# Patient Record
Sex: FEMALE | Race: WHITE | NOT HISPANIC OR LATINO | Employment: UNEMPLOYED | URBAN - NONMETROPOLITAN AREA
[De-identification: names, ages, dates, MRNs, and addresses within clinical notes are randomized per-mention and may not be internally consistent; named-entity substitution may affect disease eponyms.]

---

## 2017-01-04 ENCOUNTER — TRANSCRIBE ORDERS (OUTPATIENT)
Dept: ADMINISTRATIVE | Facility: HOSPITAL | Age: 32
End: 2017-01-04

## 2017-01-04 DIAGNOSIS — Z12.31 ENCOUNTER FOR MAMMOGRAM TO ESTABLISH BASELINE MAMMOGRAM: Primary | ICD-10-CM

## 2017-02-13 ENCOUNTER — HOSPITAL ENCOUNTER (OUTPATIENT)
Dept: MAMMOGRAPHY | Facility: HOSPITAL | Age: 32
Discharge: HOME | End: 2017-02-13
Attending: OBSTETRICS & GYNECOLOGY | Admitting: OBSTETRICS & GYNECOLOGY

## 2017-02-13 DIAGNOSIS — Z12.31 ENCOUNTER FOR MAMMOGRAM TO ESTABLISH BASELINE MAMMOGRAM: ICD-10-CM

## 2017-02-13 PROCEDURE — 77063 BREAST TOMOSYNTHESIS BI: CPT

## 2017-02-13 PROCEDURE — G0202 SCR MAMMO BI INCL CAD: HCPCS

## 2017-02-22 ENCOUNTER — ANESTHESIA (OUTPATIENT)
Dept: PERIOP | Facility: HOSPITAL | Age: 32
End: 2017-02-22

## 2017-02-22 ENCOUNTER — OFFICE VISIT (OUTPATIENT)
Dept: UROLOGY | Facility: CLINIC | Age: 32
End: 2017-02-22

## 2017-02-22 ENCOUNTER — HOSPITAL ENCOUNTER (OUTPATIENT)
Dept: GENERAL RADIOLOGY | Facility: HOSPITAL | Age: 32
Discharge: HOME | End: 2017-02-22

## 2017-02-22 ENCOUNTER — ANESTHESIA EVENT (OUTPATIENT)
Dept: PERIOP | Facility: HOSPITAL | Age: 32
End: 2017-02-22

## 2017-02-22 ENCOUNTER — HOSPITAL ENCOUNTER (OUTPATIENT)
Facility: HOSPITAL | Age: 32
Discharge: HOME | End: 2017-02-22
Attending: UROLOGY | Admitting: UROLOGY

## 2017-02-22 ENCOUNTER — TELEPHONE (OUTPATIENT)
Dept: UROLOGY | Facility: CLINIC | Age: 32
End: 2017-02-22

## 2017-02-22 VITALS — BODY MASS INDEX: 32.78 KG/M2 | HEIGHT: 64 IN | WEIGHT: 192 LBS

## 2017-02-22 VITALS
OXYGEN SATURATION: 96 % | TEMPERATURE: 97.9 F | BODY MASS INDEX: 33.29 KG/M2 | SYSTOLIC BLOOD PRESSURE: 110 MMHG | WEIGHT: 195 LBS | RESPIRATION RATE: 16 BRPM | DIASTOLIC BLOOD PRESSURE: 72 MMHG | HEIGHT: 64 IN | HEART RATE: 101 BPM

## 2017-02-22 DIAGNOSIS — R31.0 GROSS HEMATURIA: ICD-10-CM

## 2017-02-22 DIAGNOSIS — R10.9 FLANK PAIN: ICD-10-CM

## 2017-02-22 DIAGNOSIS — N13.2 HYDRONEPHROSIS WITH RENAL AND URETERAL CALCULUS OBSTRUCTION: ICD-10-CM

## 2017-02-22 DIAGNOSIS — N20.1 URETERAL STONE: Primary | ICD-10-CM

## 2017-02-22 DIAGNOSIS — N20.1 URETERAL STONE: ICD-10-CM

## 2017-02-22 LAB
ANION GAP SERPL CALCULATED.3IONS-SCNC: 7 MMOL/L (ref 4–13)
APTT PPP: 26.9 SECONDS (ref 24.1–34.8)
B-HCG UR QL: NEGATIVE
BACTERIA UR QL AUTO: ABNORMAL /HPF
BILIRUB UR QL STRIP: NEGATIVE
BUN BLD-MCNC: 19 MG/DL (ref 5–21)
BUN/CREAT SERPL: 19.2 (ref 7–25)
CALCIUM SPEC-SCNC: 8.7 MG/DL (ref 8.4–10.4)
CHLORIDE SERPL-SCNC: 102 MMOL/L (ref 98–110)
CLARITY UR: CLEAR
CO2 SERPL-SCNC: 29 MMOL/L (ref 24–31)
COLOR UR: YELLOW
CREAT BLD-MCNC: 0.99 MG/DL (ref 0.5–1.4)
DEPRECATED RDW RBC AUTO: 40.3 FL (ref 40–54)
ERYTHROCYTE [DISTWIDTH] IN BLOOD BY AUTOMATED COUNT: 13.5 % (ref 12–15)
GFR SERPL CREATININE-BSD FRML MDRD: 65 ML/MIN/1.73
GLUCOSE BLD-MCNC: 94 MG/DL (ref 70–100)
GLUCOSE UR STRIP-MCNC: NEGATIVE MG/DL
HCT VFR BLD AUTO: 40.2 % (ref 37–47)
HGB BLD-MCNC: 13.3 G/DL (ref 12–16)
HGB UR QL STRIP.AUTO: ABNORMAL
HYALINE CASTS UR QL AUTO: ABNORMAL /LPF
INR PPP: 0.83 (ref 0.91–1.09)
KETONES UR QL STRIP: NEGATIVE
LEUKOCYTE ESTERASE UR QL STRIP.AUTO: NEGATIVE
MCH RBC QN AUTO: 26.9 PG (ref 28–32)
MCHC RBC AUTO-ENTMCNC: 33.1 G/DL (ref 33–36)
MCV RBC AUTO: 81.2 FL (ref 82–98)
NITRITE UR QL STRIP: NEGATIVE
PH UR STRIP.AUTO: 7.5 [PH] (ref 5–8)
PLATELET # BLD AUTO: 218 10*3/MM3 (ref 130–400)
PMV BLD AUTO: 9.4 FL (ref 6–12)
POTASSIUM BLD-SCNC: 4.3 MMOL/L (ref 3.5–5.3)
PROT UR QL STRIP: NEGATIVE
PROTHROMBIN TIME: 11.6 SECONDS (ref 11.9–14.6)
RBC # BLD AUTO: 4.95 10*6/MM3 (ref 4.2–5.4)
RBC # UR: ABNORMAL /HPF
REF LAB TEST METHOD: ABNORMAL
SODIUM BLD-SCNC: 138 MMOL/L (ref 135–145)
SP GR UR STRIP: 1.01 (ref 1–1.03)
SQUAMOUS #/AREA URNS HPF: ABNORMAL /HPF
UROBILINOGEN UR QL STRIP: ABNORMAL
WBC NRBC COR # BLD: 8.6 10*3/MM3 (ref 4.8–10.8)
WBC UR QL AUTO: ABNORMAL /HPF

## 2017-02-22 PROCEDURE — 25010000002 MIDAZOLAM PER 1 MG: Performed by: ANESTHESIOLOGY

## 2017-02-22 PROCEDURE — 25010000002 FENTANYL CITRATE (PF) 100 MCG/2ML SOLUTION: Performed by: NURSE ANESTHETIST, CERTIFIED REGISTERED

## 2017-02-22 PROCEDURE — 80048 BASIC METABOLIC PNL TOTAL CA: CPT | Performed by: UROLOGY

## 2017-02-22 PROCEDURE — 99204 OFFICE O/P NEW MOD 45 MIN: CPT | Performed by: UROLOGY

## 2017-02-22 PROCEDURE — 74000 HC ABDOMEN KUB: CPT

## 2017-02-22 PROCEDURE — 85730 THROMBOPLASTIN TIME PARTIAL: CPT | Performed by: UROLOGY

## 2017-02-22 PROCEDURE — 81001 URINALYSIS AUTO W/SCOPE: CPT | Performed by: UROLOGY

## 2017-02-22 PROCEDURE — 25010000002 SUCCINYLCHOLINE PER 20 MG: Performed by: NURSE ANESTHETIST, CERTIFIED REGISTERED

## 2017-02-22 PROCEDURE — 81025 URINE PREGNANCY TEST: CPT | Performed by: UROLOGY

## 2017-02-22 PROCEDURE — 25010000002 PROPOFOL 10 MG/ML EMULSION: Performed by: NURSE ANESTHETIST, CERTIFIED REGISTERED

## 2017-02-22 PROCEDURE — 25010000003 CEFAZOLIN PER 500 MG: Performed by: UROLOGY

## 2017-02-22 PROCEDURE — 85027 COMPLETE CBC AUTOMATED: CPT | Performed by: UROLOGY

## 2017-02-22 PROCEDURE — 85610 PROTHROMBIN TIME: CPT | Performed by: UROLOGY

## 2017-02-22 PROCEDURE — 50590 FRAGMENTING OF KIDNEY STONE: CPT | Performed by: UROLOGY

## 2017-02-22 RX ORDER — ROCURONIUM BROMIDE 10 MG/ML
INJECTION, SOLUTION INTRAVENOUS AS NEEDED
Status: DISCONTINUED | OUTPATIENT
Start: 2017-02-22 | End: 2017-02-22 | Stop reason: SURG

## 2017-02-22 RX ORDER — HYDRALAZINE HYDROCHLORIDE 20 MG/ML
5 INJECTION INTRAMUSCULAR; INTRAVENOUS
Status: DISCONTINUED | OUTPATIENT
Start: 2017-02-22 | End: 2017-02-22 | Stop reason: HOSPADM

## 2017-02-22 RX ORDER — KETOROLAC TROMETHAMINE 10 MG/1
10 TABLET, FILM COATED ORAL EVERY 6 HOURS PRN
COMMUNITY

## 2017-02-22 RX ORDER — LABETALOL HYDROCHLORIDE 5 MG/ML
5 INJECTION, SOLUTION INTRAVENOUS
Status: DISCONTINUED | OUTPATIENT
Start: 2017-02-22 | End: 2017-02-22 | Stop reason: HOSPADM

## 2017-02-22 RX ORDER — FENTANYL CITRATE 50 UG/ML
INJECTION, SOLUTION INTRAMUSCULAR; INTRAVENOUS AS NEEDED
Status: DISCONTINUED | OUTPATIENT
Start: 2017-02-22 | End: 2017-02-22 | Stop reason: SURG

## 2017-02-22 RX ORDER — SODIUM CHLORIDE, SODIUM LACTATE, POTASSIUM CHLORIDE, CALCIUM CHLORIDE 600; 310; 30; 20 MG/100ML; MG/100ML; MG/100ML; MG/100ML
9 INJECTION, SOLUTION INTRAVENOUS CONTINUOUS
Status: DISCONTINUED | OUTPATIENT
Start: 2017-02-22 | End: 2017-02-22 | Stop reason: HOSPADM

## 2017-02-22 RX ORDER — LIDOCAINE HYDROCHLORIDE 20 MG/ML
INJECTION, SOLUTION INFILTRATION; PERINEURAL AS NEEDED
Status: DISCONTINUED | OUTPATIENT
Start: 2017-02-22 | End: 2017-02-22 | Stop reason: SURG

## 2017-02-22 RX ORDER — SUCCINYLCHOLINE CHLORIDE 20 MG/ML
INJECTION INTRAMUSCULAR; INTRAVENOUS AS NEEDED
Status: DISCONTINUED | OUTPATIENT
Start: 2017-02-22 | End: 2017-02-22 | Stop reason: SURG

## 2017-02-22 RX ORDER — SPIRONOLACTONE 50 MG/1
TABLET, FILM COATED ORAL
COMMUNITY
Start: 2016-11-21

## 2017-02-22 RX ORDER — ATOMOXETINE HYDROCHLORIDE 80 MG/1
60 CAPSULE ORAL DAILY
COMMUNITY
Start: 2016-11-25

## 2017-02-22 RX ORDER — NALOXONE HCL 0.4 MG/ML
0.4 VIAL (ML) INJECTION AS NEEDED
Status: DISCONTINUED | OUTPATIENT
Start: 2017-02-22 | End: 2017-02-22 | Stop reason: HOSPADM

## 2017-02-22 RX ORDER — MIDAZOLAM HYDROCHLORIDE 1 MG/ML
1 INJECTION INTRAMUSCULAR; INTRAVENOUS
Status: DISCONTINUED | OUTPATIENT
Start: 2017-02-22 | End: 2017-02-22 | Stop reason: HOSPADM

## 2017-02-22 RX ORDER — ESCITALOPRAM OXALATE 20 MG/1
30 TABLET ORAL DAILY
COMMUNITY
Start: 2016-11-25

## 2017-02-22 RX ORDER — ONDANSETRON 4 MG/1
4 TABLET, FILM COATED ORAL ONCE AS NEEDED
Status: DISCONTINUED | OUTPATIENT
Start: 2017-02-22 | End: 2017-02-22 | Stop reason: HOSPADM

## 2017-02-22 RX ORDER — IPRATROPIUM BROMIDE AND ALBUTEROL SULFATE 2.5; .5 MG/3ML; MG/3ML
3 SOLUTION RESPIRATORY (INHALATION) ONCE AS NEEDED
Status: DISCONTINUED | OUTPATIENT
Start: 2017-02-22 | End: 2017-02-22 | Stop reason: HOSPADM

## 2017-02-22 RX ORDER — PROPOFOL 10 MG/ML
VIAL (ML) INTRAVENOUS AS NEEDED
Status: DISCONTINUED | OUTPATIENT
Start: 2017-02-22 | End: 2017-02-22 | Stop reason: SURG

## 2017-02-22 RX ORDER — ONDANSETRON 2 MG/ML
4 INJECTION INTRAMUSCULAR; INTRAVENOUS ONCE AS NEEDED
Status: DISCONTINUED | OUTPATIENT
Start: 2017-02-22 | End: 2017-02-22 | Stop reason: HOSPADM

## 2017-02-22 RX ORDER — ACETAMINOPHEN 10 MG/ML
1000 INJECTION, SOLUTION INTRAVENOUS ONCE
Status: COMPLETED | OUTPATIENT
Start: 2017-02-22 | End: 2017-02-22

## 2017-02-22 RX ORDER — DEXTROSE MONOHYDRATE 25 G/50ML
12.5 INJECTION, SOLUTION INTRAVENOUS AS NEEDED
Status: DISCONTINUED | OUTPATIENT
Start: 2017-02-22 | End: 2017-02-22 | Stop reason: HOSPADM

## 2017-02-22 RX ORDER — FERROUS SULFATE TAB EC 324 MG (65 MG FE EQUIVALENT) 324 (65 FE) MG
324 TABLET DELAYED RESPONSE ORAL
COMMUNITY

## 2017-02-22 RX ORDER — MIDAZOLAM HYDROCHLORIDE 1 MG/ML
2 INJECTION INTRAMUSCULAR; INTRAVENOUS
Status: DISCONTINUED | OUTPATIENT
Start: 2017-02-22 | End: 2017-02-22 | Stop reason: HOSPADM

## 2017-02-22 RX ORDER — MEPERIDINE HYDROCHLORIDE 25 MG/ML
12.5 INJECTION INTRAMUSCULAR; INTRAVENOUS; SUBCUTANEOUS
Status: DISCONTINUED | OUTPATIENT
Start: 2017-02-22 | End: 2017-02-22 | Stop reason: HOSPADM

## 2017-02-22 RX ORDER — SODIUM CHLORIDE 0.9 % (FLUSH) 0.9 %
1-10 SYRINGE (ML) INJECTION AS NEEDED
Status: DISCONTINUED | OUTPATIENT
Start: 2017-02-22 | End: 2017-02-22 | Stop reason: HOSPADM

## 2017-02-22 RX ORDER — PROMETHAZINE HYDROCHLORIDE 25 MG/ML
12.5 INJECTION, SOLUTION INTRAMUSCULAR; INTRAVENOUS ONCE AS NEEDED
Status: DISCONTINUED | OUTPATIENT
Start: 2017-02-22 | End: 2017-02-22 | Stop reason: HOSPADM

## 2017-02-22 RX ORDER — LORAZEPAM 1 MG/1
1 TABLET ORAL EVERY 8 HOURS PRN
COMMUNITY

## 2017-02-22 RX ORDER — LAMOTRIGINE 100 MG/1
TABLET ORAL
COMMUNITY
Start: 2016-12-13

## 2017-02-22 RX ORDER — TAMSULOSIN HYDROCHLORIDE 0.4 MG/1
1 CAPSULE ORAL DAILY
Qty: 30 CAPSULE | Refills: 3 | Status: SHIPPED | OUTPATIENT
Start: 2017-02-22

## 2017-02-22 RX ORDER — DIPHENHYDRAMINE HYDROCHLORIDE 50 MG/ML
12.5 INJECTION INTRAMUSCULAR; INTRAVENOUS
Status: DISCONTINUED | OUTPATIENT
Start: 2017-02-22 | End: 2017-02-22 | Stop reason: HOSPADM

## 2017-02-22 RX ADMIN — MIDAZOLAM HYDROCHLORIDE 2 MG: 1 INJECTION, SOLUTION INTRAMUSCULAR; INTRAVENOUS at 13:44

## 2017-02-22 RX ADMIN — ACETAMINOPHEN 1000 MG: 10 INJECTION, SOLUTION INTRAVENOUS at 13:44

## 2017-02-22 RX ADMIN — CEFAZOLIN SODIUM 2 G: 2 SOLUTION INTRAVENOUS at 14:13

## 2017-02-22 RX ADMIN — SODIUM CHLORIDE, POTASSIUM CHLORIDE, SODIUM LACTATE AND CALCIUM CHLORIDE 9 ML/HR: 600; 310; 30; 20 INJECTION, SOLUTION INTRAVENOUS at 13:44

## 2017-02-22 RX ADMIN — MIDAZOLAM HYDROCHLORIDE 2 MG: 1 INJECTION, SOLUTION INTRAMUSCULAR; INTRAVENOUS at 13:48

## 2017-02-22 RX ADMIN — FENTANYL CITRATE 50 MCG: 50 INJECTION INTRAMUSCULAR; INTRAVENOUS at 14:20

## 2017-02-22 RX ADMIN — SUCCINYLCHOLINE CHLORIDE 120 MG: 20 INJECTION, SOLUTION INTRAMUSCULAR; INTRAVENOUS at 14:16

## 2017-02-22 RX ADMIN — ROCURONIUM BROMIDE 5 MG: 10 INJECTION INTRAVENOUS at 14:16

## 2017-02-22 RX ADMIN — SODIUM CHLORIDE, POTASSIUM CHLORIDE, SODIUM LACTATE AND CALCIUM CHLORIDE: 600; 310; 30; 20 INJECTION, SOLUTION INTRAVENOUS at 15:20

## 2017-02-22 RX ADMIN — LIDOCAINE HYDROCHLORIDE 0.5 ML: 10 INJECTION, SOLUTION EPIDURAL; INFILTRATION; INTRACAUDAL; PERINEURAL at 13:44

## 2017-02-22 RX ADMIN — PROPOFOL 200 MG: 10 INJECTION, EMULSION INTRAVENOUS at 14:16

## 2017-02-22 RX ADMIN — FENTANYL CITRATE 50 MCG: 50 INJECTION INTRAMUSCULAR; INTRAVENOUS at 14:24

## 2017-02-22 RX ADMIN — FENTANYL CITRATE 100 MCG: 50 INJECTION INTRAMUSCULAR; INTRAVENOUS at 14:16

## 2017-02-22 RX ADMIN — FENTANYL CITRATE 50 MCG: 50 INJECTION INTRAMUSCULAR; INTRAVENOUS at 14:30

## 2017-02-22 RX ADMIN — LIDOCAINE HYDROCHLORIDE 40 MG: 20 INJECTION, SOLUTION INFILTRATION; PERINEURAL at 14:16

## 2017-02-22 NOTE — ANESTHESIA POSTPROCEDURE EVALUATION
Patient: Iliana Young    Procedure Summary     Date Anesthesia Start Anesthesia Stop Room / Location    02/22/17 1413 1526 [facility] 06 / [facility]       Procedure Diagnosis Surgeon Provider    EXTRACORPOREAL SHOCKWAVE LITHOTRIPSY (Left Flank) Ureteral stone  (Ureteral stone [N20.1]) MD Darion Sanchez CRNA          Anesthesia Type: general  Last vitals  /62 (02/22/17 1605)    Temp      Pulse 82 (02/22/17 1605)   Resp 16 (02/22/17 1605)    SpO2 96 % (02/22/17 1605)      Post Anesthesia Care and Evaluation    Patient location during evaluation: PACU  Patient participation: complete - patient cannot participate  Level of consciousness: awake  Pain score: 0  Pain management: adequate  Airway patency: patent  Anesthetic complications: No anesthetic complications  PONV Status: none  Cardiovascular status: acceptable  Respiratory status: acceptable  Hydration status: acceptable

## 2017-02-22 NOTE — ANESTHESIA PREPROCEDURE EVALUATION
Anesthesia Evaluation     Patient summary reviewed   no history of anesthetic complications:  NPO Status: > 8 hours   Airway   Mallampati: II  TM distance: >3 FB  Neck ROM: full  Dental      Pulmonary    (-) asthma, sleep apnea, not a smoker  Cardiovascular - negative cardio ROS        Neuro/Psych  (-) seizures, CVA  GI/Hepatic/Renal/Endo    (+) obesity,    (-) liver disease, renal disease, diabetes    Musculoskeletal     Abdominal    Substance History      OB/GYN          Other                                    Anesthesia Plan    ASA 2     general     intravenous induction   Anesthetic plan and risks discussed with patient.

## 2017-02-22 NOTE — PROGRESS NOTES
Subjective    Ms. Young is 31 y.o. female    CHIEF COMPLAINT: Left flank pain    The patient has been experiencing some gross hematuria for about a week off and on with no pain, daily so ago she started having some left flank pain was then completely resolved and then yesterday evening she had severe pain that was very very painful and came the emergency room at [facility] at that point showed a CT scan which is noted below    CT independent review  The CT scan of the abdomen/pelvis done without contrast is available for me to review.  Treatment recommendations require an independent review.  First I scanned the liver, spleen, and bowel pattern.  The retroperitoneum including the major vessels and lymphatic packages are briefly reviewed.  This film as been reviewed by the radiologist to determine any non urologic abnormalities that are present.  The kidneys are closely inspected for size, symmetry, contour, parenchymal thickness, perinephric reaction, presence of calcifications, and intrarenal dilation of the collecting system.  The ureters are inspected for their course, caliber, and any calcifications.  The bladder is inspected for its thickness, size, and presence of any calcifications.  This scan shows:    The right kidney appears abnormal on this non contrasted CT scan.   Normal parenchymal thickness and Single renal stone measuring 2 mm    The left kidney appears abnormal on this non contrasted CT scan.   Normal parenchymal thickness and moderate hydronephrosisto a 5-6 mm Hydro stone rather in the right upper ureter.  Also says she also has a couple small specks the left kidney    The bladder appears normal on this non-contrasted CT scan.  The bladder appears normal in thickness.  There no masses or stones seen on this exam.    She denies any fever she has had a stone previously but this was U pregnant by times a day with the stone seems to have passed she is not had any since then no urgency frequency  burning stinging etc.    History of Present Illness      The following portions of the patient's history were reviewed and updated as appropriate: allergies, current medications, past family history, past medical history, past social history, past surgical history and problem list.    Review of Systems   Constitutional: Negative for appetite change, chills, fatigue, fever and unexpected weight change.   HENT: Negative for congestion, dental problem, ear pain, hearing loss, nosebleeds, sinus pressure and trouble swallowing.    Eyes: Negative for pain, discharge, redness and itching.   Respiratory: Negative for apnea, cough, choking and shortness of breath.    Cardiovascular: Negative for chest pain and palpitations.   Gastrointestinal: Negative for abdominal distention, abdominal pain, blood in stool, constipation, diarrhea, nausea and vomiting.   Endocrine: Negative for cold intolerance and heat intolerance.   Genitourinary: Positive for flank pain and hematuria. Negative for difficulty urinating, dysuria, frequency, pelvic pain and urgency.   Musculoskeletal: Negative for arthralgias, back pain and gait problem.   Skin: Negative for pallor, rash and wound.   Allergic/Immunologic: Negative for immunocompromised state.   Neurological: Negative for dizziness, tremors, seizures, weakness, numbness and headaches.   Hematological: Negative for adenopathy. Does not bruise/bleed easily.   Psychiatric/Behavioral: Negative for agitation, behavioral problems, hallucinations, self-injury and suicidal ideas.         Current Outpatient Prescriptions:   •  ketorolac (TORADOL) 10 MG tablet, Take 10 mg by mouth Every 6 (Six) Hours As Needed for moderate pain (4-6)., Disp: , Rfl:   •  LORazepam (ATIVAN) 1 MG tablet, Take 1 mg by mouth Every 8 (Eight) Hours As Needed for anxiety., Disp: , Rfl:   •  escitalopram (LEXAPRO) 20 MG tablet, , Disp: , Rfl:   •  lamoTRIgine (LaMICtal) 100 MG tablet, , Disp: , Rfl:   •  spironolactone  "(ALDACTONE) 50 MG tablet, , Disp: , Rfl:   •  STRATTERA 80 MG capsule, , Disp: , Rfl:     History reviewed. No pertinent past medical history.    Past Surgical History   Procedure Laterality Date   • Tonsillectomy     •  section         Social History     Social History   • Marital status:      Spouse name: N/A   • Number of children: N/A   • Years of education: N/A     Social History Main Topics   • Smoking status: Never Smoker   • Smokeless tobacco: None   • Alcohol use No   • Drug use: None   • Sexual activity: Not Asked     Other Topics Concern   • None     Social History Narrative       Family History   Problem Relation Age of Onset   • Breast cancer Sister    • Breast cancer Sister        Objective    Visit Vitals   • Ht 64\" (162.6 cm)   • Wt 192 lb (87.1 kg)   • LMP 2017 (Approximate)   • BMI 32.96 kg/m2       Physical Exam   Constitutional: She is oriented to person, place, and time. She appears well-developed and well-nourished. No distress.   HENT:   Head: Normocephalic and atraumatic.   Right Ear: Ear canal normal.   Left Ear: Ear canal normal.   Nose: Nose normal. No nasal deformity. No epistaxis.   Mouth/Throat: Mucous membranes are not pale, not dry and not cyanotic. Normal dentition. No oropharyngeal exudate.   Eyes: Conjunctivae are normal. No scleral icterus.   Neck: Trachea normal and normal range of motion. No tracheal tenderness present. No tracheal deviation present. No thyroid mass and no thyromegaly present.   Cardiovascular: Normal rate and regular rhythm.    Pulmonary/Chest: Effort normal. No accessory muscle usage. No respiratory distress. Chest wall is not dull to percussion (No flatness or hyperresonance). She exhibits no mass and no tenderness.   On palpation, no tactile fremitus. All movements are symmetric. No intercostal retraction noted.    Abdominal: Soft. Normal appearance. She exhibits no distension and no mass. There is no hepatosplenomegaly. There is " tenderness. No hernia.   Mild left CVA tenderness Rectal examination or stool specimen is not indicated.    Musculoskeletal:   Normal gait and station. The spine, ribs, and pelvis are examined. No obvious misalignment or asymmetry. ROM is reasonable for age. No instability. No obvious atrophy, flaccidity or spasticity.    Lymphadenopathy:     She has no cervical adenopathy.        Right: No inguinal adenopathy present.        Left: No inguinal adenopathy present.   Neurological: She is alert and oriented to person, place, and time.   Skin: Skin is warm, dry and intact. No lesion and no rash noted. She is not diaphoretic. No cyanosis. No pallor. Nails show no clubbing.   On palpation, there were no induration, subcutaneous nodules, or tightening   Psychiatric: Her speech is normal and behavior is normal. Judgment and thought content normal. Her mood appears not anxious. Her affect is not labile. She does not exhibit a depressed mood.   Vitals reviewed.        No results found for any previous visit.    No results found for this or any previous visit.    Assessment and Plan    Iliana was seen today for flank pain.    Diagnoses and all orders for this visit:    Ureteral stone    Hydronephrosis with renal and ureteral calculus obstruction    Flank pain    Gross hematuria    Plan--I discussed the options with the patient and her family she does have a stone in the left upper ureter we discussed observation with the expected management and Flomax etc. but she wants this taken care of as soon as possible.    All we discussed options and I recommended a left ESWL how we do the procedure risk complications etc. were discussed and all questions were answered all we will send her to [facility] outpatient department now in preparation for the procedure this afternoon she is artery been basically nothing by mouth.    Also she requested no narcotics he has had a shot of Toradol in the ER earlier today    EMR Dragon/Transcription  disclaimer:  Much of this encounter note is an electronic transcription/translation of spoken language to printed text. The electronic translation of spoken language may permit erroneous, or at times, nonsensical words or phrases to be inadvertently transcribed; although I have reviewed the note for such errors, some may still exist.

## 2017-02-22 NOTE — DISCHARGE INSTRUCTIONS

## 2017-02-22 NOTE — OP NOTE
OP NOTE  Iliana Young    Date of Procedure: 2/22/2017    Pre-op Diagnosis:   Ureteral stone [N20.1]    Post-op Diagnosis:     Post-Op Diagnosis Codes:     * Ureteral stone [N20.1]    Procedure/CPT® Codes:      Procedure(s):  EXTRACORPOREAL SHOCKWAVE LITHOTRIPSY    Surgeon(s):  Félix Dennison MD    Anesthesia: General    Staff:   Circulator: Racquel Ferreira RN    Indications: Patient has  A stone  measuring approximately 7 mm in the left Proximal ureter.  After discussion of options, patient has given informed consent to undergo left ESWL.  All risks, benefits, and alternatives were discussed.    Operative Report: The patient is identified. The KUB is reviewed. After answering any questions, patient was brought to the operating room and placed on the patient table of the [facility].  General endotracheal anesthesia was administered.  Preoperative KUB was reviewed.   An [facility] c-arm fluoroscope was used to identify the stone.    The shock-head is brought into postion.  This stone is brought into the F2 plane for focus.  Treatment was initiated.  We gradually increased the energy level from 1 to 9.  This stone was treated at a rate of 60.  We paused for 2 minutes after 300 shocks to reduce risk of renal damage.  The stone was periodically checked to make sure that we were on it and getting good breakup.  We checked at 700, 1000, 1500, 2000, and 2500 shocks.  The stone did have good breakup.  I felt it was unnecessary to place a ureteral stent.  At the conclusion of 3000 shocks, the patient was awakened from anesthesia and transferred to the recovery room in satisfactory condition.      Estimated Blood Loss:  <30mL    Specimens:                * No specimens in log *      Drains:           Complications: none  Plan: F/U with dr. Sultana in two weeks    Félix Dennison MD     Date: 2/22/2017  Time: 3:38 PM

## 2017-02-22 NOTE — H&P (VIEW-ONLY)
Subjective    Ms. Young is 31 y.o. female    CHIEF COMPLAINT: Left flank pain    The patient has been experiencing some gross hematuria for about a week off and on with no pain, daily so ago she started having some left flank pain was then completely resolved and then yesterday evening she had severe pain that was very very painful and came the emergency room at [facility] at that point showed a CT scan which is noted below    CT independent review  The CT scan of the abdomen/pelvis done without contrast is available for me to review.  Treatment recommendations require an independent review.  First I scanned the liver, spleen, and bowel pattern.  The retroperitoneum including the major vessels and lymphatic packages are briefly reviewed.  This film as been reviewed by the radiologist to determine any non urologic abnormalities that are present.  The kidneys are closely inspected for size, symmetry, contour, parenchymal thickness, perinephric reaction, presence of calcifications, and intrarenal dilation of the collecting system.  The ureters are inspected for their course, caliber, and any calcifications.  The bladder is inspected for its thickness, size, and presence of any calcifications.  This scan shows:    The right kidney appears abnormal on this non contrasted CT scan.   Normal parenchymal thickness and Single renal stone measuring 2 mm    The left kidney appears abnormal on this non contrasted CT scan.   Normal parenchymal thickness and moderate hydronephrosisto a 5-6 mm Hydro stone rather in the right upper ureter.  Also says she also has a couple small specks the left kidney    The bladder appears normal on this non-contrasted CT scan.  The bladder appears normal in thickness.  There no masses or stones seen on this exam.    She denies any fever she has had a stone previously but this was U pregnant by times a day with the stone seems to have passed she is not had any since then no urgency frequency  burning stinging etc.    History of Present Illness      The following portions of the patient's history were reviewed and updated as appropriate: allergies, current medications, past family history, past medical history, past social history, past surgical history and problem list.    Review of Systems   Constitutional: Negative for appetite change, chills, fatigue, fever and unexpected weight change.   HENT: Negative for congestion, dental problem, ear pain, hearing loss, nosebleeds, sinus pressure and trouble swallowing.    Eyes: Negative for pain, discharge, redness and itching.   Respiratory: Negative for apnea, cough, choking and shortness of breath.    Cardiovascular: Negative for chest pain and palpitations.   Gastrointestinal: Negative for abdominal distention, abdominal pain, blood in stool, constipation, diarrhea, nausea and vomiting.   Endocrine: Negative for cold intolerance and heat intolerance.   Genitourinary: Positive for flank pain and hematuria. Negative for difficulty urinating, dysuria, frequency, pelvic pain and urgency.   Musculoskeletal: Negative for arthralgias, back pain and gait problem.   Skin: Negative for pallor, rash and wound.   Allergic/Immunologic: Negative for immunocompromised state.   Neurological: Negative for dizziness, tremors, seizures, weakness, numbness and headaches.   Hematological: Negative for adenopathy. Does not bruise/bleed easily.   Psychiatric/Behavioral: Negative for agitation, behavioral problems, hallucinations, self-injury and suicidal ideas.         Current Outpatient Prescriptions:   •  ketorolac (TORADOL) 10 MG tablet, Take 10 mg by mouth Every 6 (Six) Hours As Needed for moderate pain (4-6)., Disp: , Rfl:   •  LORazepam (ATIVAN) 1 MG tablet, Take 1 mg by mouth Every 8 (Eight) Hours As Needed for anxiety., Disp: , Rfl:   •  escitalopram (LEXAPRO) 20 MG tablet, , Disp: , Rfl:   •  lamoTRIgine (LaMICtal) 100 MG tablet, , Disp: , Rfl:   •  spironolactone  "(ALDACTONE) 50 MG tablet, , Disp: , Rfl:   •  STRATTERA 80 MG capsule, , Disp: , Rfl:     History reviewed. No pertinent past medical history.    Past Surgical History   Procedure Laterality Date   • Tonsillectomy     •  section         Social History     Social History   • Marital status:      Spouse name: N/A   • Number of children: N/A   • Years of education: N/A     Social History Main Topics   • Smoking status: Never Smoker   • Smokeless tobacco: None   • Alcohol use No   • Drug use: None   • Sexual activity: Not Asked     Other Topics Concern   • None     Social History Narrative       Family History   Problem Relation Age of Onset   • Breast cancer Sister    • Breast cancer Sister        Objective    Visit Vitals   • Ht 64\" (162.6 cm)   • Wt 192 lb (87.1 kg)   • LMP 2017 (Approximate)   • BMI 32.96 kg/m2       Physical Exam   Constitutional: She is oriented to person, place, and time. She appears well-developed and well-nourished. No distress.   HENT:   Head: Normocephalic and atraumatic.   Right Ear: Ear canal normal.   Left Ear: Ear canal normal.   Nose: Nose normal. No nasal deformity. No epistaxis.   Mouth/Throat: Mucous membranes are not pale, not dry and not cyanotic. Normal dentition. No oropharyngeal exudate.   Eyes: Conjunctivae are normal. No scleral icterus.   Neck: Trachea normal and normal range of motion. No tracheal tenderness present. No tracheal deviation present. No thyroid mass and no thyromegaly present.   Cardiovascular: Normal rate and regular rhythm.    Pulmonary/Chest: Effort normal. No accessory muscle usage. No respiratory distress. Chest wall is not dull to percussion (No flatness or hyperresonance). She exhibits no mass and no tenderness.   On palpation, no tactile fremitus. All movements are symmetric. No intercostal retraction noted.    Abdominal: Soft. Normal appearance. She exhibits no distension and no mass. There is no hepatosplenomegaly. There is " tenderness. No hernia.   Mild left CVA tenderness Rectal examination or stool specimen is not indicated.    Musculoskeletal:   Normal gait and station. The spine, ribs, and pelvis are examined. No obvious misalignment or asymmetry. ROM is reasonable for age. No instability. No obvious atrophy, flaccidity or spasticity.    Lymphadenopathy:     She has no cervical adenopathy.        Right: No inguinal adenopathy present.        Left: No inguinal adenopathy present.   Neurological: She is alert and oriented to person, place, and time.   Skin: Skin is warm, dry and intact. No lesion and no rash noted. She is not diaphoretic. No cyanosis. No pallor. Nails show no clubbing.   On palpation, there were no induration, subcutaneous nodules, or tightening   Psychiatric: Her speech is normal and behavior is normal. Judgment and thought content normal. Her mood appears not anxious. Her affect is not labile. She does not exhibit a depressed mood.   Vitals reviewed.        No results found for any previous visit.    No results found for this or any previous visit.    Assessment and Plan    Iliana was seen today for flank pain.    Diagnoses and all orders for this visit:    Ureteral stone    Hydronephrosis with renal and ureteral calculus obstruction    Flank pain    Gross hematuria    Plan--I discussed the options with the patient and her family she does have a stone in the left upper ureter we discussed observation with the expected management and Flomax etc. but she wants this taken care of as soon as possible.    All we discussed options and I recommended a left ESWL how we do the procedure risk complications etc. were discussed and all questions were answered all we will send her to [facility] outpatient department now in preparation for the procedure this afternoon she is artery been basically nothing by mouth.    Also she requested no narcotics he has had a shot of Toradol in the ER earlier today    EMR Dragon/Transcription  disclaimer:  Much of this encounter note is an electronic transcription/translation of spoken language to printed text. The electronic translation of spoken language may permit erroneous, or at times, nonsensical words or phrases to be inadvertently transcribed; although I have reviewed the note for such errors, some may still exist.

## 2017-02-22 NOTE — ANESTHESIA PROCEDURE NOTES
Airway  Urgency: elective    Airway not difficult    General Information and Staff    Patient location during procedure: OR  CRNA: JESSICA GALLEGO    Indications and Patient Condition    Preoxygenated: yes  MILS not maintained throughout  Mask difficulty assessment: 1 - vent by mask    Final Airway Details  Final airway type: endotracheal airway      Successful airway: ETT  Cuffed: yes   Successful intubation technique: direct laryngoscopy  Facilitating devices/methods: intubating stylet  Endotracheal tube insertion site: oral  Blade: Stevenson  Blade size: #2  ETT size: 7.5 mm  Cormack-Lehane Classification: grade I - full view of glottis  Placement verified by: chest auscultation and capnometry   Measured from: lips  ETT to lips (cm): 20  Number of attempts at approach: 1

## 2017-02-22 NOTE — PLAN OF CARE
Problem: Patient Care Overview (Adult)  Goal: Plan of Care Review  Outcome: Outcome(s) achieved Date Met:  02/22/17 02/22/17 1721   Coping/Psychosocial Response Interventions   Plan Of Care Reviewed With patient;spouse   Patient Care Overview   Progress improving   Outcome Evaluation   Outcome Summary/Follow up Plan dischg criteria met         Problem: Perioperative Period (Adult)  Goal: Signs and Symptoms of Listed Potential Problems Will be Absent or Manageable (Perioperative Period)  Outcome: Outcome(s) achieved Date Met:  02/22/17

## 2017-02-27 ENCOUNTER — TELEPHONE (OUTPATIENT)
Dept: UROLOGY | Facility: CLINIC | Age: 32
End: 2017-02-27

## 2017-02-27 NOTE — TELEPHONE ENCOUNTER
I called the patient she was not bear and left message and indicated to her that most likely she may have some fragments to the UVJ causing this as long she is not running a fever and she can tolerate the discomfort there is probably nothing except time at this point call back course if she wants

## 2017-02-27 NOTE — TELEPHONE ENCOUNTER
----- Message from Veronika Lopez MA sent at 2/27/2017 10:18 AM CST -----  Patient called stating they just had surgery a few weeks ago and they are having a lot of frequency. She states she can be just sitting there and start peeing  Please advise-Dr. Sultana patient

## 2017-02-28 ENCOUNTER — TELEPHONE (OUTPATIENT)
Dept: UROLOGY | Facility: CLINIC | Age: 32
End: 2017-02-28

## 2017-02-28 DIAGNOSIS — N20.0 RENAL STONES: Primary | ICD-10-CM

## 2017-03-01 ENCOUNTER — HOSPITAL ENCOUNTER (EMERGENCY)
Facility: HOSPITAL | Age: 32
Discharge: HOME | End: 2017-03-01
Admitting: EMERGENCY MEDICINE

## 2017-03-01 ENCOUNTER — APPOINTMENT (OUTPATIENT)
Dept: CT IMAGING | Facility: HOSPITAL | Age: 32
End: 2017-03-01

## 2017-03-01 ENCOUNTER — TELEPHONE (OUTPATIENT)
Dept: UROLOGY | Facility: CLINIC | Age: 32
End: 2017-03-01

## 2017-03-01 VITALS
HEART RATE: 86 BPM | DIASTOLIC BLOOD PRESSURE: 81 MMHG | WEIGHT: 194 LBS | RESPIRATION RATE: 18 BRPM | TEMPERATURE: 98.1 F | SYSTOLIC BLOOD PRESSURE: 124 MMHG | HEIGHT: 64 IN | BODY MASS INDEX: 33.12 KG/M2 | OXYGEN SATURATION: 100 %

## 2017-03-01 DIAGNOSIS — N20.0 RENAL STONES: Primary | ICD-10-CM

## 2017-03-01 DIAGNOSIS — R10.9 FLANK PAIN: Primary | ICD-10-CM

## 2017-03-01 DIAGNOSIS — N30.01 ACUTE CYSTITIS WITH HEMATURIA: ICD-10-CM

## 2017-03-01 LAB
ALBUMIN SERPL-MCNC: 4.6 G/DL (ref 3.5–5)
ALBUMIN/GLOB SERPL: 1.3 G/DL (ref 1.1–2.5)
ALP SERPL-CCNC: 59 U/L (ref 24–120)
ALT SERPL W P-5'-P-CCNC: 23 U/L (ref 0–54)
AMYLASE SERPL-CCNC: 52 U/L (ref 30–110)
ANION GAP SERPL CALCULATED.3IONS-SCNC: 10 MMOL/L (ref 4–13)
APTT PPP: 27.9 SECONDS (ref 24.1–34.8)
AST SERPL-CCNC: 34 U/L (ref 7–45)
B-HCG UR QL: NEGATIVE
BACTERIA UR QL AUTO: ABNORMAL /HPF
BASOPHILS # BLD AUTO: 0.07 10*3/MM3 (ref 0–0.2)
BASOPHILS NFR BLD AUTO: 0.9 % (ref 0–2)
BILIRUB SERPL-MCNC: 0.4 MG/DL (ref 0.1–1)
BILIRUB UR QL STRIP: NEGATIVE
BUN BLD-MCNC: 24 MG/DL (ref 5–21)
BUN/CREAT SERPL: 34.3 (ref 7–25)
CALCIUM SPEC-SCNC: 9.3 MG/DL (ref 8.4–10.4)
CHLORIDE SERPL-SCNC: 100 MMOL/L (ref 98–110)
CLARITY UR: ABNORMAL
CO2 SERPL-SCNC: 32 MMOL/L (ref 24–31)
COLOR UR: YELLOW
CREAT BLD-MCNC: 0.7 MG/DL (ref 0.5–1.4)
CRP SERPL-MCNC: 0.52 MG/DL (ref 0–0.99)
D-LACTATE SERPL-SCNC: 0.8 MMOL/L (ref 0.5–2)
DEPRECATED RDW RBC AUTO: 39.9 FL (ref 40–54)
EOSINOPHIL # BLD AUTO: 0.13 10*3/MM3 (ref 0–0.7)
EOSINOPHIL NFR BLD AUTO: 1.7 % (ref 0–4)
ERYTHROCYTE [DISTWIDTH] IN BLOOD BY AUTOMATED COUNT: 13.3 % (ref 12–15)
GFR SERPL CREATININE-BSD FRML MDRD: 96 ML/MIN/1.73
GLOBULIN UR ELPH-MCNC: 3.5 GM/DL
GLUCOSE BLD-MCNC: 81 MG/DL (ref 70–100)
GLUCOSE UR STRIP-MCNC: NEGATIVE MG/DL
HCT VFR BLD AUTO: 43.3 % (ref 37–47)
HGB BLD-MCNC: 14.2 G/DL (ref 12–16)
HGB UR QL STRIP.AUTO: ABNORMAL
HYALINE CASTS UR QL AUTO: ABNORMAL /LPF
IMM GRANULOCYTES # BLD: 0.02 10*3/MM3 (ref 0–0.03)
IMM GRANULOCYTES NFR BLD: 0.3 % (ref 0–5)
INR PPP: 0.83 (ref 0.91–1.09)
INTERNAL NEGATIVE CONTROL: NEGATIVE
INTERNAL POSITIVE CONTROL: POSITIVE
KETONES UR QL STRIP: NEGATIVE
LEUKOCYTE ESTERASE UR QL STRIP.AUTO: ABNORMAL
LIPASE SERPL-CCNC: 57 U/L (ref 23–203)
LYMPHOCYTES # BLD AUTO: 2.94 10*3/MM3 (ref 0.72–4.86)
LYMPHOCYTES NFR BLD AUTO: 38.7 % (ref 15–45)
Lab: NORMAL
MCH RBC QN AUTO: 26.8 PG (ref 28–32)
MCHC RBC AUTO-ENTMCNC: 32.8 G/DL (ref 33–36)
MCV RBC AUTO: 81.9 FL (ref 82–98)
MONOCYTES # BLD AUTO: 0.39 10*3/MM3 (ref 0.19–1.3)
MONOCYTES NFR BLD AUTO: 5.1 % (ref 4–12)
NEUTROPHILS # BLD AUTO: 4.04 10*3/MM3 (ref 1.87–8.4)
NEUTROPHILS NFR BLD AUTO: 53.3 % (ref 39–78)
NITRITE UR QL STRIP: NEGATIVE
PH UR STRIP.AUTO: 6 [PH] (ref 5–8)
PLATELET # BLD AUTO: 311 10*3/MM3 (ref 130–400)
PMV BLD AUTO: 9.4 FL (ref 6–12)
POTASSIUM BLD-SCNC: 4.6 MMOL/L (ref 3.5–5.3)
PROT SERPL-MCNC: 8.1 G/DL (ref 6.3–8.7)
PROT UR QL STRIP: ABNORMAL
PROTHROMBIN TIME: 11.6 SECONDS (ref 11.9–14.6)
RBC # BLD AUTO: 5.29 10*6/MM3 (ref 4.2–5.4)
RBC # UR: ABNORMAL /HPF
REF LAB TEST METHOD: ABNORMAL
SODIUM BLD-SCNC: 142 MMOL/L (ref 135–145)
SP GR UR STRIP: >1.03 (ref 1–1.03)
SQUAMOUS #/AREA URNS HPF: ABNORMAL /HPF
UROBILINOGEN UR QL STRIP: ABNORMAL
WBC NRBC COR # BLD: 7.59 10*3/MM3 (ref 4.8–10.8)
WBC UR QL AUTO: ABNORMAL /HPF

## 2017-03-01 PROCEDURE — 96375 TX/PRO/DX INJ NEW DRUG ADDON: CPT

## 2017-03-01 PROCEDURE — 83690 ASSAY OF LIPASE: CPT | Performed by: NURSE PRACTITIONER

## 2017-03-01 PROCEDURE — 81001 URINALYSIS AUTO W/SCOPE: CPT | Performed by: NURSE PRACTITIONER

## 2017-03-01 PROCEDURE — 99283 EMERGENCY DEPT VISIT LOW MDM: CPT

## 2017-03-01 PROCEDURE — 80053 COMPREHEN METABOLIC PANEL: CPT | Performed by: NURSE PRACTITIONER

## 2017-03-01 PROCEDURE — 96361 HYDRATE IV INFUSION ADD-ON: CPT

## 2017-03-01 PROCEDURE — 36415 COLL VENOUS BLD VENIPUNCTURE: CPT | Performed by: NURSE PRACTITIONER

## 2017-03-01 PROCEDURE — 85610 PROTHROMBIN TIME: CPT | Performed by: NURSE PRACTITIONER

## 2017-03-01 PROCEDURE — 25010000002 CEFTRIAXONE: Performed by: NURSE PRACTITIONER

## 2017-03-01 PROCEDURE — 83605 ASSAY OF LACTIC ACID: CPT | Performed by: NURSE PRACTITIONER

## 2017-03-01 PROCEDURE — 82150 ASSAY OF AMYLASE: CPT | Performed by: NURSE PRACTITIONER

## 2017-03-01 PROCEDURE — 85025 COMPLETE CBC W/AUTO DIFF WBC: CPT | Performed by: NURSE PRACTITIONER

## 2017-03-01 PROCEDURE — 86140 C-REACTIVE PROTEIN: CPT | Performed by: NURSE PRACTITIONER

## 2017-03-01 PROCEDURE — 74177 CT ABD & PELVIS W/CONTRAST: CPT

## 2017-03-01 PROCEDURE — 85730 THROMBOPLASTIN TIME PARTIAL: CPT | Performed by: NURSE PRACTITIONER

## 2017-03-01 PROCEDURE — 87086 URINE CULTURE/COLONY COUNT: CPT | Performed by: NURSE PRACTITIONER

## 2017-03-01 PROCEDURE — 25010000002 KETOROLAC TROMETHAMINE PER 15 MG: Performed by: NURSE PRACTITIONER

## 2017-03-01 PROCEDURE — 0 IOPAMIDOL 61 % SOLUTION: Performed by: NURSE PRACTITIONER

## 2017-03-01 PROCEDURE — 87040 BLOOD CULTURE FOR BACTERIA: CPT | Performed by: NURSE PRACTITIONER

## 2017-03-01 PROCEDURE — 96365 THER/PROPH/DIAG IV INF INIT: CPT

## 2017-03-01 RX ORDER — ATOMOXETINE 60 MG/1
60 CAPSULE ORAL DAILY
COMMUNITY

## 2017-03-01 RX ORDER — PHENAZOPYRIDINE HYDROCHLORIDE 200 MG/1
200 TABLET, FILM COATED ORAL 3 TIMES DAILY PRN
Qty: 6 TABLET | Refills: 0 | Status: SHIPPED | OUTPATIENT
Start: 2017-03-01 | End: 2017-03-03

## 2017-03-01 RX ORDER — CIPROFLOXACIN 500 MG/1
500 TABLET, FILM COATED ORAL 2 TIMES DAILY
Qty: 14 TABLET | Refills: 0 | Status: SHIPPED | OUTPATIENT
Start: 2017-03-01 | End: 2017-03-08

## 2017-03-01 RX ORDER — KETOROLAC TROMETHAMINE 30 MG/ML
30 INJECTION, SOLUTION INTRAMUSCULAR; INTRAVENOUS ONCE
Status: COMPLETED | OUTPATIENT
Start: 2017-03-01 | End: 2017-03-01

## 2017-03-01 RX ORDER — SODIUM CHLORIDE 0.9 % (FLUSH) 0.9 %
10 SYRINGE (ML) INJECTION AS NEEDED
Status: DISCONTINUED | OUTPATIENT
Start: 2017-03-01 | End: 2017-03-02 | Stop reason: HOSPADM

## 2017-03-01 RX ADMIN — CEFTRIAXONE 1 G: 1 INJECTION, POWDER, FOR SOLUTION INTRAMUSCULAR; INTRAVENOUS at 21:49

## 2017-03-01 RX ADMIN — Medication 10 ML: at 18:52

## 2017-03-01 RX ADMIN — SODIUM CHLORIDE 1000 ML: 9 INJECTION, SOLUTION INTRAVENOUS at 18:53

## 2017-03-01 RX ADMIN — KETOROLAC TROMETHAMINE 30 MG: 30 INJECTION, SOLUTION INTRAMUSCULAR at 20:24

## 2017-03-01 RX ADMIN — IOPAMIDOL 100 ML: 612 INJECTION, SOLUTION INTRAVENOUS at 19:41

## 2017-03-01 NOTE — ED PROVIDER NOTES
Subjective   HPI Comments: Patient is a 28-year-old white female who presents ER today with complaint of urinary frequency and abdominal pain.  Patient reports that on 02/22/2017 she had a lithotripsy performed per Dr. Brown.  This was for a renal stone patient reports that she had been doing well.  She reports that she did note some blood in her urine since that time.  Patient states that several days ago she did have urinary frequency.  She states that she FEELS AS THOUGH SHE HAS TO URINATE AND HAS ACTUALLY BEEN UNABLE TO GET TO THE RESTROOM SEVERAL TIMES.  SHE STATES SHE IS HAVING TO USE A ANY THEN HYGIENE PADS DUE TO THIS.  PATIENT REPORTS THAT SHE IS HAVING SOME RIGHT BLOOD IN HER URINE.  PATIENT DENIES ANY FEVER NAUSEA OR VOMITING.  SHE STATES SHE DID CALL DR. LUGO'S OFFICE AND THEY DID SCHEDULE HER AN APPOINTMENT FOR NEXT Monday however she states that the pain became worse and she decided to ER for evaluation.  She presents with her spouse today for further evaluation.    Patient is a 31 y.o. female presenting with abdominal pain.   History provided by:  Patient   used: No    Abdominal Pain   Pain location:  Suprapubic  Pain quality: sharp and stabbing    Pain severity:  Moderate  Onset quality:  Sudden  Duration:  2 days  Timing:  Constant  Progression:  Worsening  Chronicity:  New  Context: not alcohol use, not awakening from sleep, not diet changes, not eating, not laxative use, not medication withdrawal, not previous surgeries, not recent illness, not recent sexual activity, not recent travel, not retching, not sick contacts, not suspicious food intake and not trauma    Relieved by:  Nothing  Worsened by:  Nothing  Ineffective treatments:  None tried  Associated symptoms: dysuria    Associated symptoms: no anorexia, no belching, no chest pain, no chills, no constipation, no cough, no diarrhea, no fatigue, no fever, no flatus, no hematemesis, no hematochezia, no hematuria, no  melena, no nausea, no shortness of breath, no sore throat, no vaginal bleeding, no vaginal discharge and no vomiting    Risk factors: no alcohol abuse, no aspirin use, not elderly, has not had multiple surgeries, no NSAID use, not obese, not pregnant and no recent hospitalization        Review of Systems   Constitutional: Negative for chills, fatigue and fever.   HENT: Negative for sore throat.    Respiratory: Negative for cough and shortness of breath.    Cardiovascular: Negative for chest pain.   Gastrointestinal: Positive for abdominal pain. Negative for anorexia, constipation, diarrhea, flatus, hematemesis, hematochezia, melena, nausea and vomiting.   Genitourinary: Positive for dysuria. Negative for hematuria, vaginal bleeding and vaginal discharge.   All other systems reviewed and are negative.      Past Medical History   Diagnosis Date   • Kidney stone on left side        Allergies   Allergen Reactions   • Adhesive Tape        Past Surgical History   Procedure Laterality Date   • Tonsillectomy     •  section     • Extracorporeal shock wave lithotripsy (eswl) Left 2017     Procedure: EXTRACORPOREAL SHOCKWAVE LITHOTRIPSY;  Surgeon: Félix Dennison MD;  Location: [facility] OR;  Service:        Family History   Problem Relation Age of Onset   • Breast cancer Sister    • Breast cancer Sister        Social History     Social History   • Marital status:      Spouse name: N/A   • Number of children: N/A   • Years of education: N/A     Social History Main Topics   • Smoking status: Never Smoker   • Smokeless tobacco: None   • Alcohol use No   • Drug use: No   • Sexual activity: Yes     Other Topics Concern   • None     Social History Narrative   • None           Objective   Physical Exam   Constitutional: She is oriented to person, place, and time. She appears well-developed and well-nourished.   HENT:   Head: Normocephalic and atraumatic.   Eyes: Pupils are equal, round, and reactive to light.    Neck: Normal range of motion. Neck supple.   Cardiovascular: Normal rate, regular rhythm and normal heart sounds.    Pulmonary/Chest: Effort normal and breath sounds normal.   Abdominal: Soft. Bowel sounds are normal. There is tenderness in the suprapubic area.   Musculoskeletal: Normal range of motion.   Neurological: She is alert and oriented to person, place, and time.   Skin: Skin is warm and dry.   Psychiatric: She has a normal mood and affect.   Nursing note and vitals reviewed.      Procedures         ED Course  ED Course   Comment By Time   CT abd/pelvis: 1. The patient has undergone left-sided lithotripsy. There are 2 tiny  residual calculi noted within the mid and lower pole calyx of the left  kidney. There is obstructive uropathy on the left, with some asymmetric  enhancement of the left kidney in comparison with the right. This is  secondary to a distal left ureteral obstruction, with several tiny  stacked stones within the distal ureter at the UVJ. None of these are  more than 2 to 3 mm in size. This is resulting in mild dilatation of the  upper tracts of the left kidney and left hydroureter.  2. No evidence of right-sided nephrolithiasis or ureteral calculus. Shantelle Garrison, APRN 03/01 2105   Patient labs at this time.  Rectal cell count 7.5.  Her urinalysis shows a large amount of blood, trace amount of protein, small amount of leuk esterase.  There is too numerous to count RBCs, 6-12 WBCs, 1+ bacteria, 13-20 squamous epithelial cells.  Her other labs are unremarkable. Shantelle Garrison, APRN 03/01 2106   She did have 2 sets of blood cultures drawn today.  She did receive Toradol for pain which did help alleviate her pain.  Patient also received Rocephin 1 g IV. Shantelle Garrison, APRN 03/01 2106   At this time patient will be discharged home in stable condition. She is asked that she please follow-up with Dr. Sultana tomorrow for an appt. have asked the patient please start an antibiotic to  treat her urinary tract infection.  Patient is advised to see Toradol that she has at home for pain.  Patient previously reports that she previously did have issues with narcotic usage she declines any narcotic pain medication at this time.  At this time she will be discharged home in stable condition.  She is asked return to the ER if any new or worsening symptoms. Shantelle Garrison, APRN 03/01 2107                  MDM  Number of Diagnoses or Management Options  Acute cystitis with hematuria: new and requires workup  Renal stones: new and requires workup     Amount and/or Complexity of Data Reviewed  Clinical lab tests: ordered and reviewed  Tests in the radiology section of CPT®: ordered and reviewed    Patient Progress  Patient progress: stable      Final diagnoses:   Renal stones   Acute cystitis with hematuria            Shantelle Garrison, APRN  03/01/17 2109

## 2017-03-01 NOTE — TELEPHONE ENCOUNTER
----- Message from Veronika Lopez MA sent at 3/1/2017 12:39 PM CST -----  Contact: [phone]  Patient called stating that she cant stop urinating on herself and wants to know if there are some meds that we can call in to help with that. She does have an appointment with us on Monday.  Dr. Sultana Patient

## 2017-03-01 NOTE — TELEPHONE ENCOUNTER
I called and spoke to the patient and she states she feels as if she is being stabbed in the bladder every time she stands. She was advised to go to [facility] [place] ER. The patient denies any fever, chills, hematuria, or N&V.  Patient verbalized understanding.

## 2017-03-02 ENCOUNTER — TELEPHONE (OUTPATIENT)
Dept: UROLOGY | Facility: CLINIC | Age: 32
End: 2017-03-02

## 2017-03-02 ENCOUNTER — OFFICE VISIT (OUTPATIENT)
Dept: UROLOGY | Facility: CLINIC | Age: 32
End: 2017-03-02

## 2017-03-02 VITALS
BODY MASS INDEX: 33.9 KG/M2 | WEIGHT: 198.6 LBS | DIASTOLIC BLOOD PRESSURE: 78 MMHG | TEMPERATURE: 97.2 F | SYSTOLIC BLOOD PRESSURE: 130 MMHG | HEIGHT: 64 IN

## 2017-03-02 DIAGNOSIS — N20.0 RENAL STONE: Primary | ICD-10-CM

## 2017-03-02 DIAGNOSIS — N13.2 HYDRONEPHROSIS WITH RENAL AND URETERAL CALCULUS OBSTRUCTION: ICD-10-CM

## 2017-03-02 DIAGNOSIS — R10.9 FLANK PAIN: ICD-10-CM

## 2017-03-02 LAB
BILIRUB BLD-MCNC: NEGATIVE MG/DL
CLARITY, POC: CLEAR
COLOR UR: YELLOW
GLUCOSE UR STRIP-MCNC: ABNORMAL MG/DL
KETONES UR QL: NEGATIVE
LEUKOCYTE EST, POC: NEGATIVE
NITRITE UR-MCNC: POSITIVE MG/ML
PH UR: 6.5 [PH] (ref 5–8)
PROT UR STRIP-MCNC: ABNORMAL MG/DL
RBC # UR STRIP: ABNORMAL /UL
SP GR UR: 1.03 (ref 1–1.03)
UROBILINOGEN UR QL: NORMAL

## 2017-03-02 PROCEDURE — 81003 URINALYSIS AUTO W/O SCOPE: CPT | Performed by: UROLOGY

## 2017-03-02 PROCEDURE — 99024 POSTOP FOLLOW-UP VISIT: CPT | Performed by: UROLOGY

## 2017-03-02 NOTE — PROGRESS NOTES
Subjective    Ms. Young is 31 y.o. female    CHIEF COMPLAINT: Postop ESWL    The patient comes back today after follow-up of ESWL she will Call the office complaining of urgency frequency dysuria some urge incontinence at the time I thought that we had cut out of explained to her that she had probably some stones at the UVJ causing her bladder symptoms and her and causing her problems.    However she went to the ER last night and was told she had a urinary tract infection but now looking at her cultures there is no growth I suspect again that her irritable bladder symptoms and dysuria and frequency were due to the small 2-3 mm calculi that were noted in the distal left ureter    CT independent review  The CT scan of the abdomen/pelvis done without contrast is available for me to review.  Treatment recommendations require an independent review.  First I scanned the liver, spleen, and bowel pattern.  The retroperitoneum including the major vessels and lymphatic packages are briefly reviewed.  This film as been reviewed by the radiologist to determine any non urologic abnormalities that are present.  The kidneys are closely inspected for size, symmetry, contour, parenchymal thickness, perinephric reaction, presence of calcifications, and intrarenal dilation of the collecting system.  The ureters are inspected for their course, caliber, and any calcifications.  The bladder is inspected for its thickness, size, and presence of any calcifications.  This scan shows:    The right kidney appears abnormal on this non contrasted CT scan.   Normal parenchymal thickness, No hydronephrosis and Mulltiple renal stones measuring 6    The left kidney appears abnormal on this non contrasted CT scan.   Normal parenchymal thickness, mild hydronephrosis, mild hydroureter and Ureteral stone measuring 3mm    The bladder appears normal on this non-contrasted CT scan.  The bladder appears normal in thickness.  There no masses or stones  seen on this exam.      History of Present Illness      The following portions of the patient's history were reviewed and updated as appropriate: allergies, current medications, past family history, past medical history, past social history, past surgical history and problem list.    Review of Systems   Constitutional: Negative for chills and fever.   Gastrointestinal: Negative for abdominal pain, anal bleeding and blood in stool.   Genitourinary: Positive for pelvic pain. Negative for difficulty urinating, flank pain, frequency, hematuria and urgency.         Current Outpatient Prescriptions:   •  atomoxetine (STRATTERA) 60 MG capsule, Take 60 mg by mouth Daily., Disp: , Rfl:   •  ciprofloxacin (CIPRO) 500 MG tablet, Take 1 tablet by mouth 2 (Two) Times a Day for 7 days., Disp: 14 tablet, Rfl: 0  •  escitalopram (LEXAPRO) 20 MG tablet, Take 30 mg by mouth Daily., Disp: , Rfl:   •  ferrous sulfate 324 (65 FE) MG tablet delayed-release EC tablet, Take 324 mg by mouth Daily With Breakfast., Disp: , Rfl:   •  ketorolac (TORADOL) 10 MG tablet, Take 10 mg by mouth Every 6 (Six) Hours As Needed for moderate pain (4-6)., Disp: , Rfl:   •  lamoTRIgine (LaMICtal) 100 MG tablet, , Disp: , Rfl:   •  LORazepam (ATIVAN) 1 MG tablet, Take 1 mg by mouth Every 8 (Eight) Hours As Needed for anxiety., Disp: , Rfl:   •  phenazopyridine (PYRIDIUM) 200 MG tablet, Take 1 tablet by mouth 3 (Three) Times a Day As Needed for bladder spasms for up to 2 days., Disp: 6 tablet, Rfl: 0  •  progesterone (PROMETRIUM) 200 MG capsule, Take 200 mg by mouth Daily., Disp: , Rfl:   •  spironolactone (ALDACTONE) 50 MG tablet, , Disp: , Rfl:   •  STRATTERA 80 MG capsule, Take 60 mg by mouth Daily., Disp: , Rfl:   •  tamsulosin (FLOMAX) 0.4 MG capsule 24 hr capsule, Take 1 capsule by mouth Daily., Disp: 30 capsule, Rfl: 3  No current facility-administered medications for this visit.     Past Medical History   Diagnosis Date   • Kidney stone on left side   "      Past Surgical History   Procedure Laterality Date   • Tonsillectomy     •  section     • Extracorporeal shock wave lithotripsy (eswl) Left 2017     Procedure: EXTRACORPOREAL SHOCKWAVE LITHOTRIPSY;  Surgeon: Félix Dennison MD;  Location: [facility] OR;  Service:        Social History     Social History   • Marital status:      Spouse name: N/A   • Number of children: N/A   • Years of education: N/A     Social History Main Topics   • Smoking status: Never Smoker   • Smokeless tobacco: None   • Alcohol use No   • Drug use: No   • Sexual activity: Yes     Other Topics Concern   • None     Social History Narrative       Family History   Problem Relation Age of Onset   • Breast cancer Sister    • Breast cancer Sister        Objective    Visit Vitals   • /78   • Temp 97.2 °F (36.2 °C)   • Ht 64\" (162.6 cm)   • Wt 198 lb 9.6 oz (90.1 kg)   • LMP 2017 (Approximate)   • BMI 34.09 kg/m2       Physical Exam      Admission on 2017, Discharged on 2017   Component Date Value Ref Range Status   • Glucose 2017 81  70 - 100 mg/dL Final   • BUN 2017 24* 5 - 21 mg/dL Final    Specimen hemolyzed. Results may be affected.   • Creatinine 2017 0.70  0.50 - 1.40 mg/dL Final   • Sodium 2017 142  135 - 145 mmol/L Final   • Potassium 2017 4.6  3.5 - 5.3 mmol/L Final    Specimen hemolyzed.  Results may be affected.   • Chloride 2017 100  98 - 110 mmol/L Final   • CO2 2017 32.0* 24.0 - 31.0 mmol/L Final   • Calcium 2017 9.3  8.4 - 10.4 mg/dL Final   • Total Protein 2017 8.1  6.3 - 8.7 g/dL Final   • Albumin 2017 4.60  3.50 - 5.00 g/dL Final   • ALT (SGPT) 2017 23  0 - 54 U/L Final    Specimen hemolyzed.  Results may be affected.   • AST (SGOT) 2017 34  7 - 45 U/L Final    Specimen hemolyzed.  Results may be affected.   • Alkaline Phosphatase 2017 59  24 - 120 U/L Final    Specimen hemolyzed. Results may be affected.   • " Total Bilirubin 03/01/2017 0.4  0.1 - 1.0 mg/dL Final   • eGFR Non African Amer 03/01/2017 96  >60 mL/min/1.73 Final   • Globulin 03/01/2017 3.5  gm/dL Final   • A/G Ratio 03/01/2017 1.3  1.1 - 2.5 g/dL Final   • BUN/Creatinine Ratio 03/01/2017 34.3* 7.0 - 25.0 Final   • Anion Gap 03/01/2017 10.0  4.0 - 13.0 mmol/L Final   • Protime 03/01/2017 11.6* 11.9 - 14.6 Seconds Final   • INR 03/01/2017 0.83* 0.91 - 1.09 Final   • PTT 03/01/2017 27.9  24.1 - 34.8 seconds Final   • Amylase 03/01/2017 52  30 - 110 U/L Final   • Lipase 03/01/2017 57  23 - 203 U/L Final   • Color, UA 03/01/2017 Yellow  Yellow, Straw Final   • Appearance, UA 03/01/2017 Cloudy* Clear Final   • pH, UA 03/01/2017 6.0  5.0 - 8.0 Final   • Specific Gravity, UA 03/01/2017 >1.030* 1.005 - 1.030 Final   • Glucose, UA 03/01/2017 Negative  Negative Final   • Ketones, UA 03/01/2017 Negative  Negative Final   • Bilirubin, UA 03/01/2017 Negative  Negative Final   • Blood, UA 03/01/2017 Large (3+)* Negative Final   • Protein, UA 03/01/2017 Trace* Negative Final   • Leuk Esterase, UA 03/01/2017 Small (1+)* Negative Final   • Nitrite, UA 03/01/2017 Negative  Negative Final   • Urobilinogen, UA 03/01/2017 1.0 E.U./dL  0.2 - 1.0 E.U./dL Final   • C-Reactive Protein 03/01/2017 0.52  0.00 - 0.99 mg/dL Final   • Lactate 03/01/2017 0.8  0.5 - 2.0 mmol/L Final   • HCG, Urine, QL 03/01/2017 Negative  Negative Final   • Lot Number 03/01/2017 [id number]   Final   • Internal Positive Control 03/01/2017 Positive   Final   • Internal Negative Control 03/01/2017 Negative   Final   • WBC 03/01/2017 7.59  4.80 - 10.80 10*3/mm3 Final   • RBC 03/01/2017 5.29  4.20 - 5.40 10*6/mm3 Final   • Hemoglobin 03/01/2017 14.2  12.0 - 16.0 g/dL Final   • Hematocrit 03/01/2017 43.3  37.0 - 47.0 % Final   • MCV 03/01/2017 81.9* 82.0 - 98.0 fL Final   • MCH 03/01/2017 26.8* 28.0 - 32.0 pg Final   • MCHC 03/01/2017 32.8* 33.0 - 36.0 g/dL Final   • RDW 03/01/2017 13.3  12.0 - 15.0 % Final   •  RDW-SD 03/01/2017 39.9* 40.0 - 54.0 fl Final   • MPV 03/01/2017 9.4  6.0 - 12.0 fL Final   • Platelets 03/01/2017 311  130 - 400 10*3/mm3 Final   • Neutrophil % 03/01/2017 53.3  39.0 - 78.0 % Final   • Lymphocyte % 03/01/2017 38.7  15.0 - 45.0 % Final   • Monocyte % 03/01/2017 5.1  4.0 - 12.0 % Final   • Eosinophil % 03/01/2017 1.7  0.0 - 4.0 % Final   • Basophil % 03/01/2017 0.9  0.0 - 2.0 % Final   • Immature Grans % 03/01/2017 0.3  0.0 - 5.0 % Final   • Neutrophils, Absolute 03/01/2017 4.04  1.87 - 8.40 10*3/mm3 Final   • Lymphocytes, Absolute 03/01/2017 2.94  0.72 - 4.86 10*3/mm3 Final   • Monocytes, Absolute 03/01/2017 0.39  0.19 - 1.30 10*3/mm3 Final   • Eosinophils, Absolute 03/01/2017 0.13  0.00 - 0.70 10*3/mm3 Final   • Basophils, Absolute 03/01/2017 0.07  0.00 - 0.20 10*3/mm3 Final   • Immature Grans, Absolute 03/01/2017 0.02  0.00 - 0.03 10*3/mm3 Final   • Urine Culture 03/01/2017 No growth at 24 hours   Preliminary   • RBC, UA 03/01/2017 Too Numerous to Count* None Seen /HPF Final   • WBC, UA 03/01/2017 6-12* None Seen /HPF Final   • Bacteria, UA 03/01/2017 1+* None Seen /HPF Final   • Squamous Epithelial Cells, UA 03/01/2017 13-20* None Seen, 0-2 /HPF Final   • Hyaline Casts, UA 03/01/2017 3-6  None Seen /LPF Final   • Methodology 03/01/2017 Automated Microscopy   Final   • Blood Culture 03/01/2017 No growth at less than 24 hours   Preliminary   • Blood Culture 03/01/2017 No growth at less than 24 hours   Preliminary       Results for orders placed or performed in visit on 03/02/17   POC Urinalysis Dipstick, Automated   Result Value Ref Range    Color Yellow Yellow, Straw, Dark Yellow, Kyung    Clarity, UA Clear Clear    Glucose,  mg/dL (A) Negative, 1000 mg/dL (3+) mg/dL    Bilirubin Negative Negative    Ketones, UA Negative Negative    Specific Gravity  1.030 1.005 - 1.030    Blood, UA Moderate (A) Negative    pH, Urine 6.5 5.0 - 8.0    Protein, POC Trace (A) Negative mg/dL    Urobilinogen, UA  Normal Normal    Leukocytes Negative Negative    Nitrite, UA Positive (A) Negative       Assessment and Plan    Diagnoses and all orders for this visit:    Renal stone  -     POC Urinalysis Dipstick, Automated  -     XR Abdomen KUB; Future    Flank pain    Hydronephrosis with renal and ureteral calculus obstruction   I had an extensive discussion with the patient and reassured her that I felt that her symptoms were all due to the small little teeny stones at the left UVJ.    She still is having some urgency frequency she again has a previous history of problems with narcotic she does not want to take Demerol and so she is taking Toradol but she does not want taking that either.    I suggest lots of fluids and give her some Flomax to take I wrote a prescription and also I gave her some samples of Myrbetriq 25 mg take to see if that will help that urgency and frequency.    She has point with me next week we will keep that with a KUB call sooner as needed    EMR Dragon/Transcription disclaimer:  Much of this encounter note is an electronic transcription/translation of spoken language to printed text. The electronic translation of spoken language may permit erroneous, or at times, nonsensical words or phrases to be inadvertently transcribed; although I have reviewed the note for such errors, some may still exist.

## 2017-03-02 NOTE — DISCHARGE INSTRUCTIONS
Please f/u with Dr. Sultana tomorrow; r/t to ER as needed, sooner if any new or worsening symptoms

## 2017-03-03 LAB — BACTERIA SPEC AEROBE CULT: ABNORMAL

## 2017-03-06 LAB
BACTERIA SPEC AEROBE CULT: NORMAL
BACTERIA SPEC AEROBE CULT: NORMAL

## 2017-03-08 ENCOUNTER — OFFICE VISIT (OUTPATIENT)
Dept: UROLOGY | Facility: CLINIC | Age: 32
End: 2017-03-08

## 2017-03-08 VITALS
SYSTOLIC BLOOD PRESSURE: 120 MMHG | DIASTOLIC BLOOD PRESSURE: 88 MMHG | TEMPERATURE: 97.9 F | WEIGHT: 194.2 LBS | HEIGHT: 64 IN | BODY MASS INDEX: 33.16 KG/M2

## 2017-03-08 DIAGNOSIS — N20.1 URETERAL STONE: ICD-10-CM

## 2017-03-08 DIAGNOSIS — N13.30 HYDRONEPHROSIS, UNSPECIFIED HYDRONEPHROSIS TYPE: Primary | ICD-10-CM

## 2017-03-08 LAB
BILIRUB BLD-MCNC: NEGATIVE MG/DL
CLARITY, POC: CLEAR
COLOR UR: YELLOW
GLUCOSE UR STRIP-MCNC: NEGATIVE MG/DL
KETONES UR QL: NEGATIVE
LEUKOCYTE EST, POC: NEGATIVE
NITRITE UR-MCNC: NEGATIVE MG/ML
PH UR: 6.5 [PH] (ref 5–8)
PROT UR STRIP-MCNC: NEGATIVE MG/DL
RBC # UR STRIP: NEGATIVE /UL
SP GR UR: 1.02 (ref 1–1.03)
UROBILINOGEN UR QL: NORMAL

## 2017-03-08 PROCEDURE — 81003 URINALYSIS AUTO W/O SCOPE: CPT | Performed by: UROLOGY

## 2017-03-08 PROCEDURE — 99024 POSTOP FOLLOW-UP VISIT: CPT | Performed by: UROLOGY

## 2017-03-08 NOTE — PROGRESS NOTES
Subjective    Ms. Young is 31 y.o. female    CHIEF COMPLAINT: Stones    The patient comes back today for follow-up after ESWL she had a Steinstrasse last week and his fragment right down her left UVJ however since then she been doing great she really cannot could not find anything she important information jaundice finger but again nothing obviously past but she feels great now    .mat  KUB independent review    A KUB is available for me to review today.  The image is inspected for a bowel gas pattern and the general bone structure of the spine and pelvis. The kidneys are then inspected closely.  Renal outline is noted if identifiable. The kidney, collecting system, and anticipated path of the ureter are examined for calcifications including those in the true pelvis.  This film reveals:    On the right there are no calcificaitons seen in the kidney or the expected course of the ureter. .    On the left there are no calcificaitons seen in the kidney or the expected course of the ureter. .      History of Present Illness      The following portions of the patient's history were reviewed and updated as appropriate: allergies, current medications, past family history, past medical history, past social history, past surgical history and problem list.    Review of Systems   Constitutional: Negative for chills and fever.   Gastrointestinal: Negative for abdominal pain, anal bleeding and blood in stool.   Genitourinary: Negative for difficulty urinating, flank pain, frequency, hematuria, pelvic pain and urgency.         Current Outpatient Prescriptions:   •  atomoxetine (STRATTERA) 60 MG capsule, Take 60 mg by mouth Daily., Disp: , Rfl:   •  escitalopram (LEXAPRO) 20 MG tablet, Take 30 mg by mouth Daily., Disp: , Rfl:   •  ferrous sulfate 324 (65 FE) MG tablet delayed-release EC tablet, Take 324 mg by mouth Daily With Breakfast., Disp: , Rfl:   •  ketorolac (TORADOL) 10 MG tablet, Take 10 mg by mouth Every 6 (Six) Hours  "As Needed for moderate pain (4-6)., Disp: , Rfl:   •  lamoTRIgine (LaMICtal) 100 MG tablet, , Disp: , Rfl:   •  LORazepam (ATIVAN) 1 MG tablet, Take 1 mg by mouth Every 8 (Eight) Hours As Needed for anxiety., Disp: , Rfl:   •  progesterone (PROMETRIUM) 200 MG capsule, Take 200 mg by mouth Daily., Disp: , Rfl:   •  spironolactone (ALDACTONE) 50 MG tablet, , Disp: , Rfl:   •  STRATTERA 80 MG capsule, Take 60 mg by mouth Daily., Disp: , Rfl:   •  tamsulosin (FLOMAX) 0.4 MG capsule 24 hr capsule, Take 1 capsule by mouth Daily., Disp: 30 capsule, Rfl: 3    Past Medical History   Diagnosis Date   • Kidney stone on left side        Past Surgical History   Procedure Laterality Date   • Tonsillectomy     •  section     • Extracorporeal shock wave lithotripsy (eswl) Left 2017     Procedure: EXTRACORPOREAL SHOCKWAVE LITHOTRIPSY;  Surgeon: Félix Dennison MD;  Location: [facility];  Service:        Social History     Social History   • Marital status:      Spouse name: N/A   • Number of children: N/A   • Years of education: N/A     Social History Main Topics   • Smoking status: Never Smoker   • Smokeless tobacco: None   • Alcohol use No   • Drug use: No   • Sexual activity: Yes     Other Topics Concern   • None     Social History Narrative       Family History   Problem Relation Age of Onset   • Breast cancer Sister    • Breast cancer Sister        Objective    Visit Vitals   • /88   • Temp 97.9 °F (36.6 °C)   • Ht 64\" (162.6 cm)   • Wt 194 lb 3.2 oz (88.1 kg)   • LMP 2017 (Approximate)   • BMI 33.33 kg/m2       Physical Exam      Office Visit on 2017   Component Date Value Ref Range Status   • Color 2017 Yellow  Yellow, Straw, Dark Yellow, Kyung Final   • Clarity, UA 2017 Clear  Clear Final   • Glucose, UA 2017 100 mg/dL* Negative, 1000 mg/dL (3+) mg/dL Final   • Bilirubin 2017 Negative  Negative Final   • Ketones, UA 2017 Negative  Negative Final   • Specific " Gravity  03/02/2017 1.030  1.005 - 1.030 Final   • Blood, UA 03/02/2017 Moderate* Negative Final   • pH, Urine 03/02/2017 6.5  5.0 - 8.0 Final   • Protein, POC 03/02/2017 Trace* Negative mg/dL Final   • Urobilinogen, UA 03/02/2017 Normal  Normal Final   • Leukocytes 03/02/2017 Negative  Negative Final   • Nitrite, UA 03/02/2017 Positive* Negative Final       Results for orders placed or performed in visit on 03/08/17   POC Urinalysis Dipstick, Automated   Result Value Ref Range    Color Yellow Yellow, Straw, Dark Yellow, Kyung    Clarity, UA Clear Clear    Glucose, UA Negative Negative, 1000 mg/dL (3+) mg/dL    Bilirubin Negative Negative    Ketones, UA Negative Negative    Specific Gravity  1.020 1.005 - 1.030    Blood, UA Negative Negative    pH, Urine 6.5 5.0 - 8.0    Protein, POC Negative Negative mg/dL    Urobilinogen, UA Normal Normal    Leukocytes Negative Negative    Nitrite, UA Negative Negative       Assessment and Plan    Iliana was seen today for hydronephrosis with renal adn ureteral calculus obstruction.    Diagnoses and all orders for this visit:    Hydronephrosis, unspecified hydronephrosis type  -     POC Urinalysis Dipstick, Automated    Ureteral stone   plan--the patient is doing well now I see no further stones I did discuss dietary section with her gave her preprinted sheet etc.    We will see her back in 3-4 months with an ultrasound and KUB  EMR Dragon/Transcription disclaimer:  Much of this encounter note is an electronic transcription/translation of spoken language to printed text. The electronic translation of spoken language may permit erroneous, or at times, nonsensical words or phrases to be inadvertently transcribed; although I have reviewed the note for such errors, some may still exist.

## 2017-06-06 RX ORDER — SPIRONOLACTONE 50 MG/1
TABLET, FILM COATED ORAL
Qty: 90 TABLET | Refills: 5 | Status: SHIPPED | OUTPATIENT
Start: 2017-06-06 | End: 2020-01-07

## 2017-06-13 ENCOUNTER — OFFICE VISIT (OUTPATIENT)
Dept: FAMILY MEDICINE CLINIC | Age: 32
End: 2017-06-13
Payer: COMMERCIAL

## 2017-06-13 VITALS
DIASTOLIC BLOOD PRESSURE: 88 MMHG | HEART RATE: 89 BPM | WEIGHT: 197 LBS | TEMPERATURE: 98.2 F | BODY MASS INDEX: 33.81 KG/M2 | OXYGEN SATURATION: 98 % | SYSTOLIC BLOOD PRESSURE: 116 MMHG

## 2017-06-13 DIAGNOSIS — Z02.1 PRE-EMPLOYMENT HEALTH SCREENING EXAMINATION: Primary | ICD-10-CM

## 2017-06-13 PROCEDURE — 99213 OFFICE O/P EST LOW 20 MIN: CPT | Performed by: NURSE PRACTITIONER

## 2017-06-13 ASSESSMENT — ENCOUNTER SYMPTOMS
VOMITING: 0
NAUSEA: 0
SHORTNESS OF BREATH: 0
DIARRHEA: 0
ABDOMINAL PAIN: 0

## 2017-08-29 ENCOUNTER — OFFICE VISIT (OUTPATIENT)
Dept: FAMILY MEDICINE CLINIC | Age: 32
End: 2017-08-29
Payer: COMMERCIAL

## 2017-08-29 VITALS
OXYGEN SATURATION: 98 % | RESPIRATION RATE: 20 BRPM | TEMPERATURE: 98.6 F | HEART RATE: 88 BPM | BODY MASS INDEX: 35.7 KG/M2 | DIASTOLIC BLOOD PRESSURE: 72 MMHG | WEIGHT: 208 LBS | SYSTOLIC BLOOD PRESSURE: 116 MMHG

## 2017-08-29 DIAGNOSIS — J06.9 ACUTE URI: Primary | ICD-10-CM

## 2017-08-29 PROCEDURE — 99213 OFFICE O/P EST LOW 20 MIN: CPT | Performed by: NURSE PRACTITIONER

## 2017-08-29 RX ORDER — LORATADINE 10 MG/1
10 TABLET ORAL DAILY
Qty: 30 TABLET | Refills: 5 | Status: SHIPPED | OUTPATIENT
Start: 2017-08-29 | End: 2017-10-24

## 2017-08-29 RX ORDER — FLUTICASONE PROPIONATE 50 MCG
2 SPRAY, SUSPENSION (ML) NASAL DAILY
Qty: 1 BOTTLE | Refills: 5 | Status: SHIPPED | OUTPATIENT
Start: 2017-08-29 | End: 2020-01-21

## 2017-08-29 ASSESSMENT — ENCOUNTER SYMPTOMS
COUGH: 1
SORE THROAT: 1

## 2017-10-24 ENCOUNTER — OFFICE VISIT (OUTPATIENT)
Dept: FAMILY MEDICINE CLINIC | Age: 32
End: 2017-10-24
Payer: COMMERCIAL

## 2017-10-24 VITALS
BODY MASS INDEX: 37.66 KG/M2 | WEIGHT: 219.4 LBS | SYSTOLIC BLOOD PRESSURE: 116 MMHG | DIASTOLIC BLOOD PRESSURE: 84 MMHG | RESPIRATION RATE: 16 BRPM | HEART RATE: 78 BPM | OXYGEN SATURATION: 98 % | TEMPERATURE: 98.3 F

## 2017-10-24 DIAGNOSIS — D50.8 OTHER IRON DEFICIENCY ANEMIA: ICD-10-CM

## 2017-10-24 DIAGNOSIS — E66.09 CLASS 2 OBESITY DUE TO EXCESS CALORIES WITH BODY MASS INDEX (BMI) OF 37.0 TO 37.9 IN ADULT, UNSPECIFIED WHETHER SERIOUS COMORBIDITY PRESENT: Primary | ICD-10-CM

## 2017-10-24 LAB
FERRITIN: 31.1 NG/ML (ref 13–150)
HCT VFR BLD CALC: 43.7 % (ref 37–47)
HEMOGLOBIN: 13.8 G/DL (ref 12–16)
IRON: 132 UG/DL (ref 37–145)
MCH RBC QN AUTO: 26.6 PG (ref 27–31)
MCHC RBC AUTO-ENTMCNC: 31.6 G/DL (ref 33–37)
MCV RBC AUTO: 84.2 FL (ref 81–99)
PDW BLD-RTO: 13.2 % (ref 11.5–14.5)
PLATELET # BLD: 306 K/UL (ref 130–400)
PMV BLD AUTO: 9.4 FL (ref 9.4–12.3)
RBC # BLD: 5.19 M/UL (ref 4.2–5.4)
TOTAL IRON BINDING CAPACITY: 393 UG/DL (ref 250–400)
WBC # BLD: 7 K/UL (ref 4.8–10.8)

## 2017-10-24 PROCEDURE — 99213 OFFICE O/P EST LOW 20 MIN: CPT | Performed by: NURSE PRACTITIONER

## 2017-10-24 RX ORDER — PHENTERMINE HYDROCHLORIDE 37.5 MG/1
37.5 TABLET ORAL
Qty: 30 TABLET | Refills: 0 | Status: SHIPPED | OUTPATIENT
Start: 2017-10-24 | End: 2017-11-23

## 2017-10-24 NOTE — PROGRESS NOTES
Subjective:      Patient ID: Ruffin Cockayne is a 27 y.o. female. Chief Complaint   Patient presents with    Fatigue     needs iron level rechecked    Weight Gain       HPI  Patient would like iron level checked for her chronic anemia. She also c/o weight gain. She has stopped vyvanse and she has gained 21 pounds in the past few months. She is trying eat right and wants to lose weight. She requested medication a few months ago, but was denied bc she was taking vyvanse. The vyvanse was stopped bc it was not helping her adult add. Review of Systems   Constitutional: Positive for fatigue and unexpected weight change. Allergies   Allergen Reactions    Adhesive Tape      Current Outpatient Prescriptions on File Prior to Visit   Medication Sig Dispense Refill    spironolactone (ALDACTONE) 50 MG tablet TAKE 1 TABLET DAILY 90 tablet 5    ferrous sulfate 325 (65 FE) MG tablet Take 1 tablet by mouth daily (with breakfast) 90 tablet 1    lamoTRIgine (LAMICTAL) 100 MG tablet Take 100 mg by mouth daily       Multiple Vitamin (MULTI-VITAMINS PO) Take  by mouth.  escitalopram (LEXAPRO) 20 MG tablet Take 20 mg by mouth daily       LORazepam (ATIVAN) 1 MG tablet Take 1 mg by mouth daily as needed for Anxiety.  fluticasone (FLONASE) 50 MCG/ACT nasal spray 2 sprays by Nasal route daily 2 sprays daily to each nostril 1 Bottle 5     No current facility-administered medications on file prior to visit. Past Medical History:   Diagnosis Date    Anxiety     Back pain     Depression     Hepatitis C     History of anemia     History of gallstones     History of kidney stones     Knee pain     left    Postpartum depression          Objective:   Physical Exam   Constitutional: She is oriented to person, place, and time. She appears well-developed and well-nourished. No distress. HENT:   Head: Normocephalic.    Right Ear: External ear normal.   Left Ear: External ear normal.   Nose: Nose normal. Mouth/Throat: Oropharynx is clear and moist. No oropharyngeal exudate. Eyes: Conjunctivae are normal. Pupils are equal, round, and reactive to light. Neck: Normal range of motion. Neck supple. Cardiovascular: Normal rate, regular rhythm and normal heart sounds. Pulmonary/Chest: Effort normal and breath sounds normal. No respiratory distress. Neurological: She is alert and oriented to person, place, and time. Skin: Skin is warm and dry. She is not diaphoretic. Psychiatric: Her behavior is normal. Thought content normal.   Nursing note and vitals reviewed. /84 (Site: Left Arm, Position: Sitting, Cuff Size: Large Adult)   Pulse 78   Temp 98.3 °F (36.8 °C) (Oral)   Resp 16   Wt 219 lb 6.4 oz (99.5 kg)   LMP 10/24/2017 (Exact Date)   SpO2 98%   BMI 37.66 kg/m²     Assessment:      1. Class 2 obesity due to excess calories with body mass index (BMI) of 37.0 to 37.9 in adult, unspecified whether serious comorbidity present  phentermine (ADIPEX-P) 37.5 MG tablet   2. Other iron deficiency anemia  Ferritin    Fe+TIBC+Mahendra    Iron    CBC           Plan:      Start diet and exercise. Follow up one month for weight and bp check. Start with half tablet in morning. Patient educated that this medication was only temporary adjunct to diet and lifestyle changes.

## 2018-05-08 ENCOUNTER — OFFICE VISIT (OUTPATIENT)
Dept: FAMILY MEDICINE CLINIC | Age: 33
End: 2018-05-08
Payer: MEDICAID

## 2018-05-08 VITALS
HEIGHT: 64 IN | HEART RATE: 90 BPM | BODY MASS INDEX: 39.27 KG/M2 | WEIGHT: 230 LBS | OXYGEN SATURATION: 98 % | DIASTOLIC BLOOD PRESSURE: 62 MMHG | TEMPERATURE: 98.7 F | RESPIRATION RATE: 20 BRPM | SYSTOLIC BLOOD PRESSURE: 120 MMHG

## 2018-05-08 DIAGNOSIS — F32.A DEPRESSION, UNSPECIFIED DEPRESSION TYPE: ICD-10-CM

## 2018-05-08 DIAGNOSIS — M13.0 POLYARTHRITIS: ICD-10-CM

## 2018-05-08 DIAGNOSIS — R70.0 ELEVATED SED RATE: Primary | ICD-10-CM

## 2018-05-08 DIAGNOSIS — M13.0 POLYARTHRITIS: Primary | ICD-10-CM

## 2018-05-08 LAB
ALBUMIN SERPL-MCNC: 3.8 G/DL (ref 3.5–5.2)
ALP BLD-CCNC: 69 U/L (ref 35–104)
ALT SERPL-CCNC: 11 U/L (ref 5–33)
ANION GAP SERPL CALCULATED.3IONS-SCNC: 13 MMOL/L (ref 7–19)
AST SERPL-CCNC: 14 U/L (ref 5–32)
BASOPHILS ABSOLUTE: 0.1 K/UL (ref 0–0.2)
BASOPHILS RELATIVE PERCENT: 0.7 % (ref 0–1)
BILIRUB SERPL-MCNC: <0.2 MG/DL (ref 0.2–1.2)
BILIRUBIN URINE: NEGATIVE
BLOOD, URINE: NEGATIVE
BUN BLDV-MCNC: 15 MG/DL (ref 6–20)
C-REACTIVE PROTEIN: 3.7 MG/DL (ref 0–0.5)
CALCIUM SERPL-MCNC: 9.2 MG/DL (ref 8.6–10)
CHLORIDE BLD-SCNC: 100 MMOL/L (ref 98–111)
CLARITY: CLEAR
CO2: 27 MMOL/L (ref 22–29)
COLOR: NORMAL
CREAT SERPL-MCNC: 0.5 MG/DL (ref 0.5–0.9)
EOSINOPHILS ABSOLUTE: 0.1 K/UL (ref 0–0.6)
EOSINOPHILS RELATIVE PERCENT: 1.2 % (ref 0–5)
GFR NON-AFRICAN AMERICAN: >60
GLUCOSE BLD-MCNC: 89 MG/DL (ref 74–109)
GLUCOSE URINE: NEGATIVE MG/DL
HCT VFR BLD CALC: 42.2 % (ref 37–47)
HEMOGLOBIN: 12.9 G/DL (ref 12–16)
KETONES, URINE: NEGATIVE MG/DL
LEUKOCYTE ESTERASE, URINE: NEGATIVE
LYMPHOCYTES ABSOLUTE: 1.7 K/UL (ref 1.1–4.5)
LYMPHOCYTES RELATIVE PERCENT: 20.5 % (ref 20–40)
MCH RBC QN AUTO: 24.8 PG (ref 27–31)
MCHC RBC AUTO-ENTMCNC: 30.6 G/DL (ref 33–37)
MCV RBC AUTO: 81 FL (ref 81–99)
MONOCYTES ABSOLUTE: 0.6 K/UL (ref 0–0.9)
MONOCYTES RELATIVE PERCENT: 7 % (ref 0–10)
NEUTROPHILS ABSOLUTE: 5.8 K/UL (ref 1.5–7.5)
NEUTROPHILS RELATIVE PERCENT: 70.1 % (ref 50–65)
NITRITE, URINE: NEGATIVE
PDW BLD-RTO: 13.7 % (ref 11.5–14.5)
PH UA: 6.5
PLATELET # BLD: 349 K/UL (ref 130–400)
PMV BLD AUTO: 8.9 FL (ref 9.4–12.3)
POTASSIUM SERPL-SCNC: 4.3 MMOL/L (ref 3.5–5)
PROTEIN UA: NEGATIVE MG/DL
RBC # BLD: 5.21 M/UL (ref 4.2–5.4)
RHEUMATOID FACTOR: <10 IU/ML
SEDIMENTATION RATE, ERYTHROCYTE: 52 MM/HR (ref 0–20)
SODIUM BLD-SCNC: 140 MMOL/L (ref 136–145)
SPECIFIC GRAVITY UA: 1.03
T4 FREE: 1.1 NG/DL (ref 0.9–1.7)
TOTAL PROTEIN: 7.3 G/DL (ref 6.6–8.7)
TSH SERPL DL<=0.05 MIU/L-ACNC: 0.8 UIU/ML (ref 0.27–4.2)
URIC ACID, SERUM: 3.6 MG/DL (ref 2.4–5.7)
URINE REFLEX TO CULTURE: NORMAL
UROBILINOGEN, URINE: 0.2 E.U./DL
WBC # BLD: 8.3 K/UL (ref 4.8–10.8)

## 2018-05-08 PROCEDURE — 99214 OFFICE O/P EST MOD 30 MIN: CPT | Performed by: NURSE PRACTITIONER

## 2018-05-08 ASSESSMENT — ENCOUNTER SYMPTOMS
DIARRHEA: 0
SHORTNESS OF BREATH: 0
VOMITING: 0
TROUBLE SWALLOWING: 0
NAUSEA: 0
ABDOMINAL PAIN: 0
CONSTIPATION: 0

## 2018-05-08 ASSESSMENT — PATIENT HEALTH QUESTIONNAIRE - PHQ9
SUM OF ALL RESPONSES TO PHQ QUESTIONS 1-9: 0
2. FEELING DOWN, DEPRESSED OR HOPELESS: 0
1. LITTLE INTEREST OR PLEASURE IN DOING THINGS: 0
SUM OF ALL RESPONSES TO PHQ9 QUESTIONS 1 & 2: 0

## 2018-05-10 LAB
ANA IGG, ELISA: NORMAL
ANTISTREPTOLYSIN-O: <55 IU/ML (ref 0–330)
THYROID PEROXIDASE (TPO) ABS: 0.4 IU/ML (ref 0–9)

## 2018-05-11 LAB — T3 REVERSE: 15 NG/DL (ref 9–27)

## 2018-05-17 ENCOUNTER — TELEPHONE (OUTPATIENT)
Dept: FAMILY MEDICINE CLINIC | Age: 33
End: 2018-05-17

## 2018-05-21 ENCOUNTER — TELEPHONE (OUTPATIENT)
Dept: FAMILY MEDICINE CLINIC | Age: 33
End: 2018-05-21

## 2018-06-06 ENCOUNTER — TELEPHONE (OUTPATIENT)
Dept: FAMILY MEDICINE CLINIC | Age: 33
End: 2018-06-06

## 2018-06-12 ENCOUNTER — OFFICE VISIT (OUTPATIENT)
Dept: FAMILY MEDICINE CLINIC | Age: 33
End: 2018-06-12
Payer: MEDICAID

## 2018-06-12 VITALS
HEIGHT: 64 IN | TEMPERATURE: 98.4 F | WEIGHT: 235 LBS | RESPIRATION RATE: 20 BRPM | HEART RATE: 94 BPM | DIASTOLIC BLOOD PRESSURE: 80 MMHG | SYSTOLIC BLOOD PRESSURE: 122 MMHG | OXYGEN SATURATION: 98 % | BODY MASS INDEX: 40.12 KG/M2

## 2018-06-12 DIAGNOSIS — E66.01 MORBID OBESITY WITH BMI OF 40.0-44.9, ADULT (HCC): Primary | ICD-10-CM

## 2018-06-12 DIAGNOSIS — F41.9 ANXIETY AND DEPRESSION: ICD-10-CM

## 2018-06-12 DIAGNOSIS — F32.A ANXIETY AND DEPRESSION: ICD-10-CM

## 2018-06-12 DIAGNOSIS — E28.2 PCOS (POLYCYSTIC OVARIAN SYNDROME): ICD-10-CM

## 2018-06-12 PROCEDURE — 99214 OFFICE O/P EST MOD 30 MIN: CPT | Performed by: NURSE PRACTITIONER

## 2018-06-12 ASSESSMENT — ENCOUNTER SYMPTOMS
ABDOMINAL PAIN: 0
SHORTNESS OF BREATH: 0
TROUBLE SWALLOWING: 0

## 2018-06-19 ENCOUNTER — TELEPHONE (OUTPATIENT)
Dept: FAMILY MEDICINE CLINIC | Age: 33
End: 2018-06-19

## 2018-07-06 ENCOUNTER — TELEPHONE (OUTPATIENT)
Dept: FAMILY MEDICINE CLINIC | Age: 33
End: 2018-07-06

## 2018-07-08 NOTE — TELEPHONE ENCOUNTER
Dr. Yasmin Thomas office saw her with out the letter. I did not realize they still needed the letter. As they told me that was inorder to see her. Can they resend please and ill get it done this week.

## 2018-07-09 NOTE — TELEPHONE ENCOUNTER
Sanjuanita Harrell has letter and will be placing on your desk.   This always has to be done for bariatric referral.

## 2018-07-30 ENCOUNTER — OFFICE VISIT (OUTPATIENT)
Dept: PSYCHIATRY | Age: 33
End: 2018-07-30
Payer: MEDICAID

## 2018-07-30 DIAGNOSIS — F43.23 ADJUSTMENT DISORDER WITH MIXED ANXIETY AND DEPRESSED MOOD: Primary | ICD-10-CM

## 2018-07-30 PROCEDURE — 90791 PSYCH DIAGNOSTIC EVALUATION: CPT | Performed by: COUNSELOR
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
## 2018-09-24 ENCOUNTER — OFFICE VISIT (OUTPATIENT)
Dept: PSYCHIATRY | Age: 33
End: 2018-09-24
Payer: MEDICAID

## 2018-09-24 VITALS
DIASTOLIC BLOOD PRESSURE: 91 MMHG | HEIGHT: 64 IN | OXYGEN SATURATION: 96 % | HEART RATE: 89 BPM | WEIGHT: 239 LBS | SYSTOLIC BLOOD PRESSURE: 144 MMHG | BODY MASS INDEX: 40.8 KG/M2

## 2018-09-24 DIAGNOSIS — F90.9 ATTENTION DEFICIT HYPERACTIVITY DISORDER (ADHD), UNSPECIFIED ADHD TYPE: ICD-10-CM

## 2018-09-24 DIAGNOSIS — F43.23 ADJUSTMENT DISORDER WITH MIXED ANXIETY AND DEPRESSED MOOD: Primary | ICD-10-CM

## 2018-09-24 PROCEDURE — 99215 OFFICE O/P EST HI 40 MIN: CPT | Performed by: CLINICAL NURSE SPECIALIST
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
## 2020-01-07 ENCOUNTER — OFFICE VISIT (OUTPATIENT)
Dept: PRIMARY CARE CLINIC | Age: 35
End: 2020-01-07
Payer: MEDICAID

## 2020-01-07 VITALS
DIASTOLIC BLOOD PRESSURE: 84 MMHG | OXYGEN SATURATION: 97 % | HEIGHT: 64 IN | RESPIRATION RATE: 16 BRPM | SYSTOLIC BLOOD PRESSURE: 130 MMHG | TEMPERATURE: 98 F | HEART RATE: 80 BPM | WEIGHT: 188.8 LBS | BODY MASS INDEX: 32.23 KG/M2

## 2020-01-07 PROCEDURE — 99203 OFFICE O/P NEW LOW 30 MIN: CPT | Performed by: FAMILY MEDICINE

## 2020-01-07 RX ORDER — DEXTROAMPHETAMINE SACCHARATE, AMPHETAMINE ASPARTATE, DEXTROAMPHETAMINE SULFATE AND AMPHETAMINE SULFATE 2.5; 2.5; 2.5; 2.5 MG/1; MG/1; MG/1; MG/1
10 TABLET ORAL DAILY
COMMUNITY
Start: 2020-01-06

## 2020-01-07 RX ORDER — LORAZEPAM 0.5 MG/1
0.5 TABLET ORAL NIGHTLY
COMMUNITY
Start: 2019-12-03 | End: 2022-03-15

## 2020-01-10 ENCOUNTER — TELEPHONE (OUTPATIENT)
Dept: PRIMARY CARE CLINIC | Age: 35
End: 2020-01-10

## 2020-01-10 NOTE — TELEPHONE ENCOUNTER
I have ordered the ultrasound but I am not sure. Alice Guardado has a note for that she wants that scheduled on a certain day and time but I am not sure if she is already scheduled it or not at [address].

## 2020-01-16 ENCOUNTER — HOSPITAL ENCOUNTER (OUTPATIENT)
Dept: VASCULAR LAB | Age: 35
Discharge: HOME | End: 2020-01-16
Payer: MEDICAID

## 2020-01-16 DIAGNOSIS — E28.2 PCOS (POLYCYSTIC OVARIAN SYNDROME): ICD-10-CM

## 2020-01-16 DIAGNOSIS — Z00.00 WELLNESS EXAMINATION: ICD-10-CM

## 2020-01-16 LAB
ALBUMIN SERPL-MCNC: 3.9 G/DL (ref 3.5–5.2)
ALP BLD-CCNC: 62 U/L (ref 35–104)
ALT SERPL-CCNC: 13 U/L (ref 5–33)
ANION GAP SERPL CALCULATED.3IONS-SCNC: 10 MMOL/L (ref 7–19)
AST SERPL-CCNC: 17 U/L (ref 5–32)
BASOPHILS ABSOLUTE: 0.1 K/UL (ref 0–0.2)
BASOPHILS RELATIVE PERCENT: 1.1 % (ref 0–1)
BILIRUB SERPL-MCNC: 0.3 MG/DL (ref 0.2–1.2)
BUN BLDV-MCNC: 15 MG/DL (ref 6–20)
CALCIUM SERPL-MCNC: 8.7 MG/DL (ref 8.6–10)
CHLORIDE BLD-SCNC: 100 MMOL/L (ref 98–111)
CO2: 28 MMOL/L (ref 22–29)
CREAT SERPL-MCNC: 0.7 MG/DL (ref 0.5–0.9)
EOSINOPHILS ABSOLUTE: 0.2 K/UL (ref 0–0.6)
EOSINOPHILS RELATIVE PERCENT: 3.2 % (ref 0–5)
GFR NON-AFRICAN AMERICAN: >60
GLUCOSE BLD-MCNC: 95 MG/DL (ref 74–109)
HBA1C MFR BLD: 5.2 % (ref 4–6)
HCT VFR BLD CALC: 42.2 % (ref 37–47)
HEMOGLOBIN: 12.9 G/DL (ref 12–16)
IMMATURE GRANULOCYTES #: 0 K/UL
INSULIN: 7.1 MU/L (ref 2.6–24.9)
LYMPHOCYTES ABSOLUTE: 1.5 K/UL (ref 1.1–4.5)
LYMPHOCYTES RELATIVE PERCENT: 28.4 % (ref 20–40)
MCH RBC QN AUTO: 26.1 PG (ref 27–31)
MCHC RBC AUTO-ENTMCNC: 30.6 G/DL (ref 33–37)
MCV RBC AUTO: 85.3 FL (ref 81–99)
MONOCYTES ABSOLUTE: 0.4 K/UL (ref 0–0.9)
MONOCYTES RELATIVE PERCENT: 7.2 % (ref 0–10)
NEUTROPHILS ABSOLUTE: 3.2 K/UL (ref 1.5–7.5)
NEUTROPHILS RELATIVE PERCENT: 59.7 % (ref 50–65)
PDW BLD-RTO: 14.6 % (ref 11.5–14.5)
PLATELET # BLD: 290 K/UL (ref 130–400)
PMV BLD AUTO: 9.1 FL (ref 9.4–12.3)
POTASSIUM SERPL-SCNC: 4.3 MMOL/L (ref 3.5–5)
RBC # BLD: 4.95 M/UL (ref 4.2–5.4)
SODIUM BLD-SCNC: 138 MMOL/L (ref 136–145)
TOTAL PROTEIN: 7.4 G/DL (ref 6.6–8.7)
TSH SERPL DL<=0.05 MIU/L-ACNC: 1.08 UIU/ML (ref 0.27–4.2)
WBC # BLD: 5.3 K/UL (ref 4.8–10.8)

## 2020-01-16 PROCEDURE — 93970 EXTREMITY STUDY: CPT

## 2020-01-17 ASSESSMENT — ENCOUNTER SYMPTOMS
EYE DISCHARGE: 0
VOMITING: 0
COLOR CHANGE: 0
ABDOMINAL PAIN: 0
WHEEZING: 0
COUGH: 0
DIARRHEA: 0
NAUSEA: 0
BACK PAIN: 0

## 2020-01-17 NOTE — PROGRESS NOTES
SUBJECTIVE:    Patient ID: Hector Valdez is a 37 y.o. female. HPI:     Patient presents today to establish care. She states that she had a gastric sleeve done almost a year ago. She states that she has not lost as much weight as what she has expected to lose as well as what they expected her to lose. She states that she does have PCOS and she is afraid that this is inhibited her weight loss. She states that she was on metformin a few years ago but she has not been on anything recently. She states that she is trying to really watch her diet and her intake. She states that she is not exercising as much as she would like to this point although she is going to start exercising. She states that she is having a lot of trouble with leg swelling. She has one leg that has been swollen for a couple of years. She states that it is continue to be a problem for her. She states that it has her left leg. She states that She has not had any blood clots. She states that it did not really get any better after her weight loss. She does have a history of anxiety and depression and is currently on Lamictal, Ativan, and Adderall. She also is on Lexapro. Past Medical History:   Diagnosis Date    Anxiety     Back pain     Depression     Hepatitis C     History of anemia     History of gallstones     History of kidney stones     Knee pain     left    Postpartum depression      Current Outpatient Medications on File Prior to Visit   Medication Sig Dispense Refill    amphetamine-dextroamphetamine (ADDERALL) 10 MG tablet Take 10 mg by mouth daily. Dr Paz Hughes      LORazepam (ATIVAN) 0.5 MG tablet Take 0.5 mg by mouth nightly.  lamoTRIgine (LAMICTAL) 100 MG tablet Take 200 mg by mouth daily       Multiple Vitamin (MULTI-VITAMINS PO) Take  by mouth.       escitalopram (LEXAPRO) 20 MG tablet Take 30 mg by mouth daily       fluticasone (FLONASE) 50 MCG/ACT nasal spray 2 sprays by Nasal route daily 2 sprays daily to each nostril 1 Bottle 5     No current facility-administered medications on file prior to visit.       Allergies   Allergen Reactions    Adhesive Tape      Past Surgical History:   Procedure Laterality Date     SECTION      TONSILLECTOMY       Family History   Problem Relation Age of Onset   Rita Susie Cancer Sister         skin cancer, leukemia    Heart Disease Sister     Breast Cancer Sister     Celiac Disease Sister     Other Sister         lyme dz    Diabetes Mother     Lung Cancer Maternal Grandfather     Cancer Maternal Grandfather         throat cancer    Colon Cancer Neg Hx     Colon Polyps Neg Hx     Esophageal Cancer Neg Hx     Liver Cancer Neg Hx     Liver Disease Neg Hx     Rectal Cancer Neg Hx     Stomach Cancer Neg Hx     Thyroid Disease Mother     Thyroid Disease Sister      Social History     Socioeconomic History    Marital status:      Spouse name: Not on file    Number of children: Not on file    Years of education: Not on file    Highest education level: Not on file   Occupational History    Not on file   Social Needs    Financial resource strain: Not on file    Food insecurity:     Worry: Not on file     Inability: Not on file    Transportation needs:     Medical: Not on file     Non-medical: Not on file   Tobacco Use    Smoking status: Former Smoker     Last attempt to quit: 2010     Years since quittin.6    Smokeless tobacco: Never Used   Substance and Sexual Activity    Alcohol use: No    Drug use: Yes     Types: Marijuana, Cocaine, IV     Comment: Last used 2010    Sexual activity: Not on file   Lifestyle    Physical activity:     Days per week: Not on file     Minutes per session: Not on file    Stress: Not on file   Relationships    Social connections:     Talks on phone: Not on file     Gets together: Not on file     Attends [employer] service: Not on file     Active member of club or organization: Not on file     Attends meetings of clubs or organizations: Not on file     Relationship status: Not on file    Intimate partner violence:     Fear of current or ex partner: Not on file     Emotionally abused: Not on file     Physically abused: Not on file     Forced sexual activity: Not on file   Other Topics Concern    Not on file   Social History Narrative    Not on file       Review of Systems   Constitutional: Negative for activity change, appetite change and fever. HENT: Negative for congestion and nosebleeds. Eyes: Negative for discharge. Respiratory: Negative for cough and wheezing. Cardiovascular: Positive for leg swelling (left). Negative for chest pain. Gastrointestinal: Negative for abdominal pain, diarrhea, nausea and vomiting. Genitourinary: Negative for difficulty urinating, frequency and urgency. Musculoskeletal: Negative for back pain and gait problem. Skin: Negative for color change and rash. Neurological: Negative for dizziness and headaches. Hematological: Does not bruise/bleed easily. Psychiatric/Behavioral: Negative for sleep disturbance and suicidal ideas. OBJECTIVE:    Physical Exam  Constitutional:       General: She is not in acute distress. Appearance: Normal appearance. She is well-developed. She is obese. She is not diaphoretic. HENT:      Head: Normocephalic and atraumatic. Right Ear: External ear normal.      Left Ear: External ear normal.      Nose: Nose normal.      Mouth/Throat:      Mouth: Mucous membranes are moist.   Neck:      Musculoskeletal: Normal range of motion and neck supple. Cardiovascular:      Rate and Rhythm: Normal rate and regular rhythm. Heart sounds: Normal heart sounds. No murmur. Pulmonary:      Effort: Pulmonary effort is normal. No respiratory distress. Breath sounds: Normal breath sounds. Abdominal:      General: Abdomen is flat. Bowel sounds are normal.      Palpations: Abdomen is soft.    Musculoskeletal:         General:

## 2020-01-21 ENCOUNTER — OFFICE VISIT (OUTPATIENT)
Dept: PRIMARY CARE CLINIC | Age: 35
End: 2020-01-21
Payer: MEDICAID

## 2020-01-21 VITALS
HEIGHT: 65 IN | DIASTOLIC BLOOD PRESSURE: 86 MMHG | WEIGHT: 188 LBS | RESPIRATION RATE: 20 BRPM | BODY MASS INDEX: 31.32 KG/M2 | TEMPERATURE: 98 F | OXYGEN SATURATION: 98 % | HEART RATE: 87 BPM | SYSTOLIC BLOOD PRESSURE: 132 MMHG

## 2020-01-21 PROCEDURE — 99214 OFFICE O/P EST MOD 30 MIN: CPT | Performed by: FAMILY MEDICINE

## 2020-01-21 RX ORDER — METFORMIN HYDROCHLORIDE 500 MG/1
500 TABLET, EXTENDED RELEASE ORAL
Qty: 30 TABLET | Refills: 5 | Status: SHIPPED | OUTPATIENT
Start: 2020-01-21 | End: 2020-01-23 | Stop reason: SDUPTHER

## 2020-01-21 ASSESSMENT — ENCOUNTER SYMPTOMS
NAUSEA: 0
COLOR CHANGE: 0
BACK PAIN: 0
VOMITING: 0
EYE DISCHARGE: 0
WHEEZING: 0
COUGH: 0
ABDOMINAL PAIN: 0
DIARRHEA: 0

## 2020-01-21 NOTE — PROGRESS NOTES
facility-administered medications for this visit. Allergies   Allergen Reactions    Adhesive Tape        Review of Systems   Constitutional: Negative for activity change, appetite change and fever. HENT: Negative for congestion and nosebleeds. Eyes: Negative for discharge. Respiratory: Negative for cough and wheezing. Cardiovascular: Negative for chest pain and leg swelling. Gastrointestinal: Negative for abdominal pain, diarrhea, nausea and vomiting. Genitourinary: Negative for difficulty urinating, frequency and urgency. Musculoskeletal: Negative for back pain and gait problem. Skin: Negative for color change and rash. Neurological: Negative for dizziness and headaches. Hematological: Does not bruise/bleed easily. Psychiatric/Behavioral: Negative for sleep disturbance and suicidal ideas. OBJECTIVE:     Physical Exam  Constitutional:       General: She is not in acute distress. Appearance: She is well-developed. She is not diaphoretic. HENT:      Head: Normocephalic and atraumatic. Neck:      Musculoskeletal: Normal range of motion and neck supple. Cardiovascular:      Rate and Rhythm: Normal rate and regular rhythm. Heart sounds: Normal heart sounds. No murmur. Pulmonary:      Effort: Pulmonary effort is normal. No respiratory distress. Breath sounds: Normal breath sounds. Skin:     General: Skin is warm and dry. Neurological:      Mental Status: She is alert and oriented to person, place, and time. Psychiatric:         Behavior: Behavior normal.         Thought Content: Thought content normal.         Judgment: Judgment normal.        /86 (Site: Right Upper Arm, Position: Sitting, Cuff Size: Large Adult)   Pulse 87   Temp 98 °F (36.7 °C) (Temporal)   Resp 20   Ht 5' 4.5\" (1.638 m)   Wt 188 lb (85.3 kg)   LMP 12/17/2019 (Approximate)   SpO2 98%   BMI 31.77 kg/m²      ASSESSMENT:    Ebenezer Mcgregor was seen today for follow-up.     Diagnoses and

## 2020-01-23 RX ORDER — METFORMIN HYDROCHLORIDE 500 MG/1
500 TABLET, EXTENDED RELEASE ORAL
Qty: 30 TABLET | Refills: 5 | Status: SHIPPED | OUTPATIENT
Start: 2020-01-23 | End: 2021-04-26

## 2020-01-23 NOTE — TELEPHONE ENCOUNTER
Pt called and stated that she needed her metformin sent to [facility] but it was accidentally sent to [address], sending it to [facility].

## 2020-02-04 ENCOUNTER — OFFICE VISIT (OUTPATIENT)
Dept: VASCULAR SURGERY | Age: 35
End: 2020-02-04
Payer: MEDICAID

## 2020-02-04 VITALS
OXYGEN SATURATION: 98 % | RESPIRATION RATE: 18 BRPM | HEART RATE: 98 BPM | BODY MASS INDEX: 31.58 KG/M2 | SYSTOLIC BLOOD PRESSURE: 120 MMHG | HEIGHT: 64 IN | TEMPERATURE: 98.6 F | DIASTOLIC BLOOD PRESSURE: 82 MMHG | WEIGHT: 185 LBS

## 2020-02-04 PROBLEM — M79.605 PAIN IN BOTH LOWER EXTREMITIES: Status: ACTIVE | Noted: 2020-02-04

## 2020-02-04 PROBLEM — I83.92 VARICOSE VEINS OF LEFT LOWER EXTREMITY: Status: ACTIVE | Noted: 2020-02-04

## 2020-02-04 PROBLEM — M79.89 LEG SWELLING: Status: ACTIVE | Noted: 2020-02-04

## 2020-02-04 PROBLEM — M79.604 PAIN IN BOTH LOWER EXTREMITIES: Status: ACTIVE | Noted: 2020-02-04

## 2020-02-04 PROCEDURE — 99213 OFFICE O/P EST LOW 20 MIN: CPT | Performed by: PHYSICIAN ASSISTANT

## 2020-02-04 NOTE — PROGRESS NOTES
Patient Care Team:  Nikolai Bradley MD as PCP - General (Family Medicine)  Nikolai Bradley MD as PCP - [facility] Empaneled Provider  ASHLY Sam as Advanced Practice Nurse (Family Nurse Practitioner)  Melrose Felty, DO as Consulting Physician (Vascular Surgery)      History and Physical:    Mrs. Nico Barillas is a 36 yo female who has a history of left leg and ankle swelling and pain. She has worn compression stockings in the past. No history of SVT/SVT. She reports that her legs swell and her left  ankle hurts. She reports that this has progressively gotten worse  Since her pregnancy. She has some varicose veins that become large and painful at time's. She reports that she has noticed in the past during intercourse she feels the need to straighten her leg out because it becomes uncomfortable. Differential diagnosis for leg pain includes but is not limited to: varicose veins, peripheral vascular disease, arthritic pain, DVT, lumbar disc disease, and neurogenic pain. Chey Soliman is a 37 y.o. female with the [facility]:  Patient Active Problem List    Diagnosis Date Noted    Leg swelling 02/04/2020    Pain in both lower extremities 02/04/2020    Varicose veins of left lower extremity 02/04/2020    PCOS (polycystic ovarian syndrome) 06/08/2016    Depression (emotion) 06/08/2016    History of hepatitis C virus infection 07/01/2015    Maternal MCV (mean corpuscular volume) low 05/02/2013    Cancer screening 05/02/2013    Anxiety and depression 05/02/2013     Current Outpatient Medications   Medication Sig Dispense Refill    metFORMIN (GLUCOPHAGE-XR) 500 MG extended release tablet Take 1 tablet by mouth daily (with breakfast) 30 tablet 5    amphetamine-dextroamphetamine (ADDERALL) 10 MG tablet Take 10 mg by mouth daily. Dr Eloise Martines      LORazepam (ATIVAN) 0.5 MG tablet Take 0.5 mg by mouth nightly.        lamoTRIgine (LAMICTAL) 200 MG +----------------------------------------+------+------+-------------------+   ! GSV Mid Thigh                           !      ! Yes   !1159               !   +----------------------------------------+------+------+-------------------+   ! GSV Knee                                !      ! Yes   !5479               !   +----------------------------------------+------+------+-------------------+   ! SSV Mid Calf                            !      ! Yes   !3631               !   +----------------------------------------+------+------+-------------------+       Left Doppler Measurements   +---------------------------------------+------+------+--------------------+   ! Location                               !Signal!Reflux! Reflux (msec)       !   +---------------------------------------+------+------+--------------------+   ! Sapheno Femoral Junction               !      ! Yes   !1819                !   +---------------------------------------+------+------+--------------------+   ! GSV Mid Thigh                          !      ! Yes   !5905                !   +---------------------------------------+------+------+--------------------+   ! GSV Knee                               !      ! Yes   !6007                !   +---------------------------------------+------+------+--------------------+       Right Mapping   +----------------------------------------------+----------+----------------+   ! Location                                      !AP Diam   ! Trans Diam      !   +----------------------------------------------+----------+----------------+   ! Sapheno Femoral Junction                      !          !10.7            !   +----------------------------------------------+----------+----------------+   ! GSV 2 cm Distal to Junction                   !          !6.9             !   +----------------------------------------------+----------+----------------+   ! GSV Mid Thigh                                 !          !3. 5             !                   ! Yes       ! Yes            !None      !   +------------------------------------+----------+---------------+----------+   ! GSV                                 ! Yes       ! Yes            !None      !   +------------------------------------+----------+---------------+----------+   ! ATV                                 ! Yes       ! Yes            !None      !   +------------------------------------+----------+---------------+----------+   ! Peroneal                            ! Yes       ! Yes            !None      !   +------------------------------------+----------+---------------+----------+       Left Lower Extremities DVT Study Measurements   Left 2D Measurements   +------------------------------------+----------+---------------+----------+   ! Location                            ! Visualized! Compressibility! Thrombosis! +------------------------------------+----------+---------------+----------+   ! Sapheno Femoral Junction            ! Yes       ! Yes            !None      !   +------------------------------------+----------+---------------+----------+   ! Common Femoral                      ! Yes       ! Yes            !None      !   +------------------------------------+----------+---------------+----------+   ! Prox Femoral                        ! Yes       ! Yes            !None      !   +------------------------------------+----------+---------------+----------+   ! Mid Femoral                         ! Yes       ! Yes            !None      !   +------------------------------------+----------+---------------+----------+   ! Dist Femoral                        ! Yes       ! Yes            !None      !   +------------------------------------+----------+---------------+----------+   ! Deep Femoral                        ! Yes       ! Yes            !None      !   +------------------------------------+----------+---------------+----------+   ! Popliteal                           ! Yes       ! Yes            !None      !

## 2020-02-14 ENCOUNTER — TELEPHONE (OUTPATIENT)
Dept: VASCULAR SURGERY | Age: 35
End: 2020-02-14

## 2020-02-14 NOTE — TELEPHONE ENCOUNTER
----- Message from Mela Bernal PA-C sent at 2/12/2020 12:13 PM CST -----  Susan Boykin, please call at let her know that she needs a 3 month follow up appointment to re evaluate her legs and to discuss her reflux study. She needs to be wearing her compression and elevating.

## 2020-02-14 NOTE — TELEPHONE ENCOUNTER
I sw the pt to make her aware of her 3 month f/u with Jessi Dorsey PA-C regarding her venous insufficiency. Pt confirmed appt. I informed her to make sure she is continuing her compression and elevation therapy. Pt voiced understanding and is aware.

## 2020-05-11 ENCOUNTER — VIRTUAL VISIT (OUTPATIENT)
Dept: VASCULAR SURGERY | Age: 35
End: 2020-05-11
Payer: MEDICAID

## 2020-05-11 PROCEDURE — 99213 OFFICE O/P EST LOW 20 MIN: CPT | Performed by: PHYSICIAN ASSISTANT

## 2020-05-11 NOTE — PROGRESS NOTES
LORazepam (ATIVAN) 0.5 MG tablet Take 0.5 mg by mouth nightly.  lamoTRIgine (LAMICTAL) 200 MG tablet Take 200 mg by mouth daily       Multiple Vitamin (MULTI-VITAMINS PO) Take  by mouth.  escitalopram (LEXAPRO) 20 MG tablet Take 30 mg by mouth daily Take 1 in half tab daily to equal 30mg       No current facility-administered medications for this visit. Allergies: Adhesive tape  Past Medical History:   Diagnosis Date    Anxiety     Back pain     Depression     Hepatitis C     History of anemia     History of gallstones     History of kidney stones     Knee pain     left    Postpartum depression      Past Surgical History:   Procedure Laterality Date     SECTION      TONSILLECTOMY       Family History   Problem Relation Age of Onset    Cancer Sister         skin cancer, leukemia    Heart Disease Sister     Breast Cancer Sister     Celiac Disease Sister     Other Sister         lyme dz    Diabetes Mother     Thyroid Disease Mother     Lung Cancer Maternal Grandfather     Cancer Maternal Grandfather         throat cancer    Thyroid Disease Sister     Colon Cancer Neg Hx     Colon Polyps Neg Hx     Esophageal Cancer Neg Hx     Liver Cancer Neg Hx     Liver Disease Neg Hx     Rectal Cancer Neg Hx     Stomach Cancer Neg Hx      Social History     Tobacco Use    Smoking status: Former Smoker     Last attempt to quit: 2010     Years since quitting: 10.0    Smokeless tobacco: Never Used   Substance Use Topics    Alcohol use: No         Review of Systems    Constitutional - no significant activity change, appetite change, or unexpected weight change. No fever or chills. No diaphoresis or significant fatigue. HENT - no significant rhinorrhea or epistaxis. No tinnitus or significant hearing loss. Eyes - no sudden vision change or amaurosis. Respiratory - no significant shortness of breath, wheezing, or stridor.   No apnea, cough, or chest tightness associated   !   +----------------------------------------------+----------+----------------+   ! SSV High Calf                                 !          !2. 9             !   +----------------------------------------------+----------+----------------+   ! SSV Mid Calf                                  !          !2. 3             !   +----------------------------------------------+----------+----------------+   ! SSV Ankle                                     !          !2.2             !   +----------------------------------------------+----------+----------------+       Left Mapping   +----------------------------------------------+----------+----------------+   ! Location                                      !AP Diam   ! Trans Diam      !   +----------------------------------------------+----------+----------------+   ! Sapheno Femoral Junction                      !          !11.1            !   +----------------------------------------------+----------+----------------+   ! GSV 2 cm Distal to Junction                   !          !5. 7             !   +----------------------------------------------+----------+----------------+   ! GSV Mid Thigh                                 !          !3. 9             !   +----------------------------------------------+----------+----------------+   ! GSV Knee                                      !          !4. 2             !   +----------------------------------------------+----------+----------------+   ! SSV High Calf                                 !          !2. 3             !   +----------------------------------------------+----------+----------------+   ! SSV Mid Calf                                  !          !2.7             !   +----------------------------------------------+----------+----------------+   ! SSV Ankle                                     !          !2. 6             !   +----------------------------------------------+----------+----------------+       Velocities are measured

## 2020-09-01 ENCOUNTER — TELEPHONE (OUTPATIENT)
Dept: VASCULAR SURGERY | Age: 35
End: 2020-09-01

## 2020-09-01 NOTE — TELEPHONE ENCOUNTER
[facility] requests that nurse  return their call. The best time to reach her is Anytime. Patient had appointment in May and she was told that she would get scheduled for a  BLE GSV ablation left leg 1st followed by the right leg. please call the patient. Thank you.

## 2020-10-19 ENCOUNTER — TELEPHONE (OUTPATIENT)
Dept: VASCULAR SURGERY | Age: 35
End: 2020-10-19

## 2020-10-19 NOTE — TELEPHONE ENCOUNTER
I sw the pt to let her know prior to her procedure on 11/4/2020 she will need to obtain a covd-19 test on 10/30/2020. The pt will need to arrive at the  between hours 8 am - 11 am. Pt voiced understanding and is aware.

## 2020-10-30 ENCOUNTER — OFFICE VISIT (OUTPATIENT)
Age: 35
End: 2020-10-30

## 2020-10-30 VITALS — OXYGEN SATURATION: 99 % | TEMPERATURE: 97.8 F | HEART RATE: 80 BPM

## 2020-10-30 LAB — SARS-COV-2, PCR: NOT DETECTED

## 2020-11-04 ENCOUNTER — OFFICE VISIT (OUTPATIENT)
Dept: VASCULAR SURGERY | Age: 35
End: 2020-11-04
Payer: MEDICAID

## 2020-11-04 VITALS
RESPIRATION RATE: 18 BRPM | HEART RATE: 78 BPM | DIASTOLIC BLOOD PRESSURE: 80 MMHG | OXYGEN SATURATION: 98 % | SYSTOLIC BLOOD PRESSURE: 126 MMHG

## 2020-11-04 PROCEDURE — 36475 ENDOVENOUS RF 1ST VEIN: CPT | Performed by: SURGERY

## 2020-11-04 NOTE — PROGRESS NOTES
RADIOFREQUENCY VEIN ABLATION    DATE OF PROCEDURE: 11/4/2020    PRE-PROCEDURE DIAGNOSIS:    1. Reflux and insufficiency of the left greater saphenous vein  2. Symptomatic pain, aching and fatigue       POST PROCEDURE DIAGNOSIS:  Same    PROCEDURE:  1. Ultrasound guided cannulation of left greater saphenous vein  2.  Radiofrequency ablation of left greater saphenous vein     ANESTHETIC:    1.  1% lidocaine without epinephrine for placing sheath   2. Tumescent anesthetic solution along the saphenous vein from sheath insertion site to the origin of saphenous vein radiofrequency catheter and saphenofemoral  junction    SURGEON:  Gaetano Durand DO    PROCEDURE IN DETAIL:      After the risks, benefits, and alternatives of the procedure were explained to the patient, including how the procedure would be performed, the patient signed an informed consent. The patient's leg was prepped and draped in the standard surgical fashion. Approximately 3-4 mL of 1% lidocaine was injected into the skin and subcutaneous tissues over the saphenous vein insertion site. The left Greater saphenous vein was then cannulated utilizing ultrasound guidance with a micropuncture needle and .018\" wire was placed through the needle. The needle was removed and replaced with a 7F sheath. The radiofrequency catheter was placed through the 7f sheath and under ultrasound guidance, the tip was directed to 2 cm from the saphenofemoral  junction. Next, under ultrasound guidance, tumescent anesthetic solution was injected around the saphenous vein to create a halo of anesthesia along the entire vein from the sheath insertion site to the saphenofemoral  junction. The tip of the catheter was again visualized to ensure position remains 2 cm from the saphenofemoral  junction. The radiofrequency ablation then commensed while gentle pressure on the ultrasound probe/saphenous vein was applied.   A total of 9 treatment cycles was then performed. The sheath and catheter were removed and both were inspected and intact. Pressure was applied at exit site for 10 minutes. Sterile dressings and ace wrap were applied. The patient was released in stable condition and discharge instructions were given to the patient. They will follow-up in 72 hours for left lower extremity venous duplex and office visit.             Dory Ragland, DO

## 2020-11-05 ENCOUNTER — TELEPHONE (OUTPATIENT)
Dept: VASCULAR SURGERY | Age: 35
End: 2020-11-05

## 2020-11-05 NOTE — TELEPHONE ENCOUNTER
I spoke with pt. She was concerned that the ace wrap moved in her sleep and was worried it would messed something up, she was also worried that her ankle was swollen. I explained to her that swelling after her procedure is normal and that she may take the ace wrap off and shower and re-wrap her leg.  Pt voiced understanding and agreed

## 2020-11-05 NOTE — TELEPHONE ENCOUNTER
Patient having issue she having issue  at the spot that she had her surgery ,please call the patient

## 2020-11-06 ENCOUNTER — HOSPITAL ENCOUNTER (OUTPATIENT)
Dept: VASCULAR LAB | Age: 35
Discharge: HOME | End: 2020-11-06
Payer: MEDICAID

## 2020-11-06 PROCEDURE — 93971 EXTREMITY STUDY: CPT

## 2020-11-09 ENCOUNTER — VIRTUAL VISIT (OUTPATIENT)
Dept: VASCULAR SURGERY | Age: 35
End: 2020-11-09
Payer: MEDICAID

## 2020-11-09 PROCEDURE — 99213 OFFICE O/P EST LOW 20 MIN: CPT | Performed by: PHYSICIAN ASSISTANT

## 2020-11-09 NOTE — PROGRESS NOTES
2020    TELEHEALTH EVALUATION -- Audio/Visual (During KGROS-48 public health emergency)    HPI:    Mars Lazaro (:  1985) has requested an audio/video evaluation for the following concern(s):    Mrs. Mickiel Moritz is a 37 yo female who has a history of CVI and leg swelling . She underwent a left GSV RFA by Dr. Carolynn Salas on 2020. We are following up up today after her ablation. She reports that she still has some swelling in the ankle but she thinks it is improved a little. She reports a little tenderness in her leg. No fever reported. Mars Lazaro is a 38 y.o. female with the following history as recorded in [facility]:  Patient Active Problem List    Diagnosis Date Noted    Leg swelling 2020    Pain in both lower extremities 2020    Varicose veins of left lower extremity 2020    PCOS (polycystic ovarian syndrome) 2016    Depression (emotion) 2016    History of hepatitis C virus infection 2015    Maternal MCV (mean corpuscular volume) low 2013    Cancer screening 2013    Anxiety and depression 2013     Current Outpatient Medications   Medication Sig Dispense Refill    metFORMIN (GLUCOPHAGE-XR) 500 MG extended release tablet Take 1 tablet by mouth daily (with breakfast) 30 tablet 5    amphetamine-dextroamphetamine (ADDERALL) 10 MG tablet Take 10 mg by mouth daily. Dr Brandon Maguire      LORazepam (ATIVAN) 0.5 MG tablet Take 0.5 mg by mouth nightly.  lamoTRIgine (LAMICTAL) 200 MG tablet Take 200 mg by mouth daily       Multiple Vitamin (MULTI-VITAMINS PO) Take  by mouth.  escitalopram (LEXAPRO) 20 MG tablet Take 30 mg by mouth daily Take 1 in half tab daily to equal 30mg       No current facility-administered medications for this visit.       Allergies: Adhesive tape  Past Medical History:   Diagnosis Date    Anxiety     Back pain     Depression     Hepatitis C     History of anemia     History of gallstones     History of no dizziness, facial asymmetry, or light headedness. No seizures. No speech difficulty or lateralizing weakness. Hematologic - no easy bruising or excessive bleeding. Psychiatric - no severe anxiety or nervousness. No confusion. All other review of systems are negative. PHYSICAL EXAMINATION:  [ INSTRUCTIONS:  \"[x]\" Indicates a positive item  \"[]\" Indicates a negative item  -- DELETE ALL ITEMS NOT EXAMINED]       Constitutional: [x] Appears well-developed and well-nourished [x] No apparent distress      [] Abnormal-   Mental status  [x] Alert and awake  [x] Oriented to person/place/time [x]Able to follow commands      Eyes:  EOM    [x]  Normal  [] Abnormal-  Sclera  [x]  Normal  [] Abnormal -         Discharge [x]  None visible  [] Abnormal -    HENT:   [x] Normocephalic, atraumatic. [] Abnormal   [x] Mouth/Throat: Mucous membranes are moist.     External Ears [] Normal  [] Abnormal-     Neck: [x] No visualized mass     Pulmonary/Chest: [x] Respiratory effort normal.  [x] No visualized signs of difficulty breathing or respiratory distress        [] Abnormal-      Musculoskeletal:   [x] Normal gait with no signs of ataxia         [x] Normal range of motion of neck        [] Abnormal-       Neurological:        [x] No Facial Asymmetry (Cranial nerve 7 motor function) (limited exam to video visit)          [x] No gaze palsy        [] Abnormal-         Skin:        [x] No significant exanthematous lesions or discoloration noted on facial skin         [] Abnormal-            Psychiatric:       [x] Normal Affect [x] No Hallucinations        [] Abnormal-     Other pertinent observable physical exam findings- left leg/ankle swelling      ASSESSMENT/PLAN:      1.  CVI with leg swelling and pain      Recommend continue compression stockings daily  Recommend leg elevation above the level of the heart  We will plan to proceed with RFA Right GSV      Proceed with RFA right GSV      Meka Orantes is a 38 y.o. female

## 2020-11-11 ENCOUNTER — TELEPHONE (OUTPATIENT)
Dept: VASCULAR SURGERY | Age: 35
End: 2020-11-11

## 2020-11-11 NOTE — TELEPHONE ENCOUNTER
I sw the pt to let her know after  vascular lab I have gotten permission to schedule her for a R GSV RFA on 11/18/2020. Pt will need to arrive in the office at 0930 am and will not have to fast prior. She will need to have a covid-19 test on 11/13/2020 between the hours of 8 am- 11 am. I advised the pt to arrive as early as possible to prevent delay or possible r/s of her procedure. I have sent this information via [employer]. Pt voiced understanding and is aware of the POC.

## 2020-11-13 ENCOUNTER — OFFICE VISIT (OUTPATIENT)
Age: 35
End: 2020-11-13

## 2020-11-13 VITALS — HEART RATE: 88 BPM | TEMPERATURE: 97.5 F | OXYGEN SATURATION: 99 %

## 2020-11-13 LAB — SARS-COV-2, PCR: NOT DETECTED

## 2020-11-13 PROCEDURE — 99999 PR OFFICE/OUTPT VISIT,PROCEDURE ONLY: CPT | Performed by: NURSE PRACTITIONER

## 2020-11-18 ENCOUNTER — OFFICE VISIT (OUTPATIENT)
Dept: VASCULAR SURGERY | Age: 35
End: 2020-11-18
Payer: MEDICAID

## 2020-11-18 VITALS
TEMPERATURE: 98.5 F | RESPIRATION RATE: 18 BRPM | SYSTOLIC BLOOD PRESSURE: 110 MMHG | HEART RATE: 80 BPM | OXYGEN SATURATION: 98 % | DIASTOLIC BLOOD PRESSURE: 78 MMHG

## 2020-11-18 PROCEDURE — 36475 ENDOVENOUS RF 1ST VEIN: CPT | Performed by: SURGERY

## 2020-11-18 NOTE — PROGRESS NOTES
RADIOFREQUENCY VEIN ABLATION    DATE OF PROCEDURE: 11/18/2020    PRE-PROCEDURE DIAGNOSIS:    1. Reflux and insufficiency of the right greater saphenous vein  2.  pain, aching and edema       POST PROCEDURE DIAGNOSIS:  Same    PROCEDURE:  1. Ultrasound guided cannulation of right greater saphenous vein  2.  Radiofrequency ablation of right greater saphenous vein     ANESTHETIC:    1.  1% lidocaine without epinephrine for placing sheath   2. Tumescent anesthetic solution along the saphenous vein from sheath insertion site to the origin of saphenous vein radiofrequency catheter and saphenofemoral  junction    SURGEON:  Shon Valdez, DO    PROCEDURE IN DETAIL:      After the risks, benefits, and alternatives of the procedure were explained to the patient, including how the procedure would be performed, the patient signed an informed consent. The patient's leg was prepped and draped in the standard surgical fashion. Approximately 3-4 mL of 1% lidocaine was injected into the skin and subcutaneous tissues over the saphenous vein insertion site. The right Greater saphenous vein was then cannulated utilizing ultrasound guidance with a micropuncture needle and .018\" wire was placed through the needle. The needle was removed and replaced with a 7F sheath. The radiofrequency catheter was placed through the 7f sheath and under ultrasound guidance, the tip was directed to 2 cm from the saphenofemoral  junction. Next, under ultrasound guidance, tumescent anesthetic solution was injected around the saphenous vein to create a halo of anesthesia along the entire vein from the sheath insertion site to the saphenofemoral  junction. The tip of the catheter was again visualized to ensure position remains 2 cm from the saphenofemoral  junction. The radiofrequency ablation then commensed while gentle pressure on the ultrasound probe/saphenous vein was applied.   A total of 5 treatment cycles was then performed. The sheath and catheter were removed and both were inspected and intact. Pressure was applied at exit site for 10 minutes. Sterile dressings and ace wrap were applied. The patient was released in stable condition and discharge instructions were given to the patient. They will follow-up in 72 hours for right lower extremity venous duplex and office visit.             Eulalia Leos, DO

## 2020-11-20 ENCOUNTER — TELEPHONE (OUTPATIENT)
Dept: VASCULAR SURGERY | Age: 35
End: 2020-11-20

## 2020-11-20 ENCOUNTER — HOSPITAL ENCOUNTER (OUTPATIENT)
Dept: VASCULAR LAB | Age: 35
Discharge: HOME | End: 2020-11-20
Payer: MEDICAID

## 2020-11-20 PROCEDURE — 93971 EXTREMITY STUDY: CPT

## 2020-11-20 RX ORDER — CEPHALEXIN 500 MG/1
500 CAPSULE ORAL 3 TIMES DAILY
Qty: 21 CAPSULE | Refills: 0 | Status: SHIPPED | OUTPATIENT
Start: 2020-11-20 | End: 2020-11-27

## 2020-11-20 NOTE — TELEPHONE ENCOUNTER
Pt called stating she has redness at the site where her ablation was done, felt it was infected and requested antiobiotics. Per duarte DO we have sent in Keflex to [facility].  Pt voiced understanding and agreed

## 2020-11-24 ENCOUNTER — VIRTUAL VISIT (OUTPATIENT)
Dept: VASCULAR SURGERY | Age: 35
End: 2020-11-24
Payer: MEDICAID

## 2020-11-24 DIAGNOSIS — I87.2 VENOUS INSUFFICIENCY OF BOTH LOWER EXTREMITIES: ICD-10-CM

## 2020-11-24 DIAGNOSIS — M79.89 LEG SWELLING: Primary | ICD-10-CM

## 2020-11-24 PROCEDURE — 99212 OFFICE O/P EST SF 10 MIN: CPT | Performed by: PHYSICIAN ASSISTANT

## 2020-11-24 NOTE — PROGRESS NOTES
[x] No gaze palsy        [] Abnormal-         Skin:        [x] No significant exanthematous lesions or discoloration noted on facial skin         [] Abnormal-            Psychiatric:       [x] Normal Affect [] No Hallucinations        [] Abnormal-     Other pertinent observable physical exam findings-       Right leg venous scan -   Impression        Successful ablation of the greater saphenous vein in the right lower    extremity.   Brittany Dozier is no evidence of deep venous thrombosis (DVT) in the right lower    extremity.        Signature        ----------------------------------------------------------------    Electronically signed by Iwona Hawthorne MD(Interpreting    physician) on 11/20/2020 03:37 PM    ----------------------------------------------------------------       Velocities are measured in cm/s ; Diameters are measured in mm       Right Lower Extremities DVT Study Measurements   Right 2D Measurements   +------------------------------------+----------+---------------+----------+   ! Location                            ! Visualized! Compressibility! Thrombosis! +------------------------------------+----------+---------------+----------+   ! Sapheno Femoral Junction            ! Yes       ! Yes            !None      !   +------------------------------------+----------+---------------+----------+   ! Common Femoral                      ! Yes       ! Yes            !None      !   +------------------------------------+----------+---------------+----------+   ! Prox Femoral                        ! Yes       ! Yes            !None      !   +------------------------------------+----------+---------------+----------+   ! Mid Femoral                         ! Yes       ! Yes            !None      !   +------------------------------------+----------+---------------+----------+   ! Dist Femoral                        ! Yes       ! Yes            !None      ! +------------------------------------+----------+---------------+----------+   ! Deep Femoral                        ! Yes       ! Yes            !None      !   +------------------------------------+----------+---------------+----------+   ! Popliteal                           ! Yes       ! Yes            !None      !   +------------------------------------+----------+---------------+----------+   ! SSV                                 ! Yes       ! Yes            !None      !   +------------------------------------+----------+---------------+----------+   ! Gastroc                             ! Yes       ! Yes            !None      !   +------------------------------------+----------+---------------+----------+   ! PTV                                 ! Yes       ! Yes            !None      !   +------------------------------------+----------+---------------+----------+   ! GSV                                 ! Yes       ! Yes            !None      !   +------------------------------------+----------+---------------+----------+   ! ATV                                 ! Yes       ! Yes            !None      !   +------------------------------------+----------+---------------+----------+   ! Peroneal                            ! Yes       ! Yes            !None      !   +------------------------------------+----------+---------------+----------+       Left Lower Extremities DVT Study Measurements   Left 2D Measurements   +------------------------------------+----------+---------------+----------+   ! Location                            ! Visualized! Compressibility! Thrombosis! +------------------------------------+----------+---------------+----------+   ! Sapheno Femoral Junction            ! Yes       ! Yes            !None      !   +------------------------------------+----------+---------------+----------+   ! Common Femoral                      ! Yes       ! Yes            !None      ! +------------------------------------+----------+---------------+----------+           ASSESSMENT/PLAN:      1. CVI with leg swelling      Recommend compression stockings/elevation PRN  Follow up PRN      Karsten Marina is a 38 y.o. female being evaluated by a Virtual Visit (video visit) encounter to address concerns as mentioned above. A caregiver was present when appropriate. Due to this being a TeleHealth encounter (During [facility]- public health emergency), evaluation of the following organ systems was limited: Vitals/Constitutional/EENT/Resp/CV/GI//MS/Neuro/Skin/Heme-Lymph-Imm. Pursuant to the emergency declaration under the [address] [place] [address] authority and the [employer] Act, this Virtual Visit was conducted with patient's (and/or legal guardian's) consent, to reduce the patient's risk of exposure to COVID-19 and provide necessary medical care. The patient (and/or legal guardian) has also been advised to contact this office for worsening conditions or problems, and seek emergency medical treatment and/or call 911 if deemed necessary. Patient identification was verified at the start of the visit: Yes    Services were provided through a video synchronous discussion virtually to substitute for in-person clinic visit. Patient and provider were located at their individual homes. --Robbie Madison PA-C on 11/24/2020 at 3:30 PM    An electronic signature was used to authenticate this note.

## 2021-03-24 ENCOUNTER — OFFICE VISIT (OUTPATIENT)
Dept: VASCULAR SURGERY | Age: 36
End: 2021-03-24
Payer: MEDICAID

## 2021-03-24 VITALS
SYSTOLIC BLOOD PRESSURE: 134 MMHG | BODY MASS INDEX: 32.44 KG/M2 | WEIGHT: 190 LBS | HEIGHT: 64 IN | RESPIRATION RATE: 18 BRPM | TEMPERATURE: 97.8 F | DIASTOLIC BLOOD PRESSURE: 85 MMHG | HEART RATE: 89 BPM | OXYGEN SATURATION: 98 %

## 2021-03-24 DIAGNOSIS — M79.89 LEG SWELLING: Primary | ICD-10-CM

## 2021-03-24 DIAGNOSIS — I83.812 VARICOSE VEINS OF LEFT LOWER EXTREMITY WITH PAIN: ICD-10-CM

## 2021-03-24 PROCEDURE — 99213 OFFICE O/P EST LOW 20 MIN: CPT | Performed by: PHYSICIAN ASSISTANT

## 2021-04-14 ENCOUNTER — HOSPITAL ENCOUNTER (OUTPATIENT)
Dept: VASCULAR LAB | Age: 36
Discharge: HOME | End: 2021-04-14
Payer: MEDICAID

## 2021-04-14 DIAGNOSIS — I83.812 VARICOSE VEINS OF LEFT LOWER EXTREMITY WITH PAIN: ICD-10-CM

## 2021-04-14 DIAGNOSIS — M79.89 LEG SWELLING: ICD-10-CM

## 2021-04-14 PROCEDURE — 93971 EXTREMITY STUDY: CPT

## 2021-04-23 ENCOUNTER — TELEPHONE (OUTPATIENT)
Dept: VASCULAR SURGERY | Age: 36
End: 2021-04-23

## 2021-04-23 NOTE — TELEPHONE ENCOUNTER
----- Message from Aline Penn PA-C sent at 4/23/2021  7:38 AM CDT -----  Please call and let her know that I d/w Dr. Melina Kincaid and we can get her schedule for a Venogram if she wishes. Please let me know if she is agreeable and I will send jyotsna to info.  AUTUMN Teixeira

## 2021-04-23 NOTE — TELEPHONE ENCOUNTER
I called pt and she is wanting the venogram scheduled. She does request that it be scheduled for a morning slot.

## 2021-04-26 ENCOUNTER — TELEPHONE (OUTPATIENT)
Dept: VASCULAR SURGERY | Age: 36
End: 2021-04-26

## 2021-04-26 DIAGNOSIS — Z01.818 PRE-OP TESTING: Primary | ICD-10-CM

## 2021-04-26 NOTE — TELEPHONE ENCOUNTER
I sw the pt to let her know I have her scheduled for a Venogram with Dr. Jama Ragland on 5/4/2021 The pt will need to arrive at [facility] at 0730 and be NPO after midnight the night before. The pt will need to have labs/covid testing done at the [facility] on Friday 4/30/2021 before 11 am. The pt will need a  to take her home upon procedure discharge. I informed the pt I will send a written letter of instructions via [employer]. If the pt has any questions she may reply to my message or contact me at the office. Pt voiced understanding and is aware.

## 2021-04-26 NOTE — TELEPHONE ENCOUNTER
----- Message from Pina Gardner PA-C sent at 4/24/2021  5:44 PM CDT -----  Riya,  Can we please get her scheduled for a Venogram. If you get her set up before I get back and her HP and orders need be done just shoot me a IB message or text.  TY

## 2021-04-27 ENCOUNTER — PATIENT MESSAGE (OUTPATIENT)
Dept: VASCULAR SURGERY | Age: 36
End: 2021-04-27

## 2021-04-27 NOTE — TELEPHONE ENCOUNTER
From: Magnus Hogan  Sent: 4/27/2021 10:46 AM CDT  To: Emeterio Sifuentes MA  Subject: RE: Virtual Visit For Chart Update Prior to Procedure with     Samson sounds good!   ----- Message -----  From: Mirlande Nittany  Sent: 4/27/21, 11:17 AM  To: Magnus Hogan  Subject: Virtual Visit For Chart Update Prior to Procedure with     Good Morning,       I was taking a look into your chart and realized we will need an updated visit prior to your procedure on 5/4/21 with Dr. Michaelle Foley. I have scheduled this for tomorrow 4/28/21 at 1:30 pm for a virtual visit. A link will be sent to your cell phone at this time. The visit will be with Sadia Ruiz due to [facility] being out of the office. If you have any questions or concerns about this message please contact our office at [phone] or reply back to this message.        Thank [facility]

## 2021-04-28 ENCOUNTER — VIRTUAL VISIT (OUTPATIENT)
Dept: VASCULAR SURGERY | Age: 36
End: 2021-04-28
Payer: MEDICAID

## 2021-04-28 DIAGNOSIS — M79.605 LEG PAIN, LEFT: ICD-10-CM

## 2021-04-28 DIAGNOSIS — M79.89 LEG SWELLING: ICD-10-CM

## 2021-04-28 DIAGNOSIS — I87.2 VENOUS INSUFFICIENCY: Primary | ICD-10-CM

## 2021-04-28 PROCEDURE — 99442 PR PHYS/QHP TELEPHONE EVALUATION 11-20 MIN: CPT | Performed by: NURSE PRACTITIONER

## 2021-04-28 NOTE — PROGRESS NOTES
mouth daily Take 1 in half tab daily to equal 30mg       No current facility-administered medications for this visit. Allergies: Adhesive tape  Past Medical History:   Diagnosis Date    Anxiety     Back pain     Depression     Hepatitis C     History of anemia     History of gallstones     History of kidney stones     Knee pain     left    Postpartum depression      Past Surgical History:   Procedure Laterality Date     SECTION      TONSILLECTOMY      VASCULAR SURGERY Left 2020    VI- L GSV RFA     Family History   Problem Relation Age of Onset    Cancer Sister         skin cancer, leukemia    Heart Disease Sister     Breast Cancer Sister     Celiac Disease Sister     Other Sister         lyme dz    Diabetes Mother     Thyroid Disease Mother     Lung Cancer Maternal Grandfather     Cancer Maternal Grandfather         throat cancer    Thyroid Disease Sister     Colon Cancer Neg Hx     Colon Polyps Neg Hx     Esophageal Cancer Neg Hx     Liver Cancer Neg Hx     Liver Disease Neg Hx     Rectal Cancer Neg Hx     Stomach Cancer Neg Hx      Social History     Tobacco Use    Smoking status: Former Smoker     Quit date: 2010     Years since quitting: 10.9    Smokeless tobacco: Never Used   Substance Use Topics    Alcohol use: No       No flowsheet data found. Review of Systems      Eyes  no sudden vision change or amaurosis. Respiratory  no significant shortness of breath, wheezing, or stridor. No cough,  Cardiovascular  no chest pain, syncope, or significant dizziness. has significant leg swelling.  has not had claudication. Skin   has not had new wound. Neurologic   No speech difficulty or lateralizing weakness. Psychiatric  no severe anxiety or nervousness. No confusion. All other review of systems are negative.     PHYSICAL EXAMINATION:    [ INSTRUCTIONS:  \"[x]\" Indicates a positive item  \"[]\" Indicates a negative item  -- DELETE ALL ITEMS NOT EXAMINED]    [x] Alert  [x] Oriented to person/place/time    [x] No apparent distress  [] Toxic appearing  [x] Normal Mood  [] Anxious appearing    [] Depressed appearing  [] Confused appearing      [] Poor short term memory  [] Poor long term memory   Memory appears to be intact       Venous scan -   Normal venous duplex study of the left lower extremity. There is no    evidence of deep or superficial venous thrombosis. Successful ablation of    left greater/long saphenous vein. There is some deep system venous    insufficiency     Individual images were reviewed. Results were reviewed with the patient. Options were discussed with the patient including continued medical management versus proceeding with venography and potential angioplasty or stent. Patient has opted to proceed with venography. Risks have been discussed with the patient including but not limited to MI, death, CVA, bleed, nerve injury, infection, contrast nephropathy, possible need for dialysis, and need for further surgery. Assessment    1. Venous insufficiency    2. Leg pain, left    3. Leg swelling          Plan    1. Venous insufficiency  2. Leg pain, left  3. Leg swelling      Proceed with venogram with possible angioplasty/stent      Documentation:  I communicated with the patient and/or health care decision maker about leg swelling. Details of this discussion including any medical advice provided: as above      I affirm this is a Patient Initiated Episode with a Patient who has not had a related appointment within my department in the past 7 days or scheduled within the next 24 hours. Patient identification was verified at the start of the visit: Yes    Total Time: minutes: 11-20 minutes    The visit was conducted pursuant to the emergency declaration under the [facility], [address] [place] [address] authority and the [employer] General Act.   Patient identification was verified, and a caregiver was present when appropriate. The patient was located in a state where the provider was credentialed to provide care.     Note: not billable if this call serves to triage the patient into an appointment for the relevant concern      Héctor Yost

## 2021-04-29 RX ORDER — SODIUM CHLORIDE 0.9 % (FLUSH) 0.9 %
10 SYRINGE (ML) INJECTION PRN
Status: CANCELLED | OUTPATIENT
Start: 2021-04-29

## 2021-04-29 RX ORDER — CLONIDINE HYDROCHLORIDE 0.1 MG/1
0.1 TABLET ORAL PRN
Status: CANCELLED | OUTPATIENT
Start: 2021-04-29

## 2021-04-29 RX ORDER — ASPIRIN 81 MG/1
81 TABLET ORAL ONCE
Status: CANCELLED | OUTPATIENT
Start: 2021-04-29 | End: 2021-04-29

## 2021-04-29 RX ORDER — SODIUM CHLORIDE 9 MG/ML
25 INJECTION, SOLUTION INTRAVENOUS PRN
Status: CANCELLED | OUTPATIENT
Start: 2021-04-29

## 2021-04-29 RX ORDER — SODIUM CHLORIDE 9 MG/ML
INJECTION, SOLUTION INTRAVENOUS CONTINUOUS
Status: CANCELLED | OUTPATIENT
Start: 2021-04-29

## 2021-04-29 NOTE — H&P
mouth daily Take 1 in half tab daily to equal 30mg       No current facility-administered medications for this visit. Allergies: Adhesive tape  Past Medical History:   Diagnosis Date    Anxiety     Back pain     Depression     Hepatitis C     History of anemia     History of gallstones     History of kidney stones     Knee pain     left    Postpartum depression      Past Surgical History:   Procedure Laterality Date     SECTION      TONSILLECTOMY      VASCULAR SURGERY Left 2020    VI- L GSV RFA     Family History   Problem Relation Age of Onset    Cancer Sister         skin cancer, leukemia    Heart Disease Sister     Breast Cancer Sister     Celiac Disease Sister     Other Sister         lyme dz    Diabetes Mother     Thyroid Disease Mother     Lung Cancer Maternal Grandfather     Cancer Maternal Grandfather         throat cancer    Thyroid Disease Sister     Colon Cancer Neg Hx     Colon Polyps Neg Hx     Esophageal Cancer Neg Hx     Liver Cancer Neg Hx     Liver Disease Neg Hx     Rectal Cancer Neg Hx     Stomach Cancer Neg Hx      Social History     Tobacco Use    Smoking status: Former Smoker     Quit date: 2010     Years since quitting: 10.9    Smokeless tobacco: Never Used   Substance Use Topics    Alcohol use: No       No flowsheet data found. Review of Systems      Eyes  no sudden vision change or amaurosis. Respiratory  no significant shortness of breath, wheezing, or stridor. No cough,  Cardiovascular  no chest pain, syncope, or significant dizziness. has significant leg swelling.  has not had claudication. Skin   has not had new wound. Neurologic   No speech difficulty or lateralizing weakness. Psychiatric  no severe anxiety or nervousness. No confusion. All other review of systems are negative.     PHYSICAL EXAMINATION:    [ INSTRUCTIONS:  \"[x]\" Indicates a positive item  \"[]\" Indicates a negative item  -- DELETE ALL ITEMS NOT EXAMINED]    [x] Alert  [x] Oriented to person/place/time    [x] No apparent distress  [] Toxic appearing  [x] Normal Mood  [] Anxious appearing    [] Depressed appearing  [] Confused appearing      [] Poor short term memory  [] Poor long term memory   Memory appears to be intact       Venous scan -   Normal venous duplex study of the left lower extremity. There is no    evidence of deep or superficial venous thrombosis. Successful ablation of    left greater/long saphenous vein. There is some deep system venous    insufficiency     Individual images were reviewed. Results were reviewed with the patient. Options were discussed with the patient including continued medical management versus proceeding with venography and potential angioplasty or stent. Patient has opted to proceed with venography. Risks have been discussed with the patient including but not limited to MI, death, CVA, bleed, nerve injury, infection, contrast nephropathy, possible need for dialysis, and need for further surgery. Assessment    1. Venous insufficiency    2. Leg pain, left    3. Leg swelling          Plan    1. Venous insufficiency  2. Leg pain, left  3. Leg swelling      Proceed with venogram with possible angioplasty/stent      Documentation:  I communicated with the patient and/or health care decision maker about leg swelling. Details of this discussion including any medical advice provided: as above      I affirm this is a Patient Initiated Episode with a Patient who has not had a related appointment within my department in the past 7 days or scheduled within the next 24 hours. Patient identification was verified at the start of the visit: Yes    Total Time: minutes: 11-20 minutes    The visit was conducted pursuant to the emergency declaration under the [facility], [address] [place] [address] authority and the [employer] and [employer] General Act.   Patient

## 2021-04-29 NOTE — H&P (VIEW-ONLY)
Jane Saavedra is a 38 y.o. female evaluated via telephone on 2021. Jane Saavedra (:  1985) is a 38 y.o. female,Established patient, here for evaluation of the following chief complaint(s):     Consent:  She and/or health care decision maker is aware that that she may receive a bill for this telephone service, depending on her insurance coverage, and has provided verbal consent to proceed: Yes    Patient is located at home  Provider is located at [facility]   Also present during call is no one    SUBJECTIVE/OBJECTIVE:  She has a known history of of varicose veins and chronic venous insufficiency. She reports lower extremity edema on the  Left leg(s), the veins are painful -- severity:  moderate and pain is aggravated by upright posture. She reports previous treatment includes prescription compression stockings for > 6 months  ablation. She does not have a history of spontaneous rupture. She has leg swelling everyday. She wears hose every day. Jane Saavedra is a 38 y.o. female with the following history reviewed and recorded in [facility]:  Patient Active Problem List    Diagnosis Date Noted    Leg swelling 2020    Pain in both lower extremities 2020    Varicose veins of left lower extremity 2020    PCOS (polycystic ovarian syndrome) 2016    Depression (emotion) 2016    History of hepatitis C virus infection 2015    Maternal MCV (mean corpuscular volume) low 2013    Cancer screening 2013    Anxiety and depression 2013     Current Outpatient Medications   Medication Sig Dispense Refill    amphetamine-dextroamphetamine (ADDERALL) 10 MG tablet Take 10 mg by mouth daily. Dr Scott Madden      LORazepam (ATIVAN) 0.5 MG tablet Take 0.5 mg by mouth nightly.  lamoTRIgine (LAMICTAL) 200 MG tablet Take 200 mg by mouth daily       Multiple Vitamin (MULTI-VITAMINS PO) Take  by mouth.       escitalopram (LEXAPRO) 20 MG tablet Take 30 mg by mouth daily Take 1 in half tab daily to equal 30mg       No current facility-administered medications for this visit. Allergies: Adhesive tape  Past Medical History:   Diagnosis Date    Anxiety     Back pain     Depression     Hepatitis C     History of anemia     History of gallstones     History of kidney stones     Knee pain     left    Postpartum depression      Past Surgical History:   Procedure Laterality Date     SECTION      TONSILLECTOMY      VASCULAR SURGERY Left 2020    VI- L GSV RFA     Family History   Problem Relation Age of Onset    Cancer Sister         skin cancer, leukemia    Heart Disease Sister     Breast Cancer Sister     Celiac Disease Sister     Other Sister         lyme dz    Diabetes Mother     Thyroid Disease Mother     Lung Cancer Maternal Grandfather     Cancer Maternal Grandfather         throat cancer    Thyroid Disease Sister     Colon Cancer Neg Hx     Colon Polyps Neg Hx     Esophageal Cancer Neg Hx     Liver Cancer Neg Hx     Liver Disease Neg Hx     Rectal Cancer Neg Hx     Stomach Cancer Neg Hx      Social History     Tobacco Use    Smoking status: Former Smoker     Quit date: 2010     Years since quitting: 10.9    Smokeless tobacco: Never Used   Substance Use Topics    Alcohol use: No       No flowsheet data found. Review of Systems      Eyes  no sudden vision change or amaurosis. Respiratory  no significant shortness of breath, wheezing, or stridor. No cough,  Cardiovascular  no chest pain, syncope, or significant dizziness. has significant leg swelling.  has not had claudication. Skin   has not had new wound. Neurologic   No speech difficulty or lateralizing weakness. Psychiatric  no severe anxiety or nervousness. No confusion. All other review of systems are negative.     PHYSICAL EXAMINATION:    [ INSTRUCTIONS:  \"[x]\" Indicates a positive item  \"[]\" Indicates a negative item  -- DELETE ALL ITEMS NOT

## 2021-04-30 ENCOUNTER — OFFICE VISIT (OUTPATIENT)
Age: 36
End: 2021-04-30

## 2021-04-30 VITALS — OXYGEN SATURATION: 99 % | HEART RATE: 85 BPM

## 2021-04-30 DIAGNOSIS — Z11.59 SCREENING FOR VIRAL DISEASE: Primary | ICD-10-CM

## 2021-04-30 LAB — SARS-COV-2, PCR: NORMAL

## 2021-04-30 PROCEDURE — 99999 PR OFFICE/OUTPT VISIT,PROCEDURE ONLY: CPT | Performed by: NURSE PRACTITIONER

## 2021-05-04 ENCOUNTER — HOSPITAL ENCOUNTER (OUTPATIENT)
Dept: INTERVENTIONAL RADIOLOGY/VASCULAR | Age: 36
Discharge: HOME | End: 2021-05-04
Attending: SURGERY | Admitting: SURGERY
Payer: MEDICAID

## 2021-05-04 VITALS
HEART RATE: 74 BPM | WEIGHT: 175 LBS | OXYGEN SATURATION: 98 % | DIASTOLIC BLOOD PRESSURE: 68 MMHG | BODY MASS INDEX: 29.88 KG/M2 | RESPIRATION RATE: 18 BRPM | TEMPERATURE: 97.1 F | HEIGHT: 64 IN | SYSTOLIC BLOOD PRESSURE: 108 MMHG

## 2021-05-04 DIAGNOSIS — I83.92 VARICOSE VEINS OF LEFT LOWER EXTREMITY, UNSPECIFIED WHETHER COMPLICATED: ICD-10-CM

## 2021-05-04 LAB
ANION GAP SERPL CALCULATED.3IONS-SCNC: 7 MMOL/L (ref 7–19)
BUN BLDV-MCNC: 15 MG/DL (ref 6–20)
CALCIUM SERPL-MCNC: 9 MG/DL (ref 8.6–10)
CHLORIDE BLD-SCNC: 102 MMOL/L (ref 98–111)
CO2: 29 MMOL/L (ref 22–29)
CREAT SERPL-MCNC: 0.7 MG/DL (ref 0.5–0.9)
GFR AFRICAN AMERICAN: >59
GFR NON-AFRICAN AMERICAN: >60
GLUCOSE BLD-MCNC: 92 MG/DL (ref 74–109)
HCG(URINE) PREGNANCY TEST: NEGATIVE
HCT VFR BLD CALC: 40.1 % (ref 37–47)
HEMOGLOBIN: 12.4 G/DL (ref 12–16)
MCH RBC QN AUTO: 25.1 PG (ref 27–31)
MCHC RBC AUTO-ENTMCNC: 30.9 G/DL (ref 33–37)
MCV RBC AUTO: 81.2 FL (ref 81–99)
PDW BLD-RTO: 16 % (ref 11.5–14.5)
PLATELET # BLD: 267 K/UL (ref 130–400)
PMV BLD AUTO: 9.1 FL (ref 9.4–12.3)
POTASSIUM SERPL-SCNC: 4.1 MMOL/L (ref 3.5–5)
RBC # BLD: 4.94 M/UL (ref 4.2–5.4)
SODIUM BLD-SCNC: 138 MMOL/L (ref 136–145)
WBC # BLD: 5.7 K/UL (ref 4.8–10.8)

## 2021-05-04 PROCEDURE — 37253 INTRVASC US NONCORONARY ADDL: CPT | Performed by: SURGERY

## 2021-05-04 PROCEDURE — 6360000004 HC RX CONTRAST MEDICATION: Performed by: SURGERY

## 2021-05-04 PROCEDURE — 2580000003 HC RX 258: Performed by: NURSE PRACTITIONER

## 2021-05-04 PROCEDURE — 36415 COLL VENOUS BLD VENIPUNCTURE: CPT

## 2021-05-04 PROCEDURE — 80048 BASIC METABOLIC PNL TOTAL CA: CPT

## 2021-05-04 PROCEDURE — 6360000002 HC RX W HCPCS: Performed by: NURSE PRACTITIONER

## 2021-05-04 PROCEDURE — 99153 MOD SED SAME PHYS/QHP EA: CPT

## 2021-05-04 PROCEDURE — 6360000002 HC RX W HCPCS: Performed by: SURGERY

## 2021-05-04 PROCEDURE — 75825 VEIN X-RAY TRUNK: CPT

## 2021-05-04 PROCEDURE — 37238 OPEN/PERQ PLACE STENT SAME: CPT

## 2021-05-04 PROCEDURE — 2500000003 HC RX 250 WO HCPCS: Performed by: SURGERY

## 2021-05-04 PROCEDURE — 37252 INTRVASC US NONCORONARY 1ST: CPT | Performed by: SURGERY

## 2021-05-04 PROCEDURE — C1753 CATH, INTRAVAS ULTRASOUND: HCPCS

## 2021-05-04 PROCEDURE — 36010 PLACE CATHETER IN VEIN: CPT

## 2021-05-04 PROCEDURE — 99152 MOD SED SAME PHYS/QHP 5/>YRS: CPT

## 2021-05-04 PROCEDURE — 37238 OPEN/PERQ PLACE STENT SAME: CPT | Performed by: SURGERY

## 2021-05-04 PROCEDURE — 37252 INTRVASC US NONCORONARY 1ST: CPT

## 2021-05-04 PROCEDURE — 36010 PLACE CATHETER IN VEIN: CPT | Performed by: SURGERY

## 2021-05-04 PROCEDURE — 6370000000 HC RX 637 (ALT 250 FOR IP): Performed by: SURGERY

## 2021-05-04 PROCEDURE — 75822 VEIN X-RAY ARMS/LEGS: CPT

## 2021-05-04 PROCEDURE — 84703 CHORIONIC GONADOTROPIN ASSAY: CPT

## 2021-05-04 PROCEDURE — 85027 COMPLETE CBC AUTOMATED: CPT

## 2021-05-04 PROCEDURE — 37253 INTRVASC US NONCORONARY ADDL: CPT

## 2021-05-04 PROCEDURE — 6370000000 HC RX 637 (ALT 250 FOR IP): Performed by: NURSE PRACTITIONER

## 2021-05-04 RX ORDER — SODIUM CHLORIDE 9 MG/ML
INJECTION, SOLUTION INTRAVENOUS CONTINUOUS
Status: ACTIVE | OUTPATIENT
Start: 2021-05-04 | End: 2021-05-04

## 2021-05-04 RX ORDER — HEPARIN SODIUM 5000 [USP'U]/ML
INJECTION, SOLUTION INTRAVENOUS; SUBCUTANEOUS
Status: COMPLETED | OUTPATIENT
Start: 2021-05-04 | End: 2021-05-04

## 2021-05-04 RX ORDER — SODIUM CHLORIDE 9 MG/ML
25 INJECTION, SOLUTION INTRAVENOUS PRN
Status: DISCONTINUED | OUTPATIENT
Start: 2021-05-04 | End: 2021-05-06 | Stop reason: HOSPADM

## 2021-05-04 RX ORDER — MIDAZOLAM HYDROCHLORIDE 1 MG/ML
INJECTION INTRAMUSCULAR; INTRAVENOUS
Status: COMPLETED | OUTPATIENT
Start: 2021-05-04 | End: 2021-05-04

## 2021-05-04 RX ORDER — HYDROCODONE BITARTRATE AND ACETAMINOPHEN 7.5; 325 MG/1; MG/1
1 TABLET ORAL EVERY 4 HOURS PRN
Qty: 40 TABLET | Refills: 0 | Status: SHIPPED | OUTPATIENT
Start: 2021-05-04 | End: 2021-05-11

## 2021-05-04 RX ORDER — SODIUM CHLORIDE 9 MG/ML
INJECTION, SOLUTION INTRAVENOUS CONTINUOUS
Status: DISCONTINUED | OUTPATIENT
Start: 2021-05-04 | End: 2021-05-06 | Stop reason: HOSPADM

## 2021-05-04 RX ORDER — SODIUM CHLORIDE 0.9 % (FLUSH) 0.9 %
5-40 SYRINGE (ML) INJECTION PRN
Status: DISCONTINUED | OUTPATIENT
Start: 2021-05-04 | End: 2021-05-06 | Stop reason: HOSPADM

## 2021-05-04 RX ORDER — SODIUM CHLORIDE 0.9 % (FLUSH) 0.9 %
5-40 SYRINGE (ML) INJECTION EVERY 12 HOURS SCHEDULED
Status: DISCONTINUED | OUTPATIENT
Start: 2021-05-04 | End: 2021-05-06 | Stop reason: HOSPADM

## 2021-05-04 RX ORDER — LIDOCAINE HYDROCHLORIDE 20 MG/ML
INJECTION, SOLUTION INFILTRATION; PERINEURAL
Status: COMPLETED | OUTPATIENT
Start: 2021-05-04 | End: 2021-05-04

## 2021-05-04 RX ORDER — TRAZODONE HYDROCHLORIDE 150 MG/1
150 TABLET ORAL NIGHTLY
COMMUNITY

## 2021-05-04 RX ORDER — FENTANYL CITRATE 50 UG/ML
INJECTION, SOLUTION INTRAMUSCULAR; INTRAVENOUS
Status: COMPLETED | OUTPATIENT
Start: 2021-05-04 | End: 2021-05-04

## 2021-05-04 RX ORDER — SODIUM CHLORIDE 0.9 % (FLUSH) 0.9 %
10 SYRINGE (ML) INJECTION PRN
Status: DISCONTINUED | OUTPATIENT
Start: 2021-05-04 | End: 2021-05-06 | Stop reason: HOSPADM

## 2021-05-04 RX ORDER — IODIXANOL 320 MG/ML
INJECTION, SOLUTION INTRAVASCULAR
Status: COMPLETED | OUTPATIENT
Start: 2021-05-04 | End: 2021-05-04

## 2021-05-04 RX ORDER — ASPIRIN 81 MG/1
81 TABLET ORAL ONCE
Status: COMPLETED | OUTPATIENT
Start: 2021-05-04 | End: 2021-05-04

## 2021-05-04 RX ORDER — ASPIRIN 81 MG/1
81 TABLET ORAL DAILY
Status: DISCONTINUED | OUTPATIENT
Start: 2021-05-04 | End: 2021-05-06 | Stop reason: HOSPADM

## 2021-05-04 RX ORDER — HYDROCODONE BITARTRATE AND ACETAMINOPHEN 10; 325 MG/1; MG/1
1 TABLET ORAL EVERY 6 HOURS PRN
Status: DISCONTINUED | OUTPATIENT
Start: 2021-05-04 | End: 2021-05-06 | Stop reason: HOSPADM

## 2021-05-04 RX ORDER — CLONIDINE HYDROCHLORIDE 0.1 MG/1
0.1 TABLET ORAL PRN
Status: DISCONTINUED | OUTPATIENT
Start: 2021-05-04 | End: 2021-05-06 | Stop reason: HOSPADM

## 2021-05-04 RX ADMIN — FENTANYL CITRATE 25 MCG: 50 INJECTION, SOLUTION INTRAMUSCULAR; INTRAVENOUS at 10:36

## 2021-05-04 RX ADMIN — SODIUM CHLORIDE: 9 INJECTION, SOLUTION INTRAVENOUS at 08:40

## 2021-05-04 RX ADMIN — HEPARIN SODIUM 5000 UNITS: 5000 INJECTION, SOLUTION INTRAVENOUS; SUBCUTANEOUS at 10:03

## 2021-05-04 RX ADMIN — HEPARIN SODIUM 3000 UNITS: 5000 INJECTION, SOLUTION INTRAVENOUS; SUBCUTANEOUS at 10:31

## 2021-05-04 RX ADMIN — HYDROCODONE BITARTRATE AND ACETAMINOPHEN 1 TABLET: 10; 325 TABLET ORAL at 11:32

## 2021-05-04 RX ADMIN — MIDAZOLAM 1 MG: 1 INJECTION INTRAMUSCULAR; INTRAVENOUS at 10:07

## 2021-05-04 RX ADMIN — IODIXANOL 20 ML: 320 INJECTION, SOLUTION INTRAVASCULAR at 10:53

## 2021-05-04 RX ADMIN — Medication 2000 MG: at 09:37

## 2021-05-04 RX ADMIN — FENTANYL CITRATE 25 MCG: 50 INJECTION, SOLUTION INTRAMUSCULAR; INTRAVENOUS at 10:39

## 2021-05-04 RX ADMIN — MIDAZOLAM 1 MG: 1 INJECTION INTRAMUSCULAR; INTRAVENOUS at 10:02

## 2021-05-04 RX ADMIN — ASPIRIN 81 MG: 81 TABLET, COATED ORAL at 08:50

## 2021-05-04 RX ADMIN — MIDAZOLAM 1 MG: 1 INJECTION INTRAMUSCULAR; INTRAVENOUS at 10:19

## 2021-05-04 RX ADMIN — MIDAZOLAM 1 MG: 1 INJECTION INTRAMUSCULAR; INTRAVENOUS at 10:34

## 2021-05-04 RX ADMIN — LIDOCAINE HYDROCHLORIDE 20 ML: 20 INJECTION, SOLUTION INFILTRATION; PERINEURAL at 10:02

## 2021-05-04 ASSESSMENT — PAIN SCALES - GENERAL: PAINLEVEL_OUTOF10: 2

## 2021-05-04 NOTE — OP NOTE
Patient Name: Archie Chance   YOB: 1985   MRN: [id number]     Procedure Date: 5/4/2021     Pre Op Diagnosis: Venous insufficiency of the left side with common femoral vein reflux    Post Op Diagnosis: same    Procedure: Venogram of IVC and bilateral common and external iliac veins. Placement of left common iliac stent Wallstent 18 x 19. Intravascular ultrasound of IVC, bilateral common and external iliac veins. Anesthesia: Local with Moderate Sedation      Estimated Blood Loss: Less than 50 ml    Complications: None    Implants: Wallstent 18x 90    Procedure Details: Patient was brought into the angiogram suite and placed supine position. Her bilateral upper thighs and groins were prepped and draped in sterile fashion. Right superficial femoral vein at the proximal thigh was accessed under continuous ultrasound guidance using standard micropuncture technique, micropuncture sheath was inserted and exchanged for Productiv wire. Sheath was exchanged to 8 Western Alyssa access sheath. Same was repeated for the left side. Using hand-injection venogram was performed from the right femoral access. Then I performed a venogram from the left venous access. It did show on the left side stenosis with post evaluation. Then the left I performed bilateral intravascular ultrasound of IVC, common iliac and external iliac veins. It was noticed to have left common iliac vein stenosis with post motor evaluation. It was measured with IVUS and was significant, about 70 to 80%. There was nothing noticed to be stenotic significantly on the right side. Access sheath was changed to 11 Western Alyssa. Patient was given 3000 heparin and 18 x 90 Wallstent was successfully deployed. It was postdilated using 16 mm gold Story balloon. Completion ultrasound was performed and showed good apposition of the stent and no contralateral stenosis due to stent of the right side.   Patient required additional pain medication, otherwise tolerated procedure well. The wires and sheath were removed and manual pressure held until hemostasis. Patient was transferred back in stable condition. Findings: Patent IVC, patent right common iliac and external iliac veins. High-grade stenosis about 70 to 80% of the left common iliac vein, patent left external iliac vein. Good apposition of the stent after deployment.     Disposition:aroused from sedation, and taken to the recovery room in a stable condition    Darnell Schroeder DO  5/4/2021 10:44 AM

## 2021-05-04 NOTE — INTERVAL H&P NOTE
Update History & Physical    The patient's History and Physical of April 29, 2021 was reviewed with the patient and I examined the patient. There was no change. The surgical site was confirmed by the patient and me. Plan: The risks, benefits, expected outcome, and alternative to the recommended procedure have been discussed with the patient. Patient understands and wants to proceed with the procedure.    ASA III, Mallenpati 3    Electronically signed by Shaan Bowden DO on 5/4/2021 at 9:37 AM

## 2021-05-18 ENCOUNTER — VIRTUAL VISIT (OUTPATIENT)
Dept: VASCULAR SURGERY | Age: 36
End: 2021-05-18
Payer: MEDICAID

## 2021-05-18 DIAGNOSIS — M79.89 LEG SWELLING: ICD-10-CM

## 2021-05-18 DIAGNOSIS — I87.2 VENOUS INSUFFICIENCY: Primary | ICD-10-CM

## 2021-05-18 PROCEDURE — 99212 OFFICE O/P EST SF 10 MIN: CPT | Performed by: NURSE PRACTITIONER

## 2021-05-18 NOTE — PROGRESS NOTES
Dilia Venegas (:  1985) is a 38 y.o. female,Established patient, here for evaluation of the following chief complaint(s): Follow-up          SUBJECTIVE/OBJECTIVE:    She has a known history of of chronic venous insufficiency. She reports lower extremity edema on the  Bilateral leg(s) and pain is aggravated by upright posture. She reports previous treatment includes support hose in the past, ablation and now venogram with iliac vein angioplasty/stent. She does not have a history of spontaneous rupture. She reports her swelling has not improved since her stent. Differential diagnosis for leg pain includes but is not limited to: varicose veins, peripheral vascular disease, arthritic pain, DVT, lumbar disc disease, and neurogenic pain. Dilia Venegas is a 38 y.o. female with the following history as recorded in [facility]:  Patient Active Problem List    Diagnosis Date Noted    Leg swelling 2020    Pain in both lower extremities 2020    Varicose veins of left lower extremity 2020    PCOS (polycystic ovarian syndrome) 2016    Depression (emotion) 2016    History of hepatitis C virus infection 2015    Maternal MCV (mean corpuscular volume) low 2013    Cancer screening 2013    Anxiety and depression 2013     Current Outpatient Medications   Medication Sig Dispense Refill    traZODone (DESYREL) 150 MG tablet Take 150 mg by mouth nightly      apixaban (ELIQUIS) 2.5 MG TABS tablet Take 1 tablet by mouth 2 times daily 60 tablet 0    amphetamine-dextroamphetamine (ADDERALL) 10 MG tablet Take 10 mg by mouth daily. Dr Kaela Kent      LORazepam (ATIVAN) 0.5 MG tablet Take 0.5 mg by mouth nightly.        lamoTRIgine (LAMICTAL) 200 MG tablet Take 200 mg by mouth daily       Multiple Vitamin (MULTI-VITAMINS PO) Take 1 tablet by mouth daily       escitalopram (LEXAPRO) 20 MG tablet Take 20 mg by mouth daily Take 1 in half tab daily to equal 30mg       No current facility-administered medications for this visit. Allergies: Adhesive tape  Past Medical History:   Diagnosis Date    Anxiety     Back pain     Depression     Hepatitis C     History of anemia     History of gallstones     History of kidney stones     Knee pain     left    Postpartum depression      Past Surgical History:   Procedure Laterality Date     SECTION      GASTRIC RESTRICTION SURGERY  2019    TONSILLECTOMY      VASCULAR SURGERY Left 2020    VI- L GSV RFA     Family History   Problem Relation Age of Onset    Cancer Sister         skin cancer, leukemia    Heart Disease Sister     Breast Cancer Sister     Celiac Disease Sister     Other Sister         lyme dz    Diabetes Mother     Thyroid Disease Mother     Lung Cancer Maternal Grandfather     Cancer Maternal Grandfather         throat cancer    Thyroid Disease Sister     Colon Cancer Neg Hx     Colon Polyps Neg Hx     Esophageal Cancer Neg Hx     Liver Cancer Neg Hx     Liver Disease Neg Hx     Rectal Cancer Neg Hx     Stomach Cancer Neg Hx      Social History     Tobacco Use    Smoking status: Former Smoker     Quit date: 2010     Years since quittin.0    Smokeless tobacco: Never Used   Substance Use Topics    Alcohol use: No       ROS  Eyes  no sudden vision change or amaurosis. Respiratory  no significant shortness of breath,  Cardiovascular  no chest pain or syncope. No  significant leg swelling. No claudication. Musculoskeletal  no gait disturbance  Skin  no new wound. Neurologic   No speech difficulty or lateralizing weakness. All other review of systems are negative. No flowsheet data found. Physical Exam    Due to this being a TeleHealth encounter, evaluation of the following organ systems is limited: Vitals/Constitutional/EENT/Resp/CV/GI//MS/Neuro/Skin/Heme-Lymph-Imm. Constitutional  well developed, well nourished. No diaphoresis or acute distress.   Neck- ROM appears normal  Extremities -No cyanosis, clubbing, has edema. No signs atheroembolic event. Pulmonary  effort appears normal.  No respiratory distress. No accessory muscle use  Neurologic  alert and oriented X 3. Cranial Nerves II-XII grossly intact  Skin  intact. No rash, erythema, or pallor. Psychiatric  mood, affect, and behavior appear normal.  Judgment and thought processes appear normal.    Risk factors for atherosclerosis of all vascular beds have been reviewed with the patient including:  Family history, tobacco abuse in all forms, elevated cholesterol, hyperlipidemia, and diabetes. Reviewed on this visit: venogram         ASSESSMENT/PLAN:  1. Venous insufficiency  2. Leg swelling    1. Venous insufficiency    2. Leg swelling           Recommend no smoking - discussed the effect tobacco has on illness;   Proceed with 12 weeks follow up  She will call if not better in 4 weeks  Support hose for life!!!! New Case is a 38 y.o. female being evaluated by a Virtual Visit (video visit) encounter to address concerns as mentioned above. A caregiver was present when appropriate. Due to this being a TeleHealth encounter (During LTLLQ-49 public health emergency), evaluation of the following organ systems was limited: Vitals/Constitutional/EENT/Resp/CV/GI//MS/Neuro/Skin/Heme-Lymph-Imm. Pursuant to the emergency declaration under the [address] [place] [address] authority and the [employer] and [employer] Act, this Virtual Visit was conducted with patient's (and/or legal guardian's) consent, to reduce the patient's risk of exposure to COVID-19 and provide necessary medical care. The patient (and/or legal guardian) has also been advised to contact this office for worsening conditions or problems, and seek emergency medical treatment and/or call 911 if deemed necessary.      Patient identification was verified at

## 2021-05-25 RX ORDER — ASPIRIN 81 MG/1
81 TABLET ORAL DAILY
COMMUNITY

## 2021-05-25 NOTE — PROGRESS NOTES
 Back pain     Depression     Hepatitis C     History of anemia     History of gallstones     History of kidney stones     Knee pain     left    Postpartum depression      Past Surgical History:   Procedure Laterality Date     SECTION      GASTRIC RESTRICTION SURGERY  2019    TONSILLECTOMY      VASCULAR SURGERY Left 2020    VI- L GSV RFA     Family History   Problem Relation Age of Onset    Cancer Sister         skin cancer, leukemia    Heart Disease Sister     Breast Cancer Sister     Celiac Disease Sister     Other Sister         lyme dz    Diabetes Mother     Thyroid Disease Mother     Lung Cancer Maternal Grandfather     Cancer Maternal Grandfather         throat cancer    Thyroid Disease Sister     Colon Cancer Neg Hx     Colon Polyps Neg Hx     Esophageal Cancer Neg Hx     Liver Cancer Neg Hx     Liver Disease Neg Hx     Rectal Cancer Neg Hx     Stomach Cancer Neg Hx      Social History     Tobacco Use    Smoking status: Former Smoker     Quit date: 2010     Years since quittin.0    Smokeless tobacco: Never Used   Substance Use Topics    Alcohol use: No       Review of Systems    Constitutional  no significant activity change, appetite change, or unexpected weight change. No fever or chills. No diaphoresis or significant fatigue. HENT  no significant rhinorrhea or epistaxis. No tinnitus or significant hearing loss. Eyes  no sudden vision change or amaurosis. Respiratory  no significant shortness of breath, wheezing, or stridor. No apnea, cough, or chest tightness associated with shortness of breath. Cardiovascular  no chest pain, syncope, or significant dizziness. No palpitations. (see HPI)  Gastrointestinal  no abdominal swelling or pain. No blood in stool. No severe constipation, diarrhea, nausea, or vomiting. Genitourinary  No difficulty urinating, dysuria, frequency, or urgency. No flank pain or hematuria.   Musculoskeletal  no back pain, gait disturbance, or myalgia. Skin  no color change, rash, pallor, or new wound. Neurologic  no dizziness, facial asymmetry, or light headedness. No seizures. No speech difficulty or lateralizing weakness. Hematologic  no easy bruising or excessive bleeding. Psychiatric  no severe anxiety or nervousness. No confusion. All other review of systems are negative. Physical Exam    /85 (Site: Left Upper Arm)   Pulse 89   Temp 97.8 °F (36.6 °C) (Temporal)   Resp 18   Ht 5' 4\" (1.626 m)   Wt 190 lb (86.2 kg)   SpO2 98%   BMI 32.61 kg/m²     Constitutional  well developed, well nourished. No diaphoresis or acute distress. HENT  head normocephalic. Right external ear canal appears normal.  Left external ear canal appears normal.  Septum appears midline. Eyes  conjunctiva normal.  EOMS normal.  No exudate. No icterus. Neck- ROM appears normal, no tracheal deviation. Cardiovascular  Regular rate and rhythm. Heart sounds are normal.  No murmur, rub, or gallop. Carotid pulses are 2+ to palpation bilaterally without bruit. Extremities - Radial and brachial pulses are 2+ to palpation bilaterally. DP and PT pulses are palpable. No cyanosis or clubbing. She still has left leg swelling. She has multiple, large, prominent varicosities mainly on the left leg. she has some spider veins bilaterally. No signs atheroembolic event. Pulmonary  effort appears normal.  No respiratory distress. Lungs - Breath sounds normal. No wheezes or rales. GI - Abdomen  soft, non tender, bowel sounds X 4 quadrants. No guarding or rebound tenderness. No distension or palpable mass. Genitourinary  deferred. Musculoskeletal  ROM appears normal.  Neurologic  alert and oriented X 3. Physiologic. Skin  warm, dry, and intact. No rash, erythema, or pallor. Psychiatric  mood, affect, and behavior appear normal.  Judgment and thought processes appear normal.      Assessment    1.  Leg swelling    2.  Varicose veins of left lower extremity with pain          Plan      Recommend Nsaids for pain control  Support hose  20-30 mmHg compression (script given)  Recommend leg elevation above the level of the heart  Good moisturization  Good skin care  I will discuss the case with Dr. Lion Green and we will notify her with further recommendations

## 2021-07-28 RX ORDER — APIXABAN 2.5 MG/1
TABLET, FILM COATED ORAL
Qty: 60 TABLET | Refills: 0 | Status: SHIPPED | OUTPATIENT
Start: 2021-07-28 | End: 2021-09-08

## 2021-08-16 ENCOUNTER — OFFICE VISIT (OUTPATIENT)
Dept: PRIMARY CARE CLINIC | Age: 36
End: 2021-08-16
Payer: MEDICAID

## 2021-08-16 VITALS
OXYGEN SATURATION: 98 % | BODY MASS INDEX: 32.44 KG/M2 | SYSTOLIC BLOOD PRESSURE: 128 MMHG | DIASTOLIC BLOOD PRESSURE: 88 MMHG | HEIGHT: 64 IN | TEMPERATURE: 97.3 F | RESPIRATION RATE: 16 BRPM | WEIGHT: 190 LBS | HEART RATE: 88 BPM

## 2021-08-16 DIAGNOSIS — E28.2 PCOS (POLYCYSTIC OVARIAN SYNDROME): ICD-10-CM

## 2021-08-16 DIAGNOSIS — M72.2 PLANTAR FASCIITIS OF LEFT FOOT: ICD-10-CM

## 2021-08-16 DIAGNOSIS — L98.9 SKIN LESION: ICD-10-CM

## 2021-08-16 DIAGNOSIS — Z00.00 WELL WOMAN EXAM WITHOUT GYNECOLOGICAL EXAM: Primary | ICD-10-CM

## 2021-08-16 DIAGNOSIS — Z00.00 WELLNESS EXAMINATION: ICD-10-CM

## 2021-08-16 DIAGNOSIS — Z12.4 SCREENING FOR CERVICAL CANCER: ICD-10-CM

## 2021-08-16 LAB
ALBUMIN SERPL-MCNC: 4.2 G/DL (ref 3.5–5.2)
ALP BLD-CCNC: 59 U/L (ref 35–104)
ALT SERPL-CCNC: 17 U/L (ref 5–33)
ANION GAP SERPL CALCULATED.3IONS-SCNC: 11 MMOL/L (ref 7–19)
AST SERPL-CCNC: 21 U/L (ref 5–32)
BASOPHILS ABSOLUTE: 0.1 K/UL (ref 0–0.2)
BASOPHILS RELATIVE PERCENT: 1.2 % (ref 0–1)
BILIRUB SERPL-MCNC: <0.2 MG/DL (ref 0.2–1.2)
BUN BLDV-MCNC: 19 MG/DL (ref 6–20)
CALCIUM SERPL-MCNC: 9.3 MG/DL (ref 8.6–10)
CHLORIDE BLD-SCNC: 100 MMOL/L (ref 98–111)
CHOLESTEROL, TOTAL: 213 MG/DL (ref 160–199)
CO2: 28 MMOL/L (ref 22–29)
CREAT SERPL-MCNC: 0.6 MG/DL (ref 0.5–0.9)
EOSINOPHILS ABSOLUTE: 0.1 K/UL (ref 0–0.6)
EOSINOPHILS RELATIVE PERCENT: 2.1 % (ref 0–5)
GFR AFRICAN AMERICAN: >59
GFR NON-AFRICAN AMERICAN: >60
GLUCOSE BLD-MCNC: 97 MG/DL (ref 74–109)
HBA1C MFR BLD: 5.3 % (ref 4–6)
HCT VFR BLD CALC: 41 % (ref 37–47)
HDLC SERPL-MCNC: 60 MG/DL (ref 65–121)
HEMOGLOBIN: 12.8 G/DL (ref 12–16)
IMMATURE GRANULOCYTES #: 0 K/UL
LDL CHOLESTEROL CALCULATED: 138 MG/DL
LYMPHOCYTES ABSOLUTE: 2.2 K/UL (ref 1.1–4.5)
LYMPHOCYTES RELATIVE PERCENT: 33.1 % (ref 20–40)
MCH RBC QN AUTO: 25 PG (ref 27–31)
MCHC RBC AUTO-ENTMCNC: 31.2 G/DL (ref 33–37)
MCV RBC AUTO: 80.2 FL (ref 81–99)
MONOCYTES ABSOLUTE: 0.4 K/UL (ref 0–0.9)
MONOCYTES RELATIVE PERCENT: 5.5 % (ref 0–10)
NEUTROPHILS ABSOLUTE: 3.9 K/UL (ref 1.5–7.5)
NEUTROPHILS RELATIVE PERCENT: 57.7 % (ref 50–65)
PDW BLD-RTO: 13.7 % (ref 11.5–14.5)
PLATELET # BLD: 265 K/UL (ref 130–400)
PLATELET SLIDE REVIEW: ADEQUATE
PMV BLD AUTO: 9.7 FL (ref 9.4–12.3)
POTASSIUM SERPL-SCNC: 4.3 MMOL/L (ref 3.5–5)
RBC # BLD: 5.11 M/UL (ref 4.2–5.4)
RBC # BLD: NORMAL 10*6/UL
SODIUM BLD-SCNC: 139 MMOL/L (ref 136–145)
TOTAL PROTEIN: 7.5 G/DL (ref 6.6–8.7)
TRIGL SERPL-MCNC: 76 MG/DL (ref 0–149)
TSH SERPL DL<=0.05 MIU/L-ACNC: 1 UIU/ML (ref 0.27–4.2)
WBC # BLD: 6.7 K/UL (ref 4.8–10.8)

## 2021-08-16 PROCEDURE — 99395 PREV VISIT EST AGE 18-39: CPT | Performed by: FAMILY MEDICINE

## 2021-08-16 RX ORDER — LAMOTRIGINE 150 MG/1
75 TABLET ORAL
COMMUNITY
Start: 2021-08-03

## 2021-08-16 ASSESSMENT — ENCOUNTER SYMPTOMS
COLOR CHANGE: 0
NAUSEA: 0
WHEEZING: 0
DIARRHEA: 0
BACK PAIN: 0
ABDOMINAL PAIN: 0
EYE DISCHARGE: 0
COUGH: 0
VOMITING: 0

## 2021-08-16 NOTE — PROGRESS NOTES
SUBJECTIVE:    Patient ID: Jodi Bliss is a 38 y.o. female. HPI:   Patient presents today for a physical.  She states that overall she is doing okay. She has a history of PCOS and is not on anything for this. She is fasting. She would like a referral to GYN for her routine Pap. She states that she would like to see Tyra Cerda. She states that she has a ParaGard in place and she is on year 5 with that. She states that she knows she is going to have to think about something different for next year. She does have a history of anxiety and depression. She is followed by behavioral health. She states that she is also wanting a referral to a dermatologist for a routine skin check. She had gastric sleeve in 2019 she states that she has lost a significant amount of weight and is still working on diet and exercise. She does complain of left heel pain. She states that she had a venous surgery on the left leg due to varicose veins. She states that her left heel has been bothering her for a few months. She states that it is not getting any better. She does tend to go barefoot most of the time. She states that she has not had any injury to this foot. She denies any swelling or bruising. She is really not tried doing anything for it to this point.     Past Medical History:   Diagnosis Date    Anxiety     Back pain     Depression     Hepatitis C     History of anemia     History of gallstones     History of kidney stones     Knee pain     left    Postpartum depression       Current Outpatient Medications on File Prior to Visit   Medication Sig Dispense Refill    lamoTRIgine (LAMICTAL) 150 MG tablet TAKE 1 TABLET BY MOUTH ONCE DAILY FOR 90 DAYS      ELIQUIS 2.5 MG TABS tablet Take 1 tablet by mouth twice daily 60 tablet 0    aspirin 81 MG EC tablet Take 81 mg by mouth daily      traZODone (DESYREL) 150 MG tablet Take 150 mg by mouth nightly      amphetamine-dextroamphetamine (ADDERALL) 10 MG tablet Take 10 mg by mouth daily. Dr Ngozi Kilgore      LORazepam (ATIVAN) 0.5 MG tablet Take 0.5 mg by mouth nightly.  Multiple Vitamin (MULTI-VITAMINS PO) Take 1 tablet by mouth daily       escitalopram (LEXAPRO) 20 MG tablet Take 20 mg by mouth daily Take 1 in half tab daily to equal 30mg       No current facility-administered medications on file prior to visit. Allergies   Allergen Reactions    Adhesive Tape        Review of Systems   Constitutional: Negative for activity change, appetite change and fever. HENT: Negative for congestion and nosebleeds. Eyes: Negative for discharge. Respiratory: Negative for cough and wheezing. Cardiovascular: Negative for chest pain and leg swelling. Gastrointestinal: Negative for abdominal pain, diarrhea, nausea and vomiting. Genitourinary: Negative for difficulty urinating, frequency and urgency. Musculoskeletal: Negative for back pain and gait problem. Skin: Negative for color change and rash. Neurological: Negative for dizziness and headaches. Hematological: Does not bruise/bleed easily. Psychiatric/Behavioral: Negative for sleep disturbance and suicidal ideas. OBJECTIVE:    Physical Exam  Vitals reviewed. Constitutional:       General: She is not in acute distress. Appearance: Normal appearance. She is well-developed. She is not diaphoretic. HENT:      Head: Normocephalic and atraumatic. Right Ear: External ear normal.      Left Ear: External ear normal.   Cardiovascular:      Rate and Rhythm: Normal rate and regular rhythm. Pulses: Normal pulses. Heart sounds: Normal heart sounds. No murmur heard. Pulmonary:      Effort: Pulmonary effort is normal. No respiratory distress. Breath sounds: Normal breath sounds. Musculoskeletal:      Cervical back: Normal range of motion and neck supple. Skin:     General: Skin is warm and dry. Neurological:      General: No focal deficit present.       Mental Status: She is alert and oriented to person, place, and time. Mental status is at baseline. Psychiatric:         Mood and Affect: Mood normal.         Behavior: Behavior normal.         Thought Content: Thought content normal.         Judgment: Judgment normal.        /88 (Site: Left Upper Arm, Position: Sitting, Cuff Size: Large Adult)   Pulse 88   Temp 97.3 °F (36.3 °C) (Temporal)   Resp 16   Ht 5' 4\" (1.626 m)   Wt 190 lb (86.2 kg)   LMP 08/01/2021   SpO2 98%   BMI 32.61 kg/m²      ASSESSMENT/PLAN:    Sourav Carlos was seen today for annual exam, referral - general and foot pain. Diagnoses and all orders for this visit:    Well woman exam without gynecological exam    PCOS (polycystic ovarian syndrome)  -     Raymundo Young CNM, Gynecology, [place]    Screening for cervical cancer  -     Alexandra Pennington CNM, Gynecology, [place]    Skin lesion  -     External Referral To Dermatology    Wellness examination  -     CBC Auto Differential  -     Comprehensive Metabolic Panel  -     Lipid Panel  -     TSH without Reflex  -     Hemoglobin A1C    Plantar fasciitis of left foot        Problem List     PCOS (polycystic ovarian syndrome)    Relevant Orders    Alexanrda Pennington CNM, Gynecology, [facility]            Referral has been ordered for GYN for routine Pap. See fasting lab orders. We are going to draw labs on her today and will notify her of the results of those. Referral has been ordered for dermatology for routine annual skin check. Follow-up with us in 1 year for a physical unless needed sooner. EMR Dragon/transcription disclaimer:  Much of this encounter note is electronic transcription/translation of spoken language toprinted texts. The electronic translation of spoken language may be erroneous, or at times, nonsensical words or phrases may be inadvertently transcribed.   Although I have reviewed the note for such errors, some may stillexist.

## 2021-08-16 NOTE — PATIENT INSTRUCTIONS
Patient Education        Plantar Fasciitis: Care Instructions  Overview     Plantar fasciitis is pain and inflammation of the plantar fascia, the tissue at the bottom of your foot that connects the heel bone to the toes. The plantar fascia also supports the arch. If you strain the plantar fascia, it can develop small tears and cause heel pain when you stand or walk. Plantar fasciitis can be caused by running or other sports. It also may occur in people who are overweight or who have high arches or flat feet. You may get plantar fasciitis if you walk or stand for long periods, or have a tight Achilles tendon or calf muscles. You can improve your foot pain with rest and other care at home. It might take a few weeks to a few months for your foot to heal completely. Follow-up care is a key part of your treatment and safety. Be sure to make and go to all appointments, and call your doctor if you are having problems. It's also a good idea to know your test results and keep a list of the medicines you take. How can you care for yourself at home? · Rest your feet often. Reduce your activity to a level that lets you avoid pain. If possible, do not run or walk on hard surfaces. · Take pain medicines exactly as directed. ? If the doctor gave you a prescription medicine for pain, take it as prescribed. ? If you are not taking a prescription pain medicine, take an over-the-counter anti-inflammatory medicine for pain and swelling, such as ibuprofen (Advil, Motrin) or naproxen (Aleve). Read and follow all instructions on the label. · Use ice massage to help with pain and swelling. You can use an ice cube or an ice cup several times a day. To make an ice cup, fill a paper cup with water and freeze it. Cut off the top of the cup until a half-inch of ice shows. Hold onto the remaining paper to use the cup. Rub the ice in small circles over the area for 5 to 7 minutes.   · Contrast baths, which alternate hot and cold water, can also help reduce swelling. But because heat alone may make pain and swelling worse, end a contrast bath with a soak in cold water. · Wear a night splint if your doctor suggests it. A night splint holds your foot with the toes pointed up and the foot and ankle at a 90-degree angle. This position gives the bottom of your foot a constant, gentle stretch. · Do simple exercises such as calf stretches and towel stretches 2 to 3 times each day, especially when you first get up in the morning. These can help the plantar fascia become more flexible. They also make the muscles that support your arch stronger. Hold these stretches for 15 to 30 seconds per stretch. Repeat 2 to 4 times. ? Stand about 1 foot from a wall. Place the palms of both hands against the wall at chest level. Lean forward against the wall, keeping one leg with the knee straight and heel on the ground while bending the knee of the other leg.  ? Sit down on the floor or a mat with your feet stretched in front of you. Roll up a towel lengthwise, and loop it over the ball of your foot. Holding the towel at both ends, gently pull the towel toward you to stretch your foot. · Wear shoes with good arch support. Athletic shoes or shoes with a well-cushioned sole are good choices. · Replace athletic shoes regularly. · Try heel cups or shoe inserts (orthotics) to help cushion your heel. You can buy these at many shoe stores. · Put on your shoes as soon as you get out of bed. Going barefoot or wearing slippers may make your pain worse. · Reach and stay at a good weight for your height. This puts less strain on your feet. When should you call for help? Call your doctor now or seek immediate medical care if:    · You have heel pain with fever, redness, or warmth in your heel.     · You cannot put weight on the sore foot.    Watch closely for changes in your health, and be sure to contact your doctor if:    · You have numbness or tingling in your heel.     · Your heel pain lasts more than 2 weeks. Where can you learn more? Go to https://chpepiceweb.[employer]. org and sign in to your [employer] account. Enter U890 in the [facility] box to learn more about \"Plantar Fasciitis: Care Instructions. \"     If you do not have an account, please click on the \"Sign Up Now\" link. Current as of: November 16, 2020               Content Version: 12.9  © 2006-2021 [employer] Care instructions adapted under license by [facility] ([facility]). If you have questions about a medical condition or this instruction, always ask your healthcare professional. [place] [address] any warranty or liability for your use of this information. Patient Education        Plantar Fasciitis: Exercises  Introduction  Here are some examples of exercises for you to try. The exercises may be suggested for a condition or for rehabilitation. Start each exercise slowly. Ease off the exercises if you start to have pain. You will be told when to start these exercises and which ones will work best for you. How to do the exercises  Towel stretch   1. Sit with your legs extended and knees straight. 2. Place a towel around your foot just under the toes. 3. Hold each end of the towel in each hand, with your hands above your knees. 4. Pull back with the towel so that your foot stretches toward you. 5. Hold the position for at least 15 to 30 seconds. 6. Repeat 2 to 4 times a session, up to 5 sessions a day. Calf stretch   This exercise stretches the muscles at the back of the lower leg (the calf) and the Achilles tendon. Do this exercise 3 or 4 times a day, 5 days a week. 1. Stand facing a wall with your hands on the wall at about eye level. Put the leg you want to stretch about a step behind your other leg. 2. Keeping your back heel on the floor, bend your front knee until you feel a stretch in the back leg. 3. Hold the stretch for 15 to 30 seconds.  Repeat 2 to 4 times. Plantar fascia and calf stretch   Stretching the plantar fascia and calf muscles can increase flexibility and decrease heel pain. You can do this exercise several times each day and before and after activity. 1. Stand on a step as shown above. Be sure to hold on to the banister. 2. Slowly let your heels down over the edge of the step as you relax your calf muscles. You should feel a gentle stretch across the bottom of your foot and up the back of your leg to your knee. 3. Hold the stretch about 15 to 30 seconds, and then tighten your calf muscle a little to bring your heel back up to the level of the step. Repeat 2 to 4 times. Towel curls   Make this exercise more challenging by placing a weighted object, such as a soup can, on the other end of the towel. 1. While sitting, place your foot on a towel on the floor and scrunch the towel toward you with your toes. 2. Then, also using your toes, push the towel away from you. [place] pickups   1. Put marbles on the floor next to a cup.  2. Using your toes, try to lift the marbles up from the floor and put them in the cup. Follow-up care is a key part of your treatment and safety. Be sure to make and go to all appointments, and call your doctor if you are having problems. It's also a good idea to know your test results and keep a list of the medicines you take. Where can you learn more? Go to https://ACTV8.Idea.me. org and sign in to your [employer] account. Enter F121 in the [facility] box to learn more about \"Plantar Fasciitis: Exercises. \"     If you do not have an account, please click on the \"Sign Up Now\" link. Current as of: November 16, 2020               Content Version: 12.9  © 1346-9001 [employer]. Care instructions adapted under license by [facility] ([facility]).  If you have questions about a medical condition or this instruction, always ask your healthcare professional. Sukhdev Rosas disclaims any warranty or liability for your use of this information.

## 2021-09-08 ENCOUNTER — OFFICE VISIT (OUTPATIENT)
Dept: VASCULAR SURGERY | Age: 36
End: 2021-09-08
Payer: MEDICAID

## 2021-09-08 VITALS
HEART RATE: 81 BPM | OXYGEN SATURATION: 97 % | RESPIRATION RATE: 18 BRPM | SYSTOLIC BLOOD PRESSURE: 122 MMHG | HEIGHT: 64 IN | WEIGHT: 184 LBS | BODY MASS INDEX: 31.41 KG/M2 | TEMPERATURE: 97.5 F | DIASTOLIC BLOOD PRESSURE: 81 MMHG

## 2021-09-08 DIAGNOSIS — I87.2 VENOUS INSUFFICIENCY OF BOTH LOWER EXTREMITIES: Primary | ICD-10-CM

## 2021-09-08 DIAGNOSIS — M79.605 LEG PAIN, LEFT: ICD-10-CM

## 2021-09-08 DIAGNOSIS — M79.89 LEG SWELLING: ICD-10-CM

## 2021-09-08 PROCEDURE — 99213 OFFICE O/P EST LOW 20 MIN: CPT | Performed by: NURSE PRACTITIONER

## 2021-09-08 RX ORDER — APIXABAN 2.5 MG/1
TABLET, FILM COATED ORAL
Qty: 60 TABLET | Refills: 0 | Status: SHIPPED | OUTPATIENT
Start: 2021-09-08 | End: 2021-10-14 | Stop reason: SDUPTHER

## 2021-09-08 NOTE — PROGRESS NOTES
Please call Mavis    Still was swelling        Shubham Dela Cruz (:  1985) is a 38 y.o. female,Established patient, here for evaluation of the following chief complaint(s):  Follow-up (3 month f/u no study per Ana Wade *tyrone wants to keep this apt*)            SUBJECTIVE/OBJECTIVE:  She has a known history of of chronic venous insufficiency. She reports prominent veins on the  Left leg(s), lower extremity edema on the  Left leg(s), the veins are painful -- severity:  moderate and pain is aggravated by upright posture. She reports previous treatment includes prescription compression stockings for > 6 months, ablation left leg, left leg iliac vein stent. She has swelling in the right leg but it has never been as bad. She reports it all started in  about 2 weeks after delivery of her child. . She does not have a history of spontaneous rupture. Grandmother  of clot. Differential diagnosis for leg pain includes but is not limited to: varicose veins, peripheral vascular disease, arthritic pain, DVT, lumbar disc disease, and neurogenic pain.           Shubham Dela Cruz is a 38 y.o. female with the following history as recorded in [facility]:  Patient Active Problem List    Diagnosis Date Noted    Leg swelling 2020    Pain in both lower extremities 2020    Varicose veins of left lower extremity 2020    PCOS (polycystic ovarian syndrome) 2016    Depression (emotion) 2016    History of hepatitis C virus infection 2015    Maternal MCV (mean corpuscular volume) low 2013    Cancer screening 2013    Anxiety and depression 2013     Current Outpatient Medications   Medication Sig Dispense Refill    ELIQUIS 2.5 MG TABS tablet Take 1 tablet by mouth twice daily 60 tablet 0    lamoTRIgine (LAMICTAL) 150 MG tablet TAKE 1 TABLET BY MOUTH ONCE DAILY FOR 90 DAYS      aspirin 81 MG EC tablet Take 81 mg by mouth daily      traZODone (DESYREL) 150 MG tablet Take 150 mg by mouth nightly      amphetamine-dextroamphetamine (ADDERALL) 10 MG tablet Take 10 mg by mouth daily. Dr Tomasa Gonzales      LORazepam (ATIVAN) 0.5 MG tablet Take 0.5 mg by mouth nightly.  Multiple Vitamin (MULTI-VITAMINS PO) Take 1 tablet by mouth daily       escitalopram (LEXAPRO) 20 MG tablet Take 20 mg by mouth daily Take 1 in half tab daily to equal 30mg       No current facility-administered medications for this visit. Allergies: Adhesive tape  Past Medical History:   Diagnosis Date    Anxiety     Back pain     Depression     Hepatitis C     History of anemia     History of gallstones     History of kidney stones     Knee pain     left    Postpartum depression      Past Surgical History:   Procedure Laterality Date     SECTION      GASTRIC RESTRICTION SURGERY  2019    TONSILLECTOMY      VASCULAR SURGERY Left 2020    VI- L GSV RFA     Family History   Problem Relation Age of Onset    Cancer Sister         skin cancer, leukemia    Heart Disease Sister     Breast Cancer Sister     Celiac Disease Sister     Other Sister         lyme dz    Diabetes Mother     Thyroid Disease Mother     Lung Cancer Maternal Grandfather     Cancer Maternal Grandfather         throat cancer    Thyroid Disease Sister     Colon Cancer Neg Hx     Colon Polyps Neg Hx     Esophageal Cancer Neg Hx     Liver Cancer Neg Hx     Liver Disease Neg Hx     Rectal Cancer Neg Hx     Stomach Cancer Neg Hx      Social History     Tobacco Use    Smoking status: Former Smoker     Quit date: 2010     Years since quittin.3    Smokeless tobacco: Never Used   Substance Use Topics    Alcohol use: No       ROS  Eyes  no sudden vision change or amaurosis. Respiratory  no significant shortness of breath,  Cardiovascular  no chest pain or syncope. No  significant leg swelling. No claudication. Musculoskeletal  no gait disturbance  Skin  no new wound.   Neurologic   No speech difficulty or lateralizing weakness. All other review of systems are negative. Physical Exam    /81 (Site: Right Upper Arm)   Pulse 81   Temp 97.5 °F (36.4 °C)   Resp 18   Ht 5' 4\" (1.626 m)   Wt 184 lb (83.5 kg)   SpO2 97%   BMI 31.58 kg/m²       Neck- carotid pulses 2+ to palpation with no bruit  Cardiovascular  Regular rate and rhythm. Pulmonary  effort appears normal.  No respiratory distress. Lungs - Breath sounds normal. No wheezes or rales. Extremities - Radial and brachial pulses are 2+ to palpation bilaterally. Right femoral pulse: present 2+; Right popliteal pulse: absent Right DP: absent; Right PT absent; Left femoral pulse: present 2+; Left popliteal pulse: absent; Left DP: absent; Left PT: absent No cyanosis, clubbing, or significant edema. No signs atheroembolic event. Right calf 41.5 and left 44.5 with hose compression of left  Neurologic  alert and oriented X 3. Physiologic. Face symmetric. Skin  warm, dry, and intact. no wound  Psychiatric  mood, affect, and behavior appear normal.  Judgment and thought processes appear normal.    Risk factors for atherosclerosis of all vascular beds have been reviewed with the patient including:  Family history, tobacco abuse in all forms, elevated cholesterol, hyperlipidemia, and diabetes. Reviewed previous studies including: venous scan  Individual images were reviewed. I agree with the findings  Results were discussed with the patient. ASSESSMENT/PLAN:  1. Venous insufficiency of both lower extremities  -     VL Extremity Venous Bilateral; Future  2. Leg pain, left  -     VL Extremity Venous Bilateral; Future  3. Leg swelling  -     VL Extremity Venous Bilateral; Future    1. Venous insufficiency of both lower extremities    2. Leg pain, left    3.  Leg swelling     chronic illness with exacerbation or progression    Support hose  20-30 mmHg compression  Start/Continue ASA EC 81 mg daily  Leg elevation  Good moisturization  Good skin

## 2021-09-08 NOTE — Clinical Note
Please call patient. Let her know dr. Sherren Roses reviewed her study. She would be willing to repeat venogram to see if stent needs re-ballooning if she wants.   This would be outpatient procedure

## 2021-09-08 NOTE — Clinical Note
Please let her know Dr. Emma Maher reviewed everything. She wants a repeat reflux study. Please schedule as reflux study with iliac stent evaluation.   I will place order

## 2021-09-10 ENCOUNTER — PATIENT MESSAGE (OUTPATIENT)
Dept: VASCULAR SURGERY | Age: 36
End: 2021-09-10

## 2021-10-06 ENCOUNTER — HOSPITAL ENCOUNTER (OUTPATIENT)
Dept: VASCULAR LAB | Age: 36
Discharge: HOME | End: 2021-10-06
Payer: MEDICAID

## 2021-10-06 DIAGNOSIS — M79.89 LEG SWELLING: ICD-10-CM

## 2021-10-06 DIAGNOSIS — I87.2 VENOUS INSUFFICIENCY OF BOTH LOWER EXTREMITIES: ICD-10-CM

## 2021-10-06 DIAGNOSIS — M79.605 LEG PAIN, LEFT: ICD-10-CM

## 2021-10-06 PROCEDURE — 93970 EXTREMITY STUDY: CPT

## 2021-10-14 ENCOUNTER — TELEPHONE (OUTPATIENT)
Dept: VASCULAR SURGERY | Age: 36
End: 2021-10-14

## 2021-10-14 NOTE — TELEPHONE ENCOUNTER
----- Message from ASHLY Trujillo sent at 10/14/2021  2:12 PM CDT -----  Please call patient. Let her know dr. Maynor Davalos reviewed her study. She would be willing to repeat venogram to see if stent needs re-ballooning if she wants.   This would be outpatient procedure

## 2021-10-14 NOTE — TELEPHONE ENCOUNTER
Per [place] Floor s/w Mary Stevens and made her aware Dr. Jeanine Valentine reviewed her study. [facility] would be willing to repeat venogram to see if stent needs re-ballooning if she wants.  This would be outpatient procedure. Mary Stevens verbally understood and is agreeable to get a Venogram scheduled. I will make our surgery scheduler aware of this. I told Janeen Has we will be contacting her to get this procedure scheduled.

## 2021-10-19 DIAGNOSIS — Z01.818 PRE-OP TESTING: Primary | ICD-10-CM

## 2021-10-19 RX ORDER — ASPIRIN 81 MG/1
81 TABLET ORAL ONCE
Status: CANCELLED | OUTPATIENT
Start: 2021-10-19 | End: 2021-10-19

## 2021-10-19 RX ORDER — SODIUM CHLORIDE 9 MG/ML
INJECTION, SOLUTION INTRAVENOUS CONTINUOUS
Status: CANCELLED | OUTPATIENT
Start: 2021-10-19

## 2021-10-19 RX ORDER — SODIUM CHLORIDE 9 MG/ML
25 INJECTION, SOLUTION INTRAVENOUS PRN
Status: CANCELLED | OUTPATIENT
Start: 2021-10-19

## 2021-10-19 RX ORDER — CLONIDINE HYDROCHLORIDE 0.1 MG/1
0.1 TABLET ORAL PRN
Status: CANCELLED | OUTPATIENT
Start: 2021-10-19

## 2021-10-19 RX ORDER — SODIUM CHLORIDE 0.9 % (FLUSH) 0.9 %
10 SYRINGE (ML) INJECTION PRN
Status: CANCELLED | OUTPATIENT
Start: 2021-10-19

## 2021-10-19 NOTE — H&P
Still was swelling        Keon Reynolds (:  1985) is a 38 y.o. female,Established patient, here for evaluation of the following chief complaint(s):  Follow-up (3 month f/u no study per Virginia Lemus *tyrone wants to keep this apt*)            SUBJECTIVE/OBJECTIVE:  She has a known history of of chronic venous insufficiency. She reports prominent veins on the  Left leg(s), lower extremity edema on the  Left leg(s), the veins are painful -- severity:  moderate and pain is aggravated by upright posture. She reports previous treatment includes prescription compression stockings for > 6 months, ablation left leg, left leg iliac vein stent. She has swelling in the right leg but it has never been as bad. She reports it all started in  about 2 weeks after delivery of her child. . She does not have a history of spontaneous rupture. Grandmother  of clot. Differential diagnosis for leg pain includes but is not limited to: varicose veins, peripheral vascular disease, arthritic pain, DVT, lumbar disc disease, and neurogenic pain.           Keon Reynolds is a 38 y.o. female with the following history as recorded in [facility]:  Patient Active Problem List    Diagnosis Date Noted    Leg swelling 2020    Pain in both lower extremities 2020    Varicose veins of left lower extremity 2020    PCOS (polycystic ovarian syndrome) 2016    Depression (emotion) 2016    History of hepatitis C virus infection 2015    Maternal MCV (mean corpuscular volume) low 2013    Cancer screening 2013    Anxiety and depression 2013     Current Outpatient Medications   Medication Sig Dispense Refill    ELIQUIS 2.5 MG TABS tablet Take 1 tablet by mouth twice daily 60 tablet 0    lamoTRIgine (LAMICTAL) 150 MG tablet TAKE 1 TABLET BY MOUTH ONCE DAILY FOR 90 DAYS      aspirin 81 MG EC tablet Take 81 mg by mouth daily      traZODone (DESYREL) 150 MG tablet Take 150 mg by mouth weakness. All other review of systems are negative. Physical Exam    /81 (Site: Right Upper Arm)   Pulse 81   Temp 97.5 °F (36.4 °C)   Resp 18   Ht 5' 4\" (1.626 m)   Wt 184 lb (83.5 kg)   SpO2 97%   BMI 31.58 kg/m²       Neck- carotid pulses 2+ to palpation with no bruit  Cardiovascular  Regular rate and rhythm. Pulmonary  effort appears normal.  No respiratory distress. Lungs - Breath sounds normal. No wheezes or rales. Extremities - Radial and brachial pulses are 2+ to palpation bilaterally. Right femoral pulse: present 2+; Right popliteal pulse: absent Right DP: absent; Right PT absent; Left femoral pulse: present 2+; Left popliteal pulse: absent; Left DP: absent; Left PT: absent No cyanosis, clubbing, or significant edema. No signs atheroembolic event. Right calf 41.5 and left 44.5 with hose compression of left  Neurologic  alert and oriented X 3. Physiologic. Face symmetric. Skin  warm, dry, and intact. no wound  Psychiatric  mood, affect, and behavior appear normal.  Judgment and thought processes appear normal.    Risk factors for atherosclerosis of all vascular beds have been reviewed with the patient including:  Family history, tobacco abuse in all forms, elevated cholesterol, hyperlipidemia, and diabetes. Reviewed previous studies including: venous scan  Individual images were reviewed. I agree with the findings  Results were discussed with the patient. ASSESSMENT/PLAN:  1. Venous insufficiency of both lower extremities  -     VL Extremity Venous Bilateral; Future  2. Leg pain, left  -     VL Extremity Venous Bilateral; Future  3. Leg swelling  -     VL Extremity Venous Bilateral; Future    1. Venous insufficiency of both lower extremities    2. Leg pain, left    3.  Leg swelling     chronic illness with exacerbation or progression    Support hose  20-30 mmHg compression  Start/Continue ASA EC 81 mg daily  Leg elevation  Good moisturization  Good skin care  Follow up iafter venous scan for reflux to look at iliac stent  Diet   excercise   Discussed management of venous scan which includes:  continue eliquis to reduce risk of venous thrombosis  Recommend no smoking - discussed the effect tobacco has on illness; Addendum - venous scan -   Right Side: The greater saphenous vein exhibits reflux lasting for a    maximum of 6598 milliseconds in the low thigh. There is no evidence of    deep venous thrombosis in the segments insonated. There is 1430 seconds of    deep vein reflux.   Fantasma Fail is 6598 seconds of short saphenous vein reflux.    Greater saphenous vein ablated. It reopens distally, also reflux in the    anterior saphenous branch.        Left iliac vein stent appears patent    Left Side: The greater saphenous vein exhibits reflux lasting for a    maximum of 3051 milliseconds in the saphenofemoral junction. There is no    evidence of deep venous thrombosis in the segments insonated. There is    1254 seconds of deep vein reflux.   Fantasma Fail is 3777 seconds of short saphenous vein reflux     Individual images were reviewed. Results were reviewed with the patient. Options were discussed with the patient including continued medical management versus proceeding with venography and potential intervention for persistent leg swelling. Patient has opted to proceed with venography. Risks have been discussed with the patient including but not limited to MI, death, CVA, bleed, nerve injury, infection, contrast nephropathy, possible need for dialysis, and need for further surgery. An electronic signature was used to authenticate this note.     --ASHLY Ware

## 2021-11-29 ENCOUNTER — OFFICE VISIT (OUTPATIENT)
Age: 36
End: 2021-11-29

## 2021-11-29 DIAGNOSIS — Z01.818 PRE-OP TESTING: ICD-10-CM

## 2021-11-29 DIAGNOSIS — Z11.59 SCREENING FOR VIRAL DISEASE: Primary | ICD-10-CM

## 2021-11-29 LAB
ANION GAP SERPL CALCULATED.3IONS-SCNC: 8 MMOL/L (ref 7–19)
BUN BLDV-MCNC: 20 MG/DL (ref 6–20)
CALCIUM SERPL-MCNC: 8.8 MG/DL (ref 8.6–10)
CHLORIDE BLD-SCNC: 102 MMOL/L (ref 98–111)
CO2: 28 MMOL/L (ref 22–29)
CREAT SERPL-MCNC: 0.6 MG/DL (ref 0.5–0.9)
GFR AFRICAN AMERICAN: >59
GFR NON-AFRICAN AMERICAN: >60
GLUCOSE BLD-MCNC: 91 MG/DL (ref 74–109)
HCT VFR BLD CALC: 38.6 % (ref 37–47)
HEMOGLOBIN: 12 G/DL (ref 12–16)
MCH RBC QN AUTO: 25 PG (ref 27–31)
MCHC RBC AUTO-ENTMCNC: 31.1 G/DL (ref 33–37)
MCV RBC AUTO: 80.4 FL (ref 81–99)
PDW BLD-RTO: 14.6 % (ref 11.5–14.5)
PLATELET # BLD: 268 K/UL (ref 130–400)
PMV BLD AUTO: 9.1 FL (ref 9.4–12.3)
POTASSIUM SERPL-SCNC: 4.4 MMOL/L (ref 3.5–5)
RBC # BLD: 4.8 M/UL (ref 4.2–5.4)
SARS-COV-2, PCR: NOT DETECTED
SODIUM BLD-SCNC: 138 MMOL/L (ref 136–145)
WBC # BLD: 7.9 K/UL (ref 4.8–10.8)

## 2021-11-29 PROCEDURE — 99999 PR OFFICE/OUTPT VISIT,PROCEDURE ONLY: CPT | Performed by: NURSE PRACTITIONER

## 2021-12-01 ENCOUNTER — HOSPITAL ENCOUNTER (OUTPATIENT)
Dept: INTERVENTIONAL RADIOLOGY/VASCULAR | Age: 36
Discharge: HOME | End: 2021-12-01
Payer: MEDICAID

## 2021-12-01 VITALS
WEIGHT: 189 LBS | BODY MASS INDEX: 32.27 KG/M2 | DIASTOLIC BLOOD PRESSURE: 78 MMHG | SYSTOLIC BLOOD PRESSURE: 100 MMHG | HEIGHT: 64 IN | TEMPERATURE: 97 F | OXYGEN SATURATION: 96 % | HEART RATE: 72 BPM | RESPIRATION RATE: 16 BRPM

## 2021-12-01 DIAGNOSIS — M79.89 LEG SWELLING: ICD-10-CM

## 2021-12-01 LAB — HCG(URINE) PREGNANCY TEST: NEGATIVE

## 2021-12-01 PROCEDURE — 6360000004 HC RX CONTRAST MEDICATION: Performed by: SURGERY

## 2021-12-01 PROCEDURE — 99152 MOD SED SAME PHYS/QHP 5/>YRS: CPT

## 2021-12-01 PROCEDURE — 2580000003 HC RX 258: Performed by: NURSE PRACTITIONER

## 2021-12-01 PROCEDURE — 36010 PLACE CATHETER IN VEIN: CPT | Performed by: SURGERY

## 2021-12-01 PROCEDURE — 37252 INTRVASC US NONCORONARY 1ST: CPT

## 2021-12-01 PROCEDURE — 75820 VEIN X-RAY ARM/LEG: CPT | Performed by: SURGERY

## 2021-12-01 PROCEDURE — 84703 CHORIONIC GONADOTROPIN ASSAY: CPT

## 2021-12-01 PROCEDURE — 37252 INTRVASC US NONCORONARY 1ST: CPT | Performed by: SURGERY

## 2021-12-01 PROCEDURE — 99153 MOD SED SAME PHYS/QHP EA: CPT

## 2021-12-01 PROCEDURE — 6360000002 HC RX W HCPCS: Performed by: SURGERY

## 2021-12-01 PROCEDURE — 36010 PLACE CATHETER IN VEIN: CPT

## 2021-12-01 PROCEDURE — 6360000002 HC RX W HCPCS: Performed by: NURSE PRACTITIONER

## 2021-12-01 PROCEDURE — 6370000000 HC RX 637 (ALT 250 FOR IP): Performed by: NURSE PRACTITIONER

## 2021-12-01 PROCEDURE — 37253 INTRVASC US NONCORONARY ADDL: CPT

## 2021-12-01 PROCEDURE — C1769 GUIDE WIRE: HCPCS

## 2021-12-01 PROCEDURE — 75820 VEIN X-RAY ARM/LEG: CPT

## 2021-12-01 RX ORDER — SODIUM CHLORIDE 0.9 % (FLUSH) 0.9 %
10 SYRINGE (ML) INJECTION PRN
Status: DISCONTINUED | OUTPATIENT
Start: 2021-12-01 | End: 2021-12-03 | Stop reason: HOSPADM

## 2021-12-01 RX ORDER — FENTANYL CITRATE 50 UG/ML
INJECTION, SOLUTION INTRAMUSCULAR; INTRAVENOUS
Status: COMPLETED | OUTPATIENT
Start: 2021-12-01 | End: 2021-12-01

## 2021-12-01 RX ORDER — IODIXANOL 320 MG/ML
INJECTION, SOLUTION INTRAVASCULAR
Status: COMPLETED | OUTPATIENT
Start: 2021-12-01 | End: 2021-12-01

## 2021-12-01 RX ORDER — SODIUM CHLORIDE 9 MG/ML
25 INJECTION, SOLUTION INTRAVENOUS PRN
Status: DISCONTINUED | OUTPATIENT
Start: 2021-12-01 | End: 2021-12-03 | Stop reason: HOSPADM

## 2021-12-01 RX ORDER — CLONIDINE HYDROCHLORIDE 0.1 MG/1
0.1 TABLET ORAL PRN
Status: DISCONTINUED | OUTPATIENT
Start: 2021-12-01 | End: 2021-12-03 | Stop reason: HOSPADM

## 2021-12-01 RX ORDER — ASPIRIN 81 MG/1
81 TABLET ORAL ONCE
Status: COMPLETED | OUTPATIENT
Start: 2021-12-01 | End: 2021-12-01

## 2021-12-01 RX ORDER — SODIUM CHLORIDE 0.9 % (FLUSH) 0.9 %
5-40 SYRINGE (ML) INJECTION EVERY 12 HOURS SCHEDULED
Status: DISCONTINUED | OUTPATIENT
Start: 2021-12-01 | End: 2021-12-03 | Stop reason: HOSPADM

## 2021-12-01 RX ORDER — SODIUM CHLORIDE 0.9 % (FLUSH) 0.9 %
5-40 SYRINGE (ML) INJECTION PRN
Status: DISCONTINUED | OUTPATIENT
Start: 2021-12-01 | End: 2021-12-03 | Stop reason: HOSPADM

## 2021-12-01 RX ORDER — CEFAZOLIN SODIUM 2 G/100ML
2000 INJECTION, SOLUTION INTRAVENOUS
Status: COMPLETED | OUTPATIENT
Start: 2021-12-01 | End: 2021-12-01

## 2021-12-01 RX ORDER — HEPARIN SODIUM 5000 [USP'U]/ML
INJECTION, SOLUTION INTRAVENOUS; SUBCUTANEOUS
Status: COMPLETED | OUTPATIENT
Start: 2021-12-01 | End: 2021-12-01

## 2021-12-01 RX ORDER — MIDAZOLAM HYDROCHLORIDE 1 MG/ML
INJECTION INTRAMUSCULAR; INTRAVENOUS
Status: COMPLETED | OUTPATIENT
Start: 2021-12-01 | End: 2021-12-01

## 2021-12-01 RX ORDER — SODIUM CHLORIDE 9 MG/ML
INJECTION, SOLUTION INTRAVENOUS CONTINUOUS
Status: ACTIVE | OUTPATIENT
Start: 2021-12-01 | End: 2021-12-01

## 2021-12-01 RX ORDER — SODIUM CHLORIDE 9 MG/ML
INJECTION, SOLUTION INTRAVENOUS CONTINUOUS
Status: DISCONTINUED | OUTPATIENT
Start: 2021-12-01 | End: 2021-12-03 | Stop reason: HOSPADM

## 2021-12-01 RX ADMIN — SODIUM CHLORIDE: 9 INJECTION, SOLUTION INTRAVENOUS at 07:36

## 2021-12-01 RX ADMIN — IODIXANOL 15 ML: 320 INJECTION, SOLUTION INTRAVASCULAR at 09:20

## 2021-12-01 RX ADMIN — CEFAZOLIN SODIUM 2000 MG: 2 INJECTION, SOLUTION INTRAVENOUS at 08:36

## 2021-12-01 RX ADMIN — FENTANYL CITRATE 25 MCG: 50 INJECTION, SOLUTION INTRAMUSCULAR; INTRAVENOUS at 08:47

## 2021-12-01 RX ADMIN — ASPIRIN 81 MG: 81 TABLET, COATED ORAL at 07:44

## 2021-12-01 RX ADMIN — MIDAZOLAM 0.5 MG: 1 INJECTION INTRAMUSCULAR; INTRAVENOUS at 08:47

## 2021-12-01 RX ADMIN — HEPARIN SODIUM 5000 UNITS: 5000 INJECTION INTRAVENOUS; SUBCUTANEOUS at 08:46

## 2021-12-01 NOTE — INTERVAL H&P NOTE
Update History & Physical    The patient's History and Physical of December 1, 2021 was reviewed with the patient and I examined the patient. There was no change. The surgical site was confirmed by the patient and me. Plan: The risks, benefits, expected outcome, and alternative to the recommended procedure have been discussed with the patient. Patient understands and wants to proceed with the procedure.    ASA III, Mallenpati 3    Electronically signed by Ziyad Jaffe DO on 12/1/2021 at 8:47 AM

## 2021-12-01 NOTE — H&P
Still was swelling        Cindy Chan (:  1985) is a 38 y.o. female,Established patient, here for evaluation of the following chief complaint(s):  Follow-up (3 month f/u no study per Reagan Nicholas *tyrone wants to keep this apt*)            SUBJECTIVE/OBJECTIVE:  She has a known history of of chronic venous insufficiency. She reports prominent veins on the  Left leg(s), lower extremity edema on the  Left leg(s), the veins are painful -- severity:  moderate and pain is aggravated by upright posture. She reports previous treatment includes prescription compression stockings for > 6 months, ablation left leg, left leg iliac vein stent. She has swelling in the right leg but it has never been as bad. She reports it all started in  about 2 weeks after delivery of her child. . She does not have a history of spontaneous rupture. Grandmother  of clot. Differential diagnosis for leg pain includes but is not limited to: varicose veins, peripheral vascular disease, arthritic pain, DVT, lumbar disc disease, and neurogenic pain.           Cindy Chan is a 38 y.o. female with the following history as recorded in [facility]:  Patient Active Problem List    Diagnosis Date Noted    Leg swelling 2020    Pain in both lower extremities 2020    Varicose veins of left lower extremity 2020    PCOS (polycystic ovarian syndrome) 2016    Depression (emotion) 2016    History of hepatitis C virus infection 2015    Maternal MCV (mean corpuscular volume) low 2013    Cancer screening 2013    Anxiety and depression 2013     Current Outpatient Medications   Medication Sig Dispense Refill    ELIQUIS 2.5 MG TABS tablet Take 1 tablet by mouth twice daily 60 tablet 0    lamoTRIgine (LAMICTAL) 150 MG tablet TAKE 1 TABLET BY MOUTH ONCE DAILY FOR 90 DAYS      aspirin 81 MG EC tablet Take 81 mg by mouth daily      traZODone (DESYREL) 150 MG tablet Take 150 mg by mouth nightly      amphetamine-dextroamphetamine (ADDERALL) 10 MG tablet Take 10 mg by mouth daily. Dr Babs Fleischer      LORazepam (ATIVAN) 0.5 MG tablet Take 0.5 mg by mouth nightly.  Multiple Vitamin (MULTI-VITAMINS PO) Take 1 tablet by mouth daily       escitalopram (LEXAPRO) 20 MG tablet Take 20 mg by mouth daily Take 1 in half tab daily to equal 30mg       No current facility-administered medications for this visit. Allergies: Adhesive tape  Past Medical History:   Diagnosis Date    Anxiety     Back pain     Depression     Hepatitis C     History of anemia     History of gallstones     History of kidney stones     Knee pain     left    Postpartum depression      Past Surgical History:   Procedure Laterality Date     SECTION      GASTRIC RESTRICTION SURGERY  2019    TONSILLECTOMY      VASCULAR SURGERY Left 2020    VI- L GSV RFA     Family History   Problem Relation Age of Onset    Cancer Sister         skin cancer, leukemia    Heart Disease Sister     Breast Cancer Sister     Celiac Disease Sister     Other Sister         lyme dz    Diabetes Mother     Thyroid Disease Mother     Lung Cancer Maternal Grandfather     Cancer Maternal Grandfather         throat cancer    Thyroid Disease Sister     Colon Cancer Neg Hx     Colon Polyps Neg Hx     Esophageal Cancer Neg Hx     Liver Cancer Neg Hx     Liver Disease Neg Hx     Rectal Cancer Neg Hx     Stomach Cancer Neg Hx      Social History     Tobacco Use    Smoking status: Former Smoker     Quit date: 2010     Years since quittin.3    Smokeless tobacco: Never Used   Substance Use Topics    Alcohol use: No       ROS  Eyes - no sudden vision change or amaurosis. Respiratory - no significant shortness of breath,  Cardiovascular - no chest pain or syncope. No  significant leg swelling. No claudication. Musculoskeletal - no gait disturbance  Skin - no new wound.   Neurologic -  No speech difficulty or lateralizing weakness. All other review of systems are negative. Physical Exam    /81 (Site: Right Upper Arm)   Pulse 81   Temp 97.5 °F (36.4 °C)   Resp 18   Ht 5' 4\" (1.626 m)   Wt 184 lb (83.5 kg)   SpO2 97%   BMI 31.58 kg/m²       Neck- carotid pulses 2+ to palpation with no bruit  Cardiovascular - Regular rate and rhythm. Pulmonary - effort appears normal.  No respiratory distress. Lungs - Breath sounds normal. No wheezes or rales. Extremities - Radial and brachial pulses are 2+ to palpation bilaterally. Right femoral pulse: present 2+; Right popliteal pulse: absent Right DP: absent; Right PT absent; Left femoral pulse: present 2+; Left popliteal pulse: absent; Left DP: absent; Left PT: absent No cyanosis, clubbing, or significant edema. No signs atheroembolic event. Right calf 41.5 and left 44.5 with hose compression of left  Neurologic - alert and oriented X 3. Physiologic. Face symmetric. Skin - warm, dry, and intact. no wound  Psychiatric - mood, affect, and behavior appear normal.  Judgment and thought processes appear normal.    Risk factors for atherosclerosis of all vascular beds have been reviewed with the patient including:  Family history, tobacco abuse in all forms, elevated cholesterol, hyperlipidemia, and diabetes. Reviewed previous studies including: venous scan  Individual images were reviewed. I agree with the findings  Results were discussed with the patient. ASSESSMENT/PLAN:  1. Venous insufficiency of both lower extremities  -     VL Extremity Venous Bilateral; Future  2. Leg pain, left  -     VL Extremity Venous Bilateral; Future  3. Leg swelling  -     VL Extremity Venous Bilateral; Future    1. Venous insufficiency of both lower extremities    2. Leg pain, left    3.  Leg swelling     chronic illness with exacerbation or progression    Support hose  20-30 mmHg compression  Start/Continue ASA EC 81 mg daily  Leg elevation  Good moisturization  Good skin care  Follow up iafter venous scan for reflux to look at iliac stent  Diet   excercise   Discussed management of venous scan which includes:  continue eliquis to reduce risk of venous thrombosis  Recommend no smoking - discussed the effect tobacco has on illness; Addendum - venous scan -   Right Side: The greater saphenous vein exhibits reflux lasting for a    maximum of 6598 milliseconds in the low thigh. There is no evidence of    deep venous thrombosis in the segments insonated. There is 1430 seconds of    deep vein reflux.   Hollace Gondola is 6598 seconds of short saphenous vein reflux.    Greater saphenous vein ablated. It reopens distally, also reflux in the    anterior saphenous branch.        Left iliac vein stent appears patent    Left Side: The greater saphenous vein exhibits reflux lasting for a    maximum of 3051 milliseconds in the saphenofemoral junction. There is no    evidence of deep venous thrombosis in the segments insonated. There is    1254 seconds of deep vein reflux.   Hollace Gondola is 3777 seconds of short saphenous vein reflux     Individual images were reviewed. Results were reviewed with the patient. Options were discussed with the patient including continued medical management versus proceeding with venography and potential intervention for persistent leg swelling. Patient has opted to proceed with venography. Risks have been discussed with the patient including but not limited to MI, death, CVA, bleed, nerve injury, infection, contrast nephropathy, possible need for dialysis, and need for further surgery. An electronic signature was used to authenticate this note.     --ASHLY Delgado

## 2021-12-01 NOTE — OP NOTE
Patient Name: Susan Bruno   YOB: 1985   MRN: [id number]     Procedure Date: 12/1/2021     Pre Op Diagnosis: left leg edema, left iliac stent    Post Op Diagnosis: same    Procedure: left iliac venogram    Anesthesia: Local with Moderate Sedation    Estimated Blood Loss: Less than 50 ml    Complications: None    Implants: none    Procedure Details: Patient was brought to the angiogram suite and placed in the prone position. Her left groin were prepped and draped in sterile fashion. Under continuous ultrasound guidance left common femoral vein was accessed using standard micropuncture technique. Micropuncture sheath was placed and that was exchanged to the 8 [place] Alyssa access sheath. Venogram was performed of the left IVC iliac system in 2 projections AP and oblique. Left iliac stent appeared patent. J-wire was placed and intravascular ultrasound was performed of the distal IVC, left common and external iliac veins. Stent was widely patent with good wall apposition. Device were removed and manual pressure held for hemostasis. Patient tolerated procedure well was transferred to PACU in stable condition. Plan is to discuss lesser saphenous vein ablation of the left leg. Findings: Patent distal IVC and left iliac stent.     Disposition:aroused from sedation, and taken to the recovery room in a stable condition    Bobo Aden DO  12/1/2021 9:10 AM

## 2021-12-01 NOTE — PROGRESS NOTES
Discharge instructions reviewed with patient and patient states understanding. Left groin site c/d/i.  Patient discharged from cath holding with all belongings and AVS.  Electronically signed by Melissa Knox RN on 12/1/2021 at 11:54 AM

## 2022-01-12 ENCOUNTER — OFFICE VISIT (OUTPATIENT)
Dept: VASCULAR SURGERY | Age: 37
End: 2022-01-12
Payer: MEDICAID

## 2022-01-12 VITALS
TEMPERATURE: 96.2 F | HEIGHT: 64 IN | BODY MASS INDEX: 32.27 KG/M2 | WEIGHT: 189 LBS | SYSTOLIC BLOOD PRESSURE: 120 MMHG | HEART RATE: 85 BPM | RESPIRATION RATE: 20 BRPM | OXYGEN SATURATION: 98 % | DIASTOLIC BLOOD PRESSURE: 80 MMHG

## 2022-01-12 DIAGNOSIS — I87.2 VENOUS INSUFFICIENCY: ICD-10-CM

## 2022-01-12 DIAGNOSIS — M79.89 LEG SWELLING: ICD-10-CM

## 2022-01-12 DIAGNOSIS — M79.605 LEG PAIN, LEFT: Primary | ICD-10-CM

## 2022-01-12 DIAGNOSIS — I83.812 VARICOSE VEINS OF LEFT LOWER EXTREMITY WITH PAIN: ICD-10-CM

## 2022-01-12 PROCEDURE — 99213 OFFICE O/P EST LOW 20 MIN: CPT | Performed by: NURSE PRACTITIONER

## 2022-01-25 ENCOUNTER — TELEPHONE (OUTPATIENT)
Dept: OBGYN CLINIC | Age: 37
End: 2022-01-25

## 2022-01-25 NOTE — TELEPHONE ENCOUNTER
Pt call was sent to me through pre-service. Pt states she had a private US today and they couldn't see anything showing she was pregnant. I told pt her HCG level is still too early for an US and she needs to repeat HCG tomorrow, which will be 48 hours. I let pt know Sailaja Martinez is out of office today, I will forward this message to her and her MA to look for her HCG results tomorrow. Pt states she is very concerned, since she is IVF.

## 2022-03-11 NOTE — PATIENT INSTRUCTIONS
Patient Education        Pap Test: Care Instructions  Overview     The Pap test (also called a Pap smear) is a screening test for cancer of the cervix, which is the lower part of the uterus that opens into the vagina. The test can help your doctor find early changes in the cells that could lead to cancer. The sample of cells taken during your test has been sent to a lab so that an expert can look at the cells. It usually takes a week or two to get the results back. Follow-up care is a key part of your treatment and safety. Be sure to make and go to all appointments, and call your doctor if you are having problems. It's also a good idea to know your test results and keep a list of the medicines you take. What do the results mean? · A normal result means that the test did not find any abnormal cells in the sample. · An abnormal result can mean many things. Most of these are not cancer. The results of your test may be abnormal because:  ? You have an infection of the vagina or cervix, such as a yeast infection. ? You have an IUD (intrauterine device for birth control). ? You have low estrogen levels after menopause that are causing the cells to change. ? You have cell changes that may be a sign of precancer or cancer. The results are ranked based on how serious the changes might be. There are many other reasons why you might not get a normal result. If the results were abnormal, you may need to get another test within a few weeks or months. If the results show changes that could be a sign of cancer, you may need a test called a colposcopy, which provides a more complete view of the cervix. Sometimes the lab cannot use the sample because it does not contain enough cells or was not preserved well. If so, you may need to have the test again. This is not common, but it does happen from time to time. When should you call for help?   Watch closely for changes in your health, and be sure to contact your doctor if:    · You have vaginal bleeding or pain for more than 2 days after the test. It is normal to have a small amount of bleeding for a day or two after the test.   Where can you learn more? Go to https://ApoVaxflora.[employer] org and sign in to your Keek account. Enter Q359 in the [facility] box to learn more about \"Pap Test: Care Instructions. \"     If you do not have an account, please click on the \"Sign Up Now\" link. Current as of: September 8, 2021               Content Version: 13.1  © 5853-5443 [employer]. Care instructions adapted under license by [facility] ([facility]). If you have questions about a medical condition or this instruction, always ask your healthcare professional. [address] any warranty or liability for your use of this information. Patient Education        Breast Self-Exam: Care Instructions  Your Care Instructions     A breast self-exam is when you check your breasts for lumps or changes. This regular exam helps you learn how your breasts normally look and feel. Most breast problems or changes are not because of cancer. Breast self-exam is not a substitute for a mammogram. Having regular breast exams by your doctor and regular mammograms improve your chances of finding any problems with your breasts. Some women set a time each month to do a step-by-step breast self-exam. Other women like a less formal system. They might look at their breasts as they brush their teeth, or feel their breasts once in a while in the shower. If you notice a change in your breast, tell your doctor. Follow-up care is a key part of your treatment and safety. Be sure to make and go to all appointments, and call your doctor if you are having problems. It's also a good idea to know your test results and keep a list of the medicines you take.   How do you do a breast self-exam?  · The best time to examine your breasts is usually one week after your menstrual period begins. Your breasts should not be tender then. If you do not have periods, you might do your exam on a day of the month that is easy to remember. · To examine your breasts:  ? Remove all your clothes above the waist and lie down. When you are lying down, your breast tissue spreads evenly over your chest wall, which makes it easier to feel all your breast tissue. ? Use the padsnot the fingertipsof the 3 middle fingers of your left hand to check your right breast. Move your fingers slowly in small coin-sized circles that overlap. ? Use three levels of pressure to feel of all your breast tissue. Use light pressure to feel the tissue close to the skin surface. Use medium pressure to feel a little deeper. Use firm pressure to feel your tissue close to your breastbone and ribs. Use each pressure level to feel your breast tissue before moving on to the next spot. ? Check your entire breast, moving up and down as if following a strip from the collarbone to the bra line, and from the armpit to the ribs. Repeat until you have covered the entire breast.  ? Repeat this procedure for your left breast, using the pads of the 3 middle fingers of your right hand. · To examine your breasts while in the shower:  ? Place one arm over your head and lightly soap your breast on that side. ? Using the pads of your fingers, gently move your hand over your breast (in the strip pattern described above), feeling carefully for any lumps or changes. ? Repeat for the other breast.  · Have your doctor inspect anything you notice to see if you need further testing. Where can you learn more? Go to https://Mumartflora.UeeeU.com. org and sign in to your PlaceIQ account. Enter P148 in the [employer] box to learn more about \"Breast Self-Exam: Care Instructions. \"     If you do not have an account, please click on the \"Sign Up Now\" link.   Current as of: September 8, 2021               Content Version: 13.1  © 2006-2021 Healthwise, "[employer]. Care instructions adapted under license by [facility] ([facility]). If you have questions about a medical condition or this instruction, always ask your healthcare professional. [address] any warranty or liability for your use of this information. Patient Education        Body Mass Index: Care Instructions  Your Care Instructions     Body mass index (BMI) can help you see if your weight is raising your risk for health problems. It uses a formula to compare how much you weigh with how tall you are. · A BMI lower than 18.5 is considered underweight. · A BMI between 18.5 and 24.9 is considered healthy. · A BMI between 25 and 29.9 is considered overweight. A BMI of 30 or higher is considered obese. If your BMI is in the normal range, it means that you have a lower risk for weight-related health problems. If your BMI is in the overweight or obese range, you may be at increased risk for weight-related health problems, such as high blood pressure, heart disease, stroke, arthritis or joint pain, and diabetes. If your BMI is in the underweight range, you may be at increased risk for health problems such as fatigue, lower protection (immunity) against illness, muscle loss, bone loss, hair loss, and hormone problems. BMI is just one measure of your risk for weight-related health problems. You may be at higher risk for health problems if you are not active, you eat an unhealthy diet, or you drink too much alcohol or use tobacco products. Follow-up care is a key part of your treatment and safety. Be sure to make and go to all appointments, and call your doctor if you are having problems. It's also a good idea to know your test results and keep a list of the medicines you take. How can you care for yourself at home? · Practice healthy eating habits. This includes eating plenty of fruits, vegetables, whole grains, lean protein, and low-fat dairy.   · If your doctor recommends it, get more exercise. Walking is a good choice. Bit by bit, increase the amount you walk every day. Try for at least 30 minutes on most days of the week. · Do not smoke. Smoking can increase your risk for health problems. If you need help quitting, talk to your doctor about stop-smoking programs and medicines. These can increase your chances of quitting for good. · Limit alcohol to 2 drinks a day for men and 1 drink a day for women. Too much alcohol can cause health problems. If you have a BMI higher than 25  · Your doctor may do other tests to check your risk for weight-related health problems. This may include measuring the distance around your waist. A waist measurement of more than 40 inches in men or 35 inches in women can increase the risk of weight-related health problems. · Talk with your doctor about steps you can take to stay healthy or improve your health. You may need to make lifestyle changes to lose weight and stay healthy, such as changing your diet and getting regular exercise. If you have a BMI lower than 18.5  · Your doctor may do other tests to check your risk for health problems. · Talk with your doctor about steps you can take to stay healthy or improve your health. You may need to make lifestyle changes to gain or maintain weight and stay healthy, such as getting more healthy foods in your diet and doing exercises to build muscle. Where can you learn more? Go to https://anayeli.healthLoyaltyLion. org and sign in to your [employer]ec account. Enter S176 in the [employer] box to learn more about \"Body Mass Index: Care Instructions. \"     If you do not have an account, please click on the \"Sign Up Now\" link. Current as of: March 17, 2021               Content Version: 13.1  © 8998-9068 Healthwise, Incorporated. Care instructions adapted under license by [facility] ([facility]).  If you have questions about a medical condition or this instruction, always ask your healthcare professional. [address] any warranty or liability for your use of this information. Patient Education        A Healthy Lifestyle: Care Instructions  Your Care Instructions     A healthy lifestyle can help you feel good, stay at a healthy weight, and have plenty of energy for both work and play. A healthy lifestyle is something you can share with your whole family. A healthy lifestyle also can lower your risk for serious health problems, such as high blood pressure, heart disease, and diabetes. You can follow a few steps listed below to improve your health and the health of your family. Follow-up care is a key part of your treatment and safety. Be sure to make and go to all appointments, and call your doctor if you are having problems. It's also a good idea to know your test results and keep a list of the medicines you take. How can you care for yourself at home? · Do not eat too much sugar, fat, or fast foods. You can still have dessert and treats now and then. The goal is moderation. · Start small to improve your eating habits. Pay attention to portion sizes, drink less juice and soda pop, and eat more fruits and vegetables. ? Eat a healthy amount of food. A 3-ounce serving of meat, for example, is about the size of a deck of cards. Fill the rest of your plate with vegetables and whole grains. ? Limit the amount of soda and sports drinks you have every day. Drink more water when you are thirsty. ? Eat plenty of fruits and vegetables every day. Have an apple or some carrot sticks as an afternoon snack instead of a candy bar. Try to have fruits and/or vegetables at every meal.  · Make exercise part of your daily routine. You may want to start with simple activities, such as walking, bicycling, or slow swimming. Try to be active 30 to 60 minutes every day. You do not need to do all 30 to 60 minutes all at once.  For example, you can exercise 3 times a day for 10 or 20 minutes. Moderate exercise is safe for most people, but it is always a good idea to talk to your doctor before starting an exercise program.  · Keep moving. [place] the lawn, work in the garden, or [employer]. Take the stairs instead of the elevator at work. · If you smoke, quit. People who smoke have an increased risk for heart attack, stroke, cancer, and other lung illnesses. Quitting is hard, but there are ways to boost your chance of quitting tobacco for good. ? Use nicotine gum, patches, or lozenges. ? Ask your doctor about stop-smoking programs and medicines. ? Keep trying. In addition to reducing your risk of diseases in the future, you will notice some benefits soon after you stop using tobacco. If you have shortness of breath or asthma symptoms, they will likely get better within a few weeks after you quit. · Limit how much alcohol you drink. Moderate amounts of alcohol (up to 2 drinks a day for men, 1 drink a day for women) are okay. But drinking too much can lead to liver problems, high blood pressure, and other health problems. Family health  If you have a family, there are many things you can do together to improve your health. · Eat meals together as a family as often as possible. · Eat healthy foods. This includes fruits, vegetables, lean meats and dairy, and whole grains. · Include your family in your fitness plan. Most people think of activities such as jogging or tennis as the way to fitness, but there are many ways you and your family can be more active. Anything that makes you breathe hard and gets your heart pumping is exercise. Here are some tips:  ? Walk to do errands or to take your child to school or the bus.  ? Go for a family bike ride after dinner instead of watching TV. Where can you learn more? Go to https://anayeli.health-partners. org and sign in to your [employer] account.  Enter X822 in the [facility] box to learn more about \"A Healthy Lifestyle: Care Instructions. \"     If you do not have an account, please click on the \"Sign Up Now\" link. Current as of: June 16, 2021               Content Version: 13.1  © 4763-9100 Healthwise, Incorporated. Care instructions adapted under license by [facility] ([facility]). If you have questions about a medical condition or this instruction, always ask your healthcare professional. [address] any warranty or liability for your use of this information.

## 2022-03-14 DIAGNOSIS — Z01.818 PRE-OP TESTING: ICD-10-CM

## 2022-03-14 DIAGNOSIS — M79.605 LEFT LEG PAIN: ICD-10-CM

## 2022-03-14 DIAGNOSIS — Z11.52 ENCOUNTER FOR SCREENING FOR COVID-19: Primary | ICD-10-CM

## 2022-03-14 DIAGNOSIS — M79.89 LEG SWELLING: ICD-10-CM

## 2022-03-15 ENCOUNTER — OFFICE VISIT (OUTPATIENT)
Dept: OBGYN CLINIC | Age: 37
End: 2022-03-15
Payer: MEDICAID

## 2022-03-15 VITALS
DIASTOLIC BLOOD PRESSURE: 87 MMHG | WEIGHT: 195 LBS | SYSTOLIC BLOOD PRESSURE: 131 MMHG | HEART RATE: 82 BPM | BODY MASS INDEX: 33.29 KG/M2 | HEIGHT: 64 IN

## 2022-03-15 DIAGNOSIS — Z01.419 ENCOUNTER FOR WELL WOMAN EXAM WITH ROUTINE GYNECOLOGICAL EXAM: Primary | ICD-10-CM

## 2022-03-15 DIAGNOSIS — R53.83 FATIGUE, UNSPECIFIED TYPE: ICD-10-CM

## 2022-03-15 DIAGNOSIS — Z76.89 ENCOUNTER TO ESTABLISH CARE: ICD-10-CM

## 2022-03-15 DIAGNOSIS — Z87.42 HISTORY OF PCOS: ICD-10-CM

## 2022-03-15 DIAGNOSIS — Z80.3 FAMILY HISTORY OF BREAST CANCER: ICD-10-CM

## 2022-03-15 DIAGNOSIS — R61 NIGHT SWEATS: ICD-10-CM

## 2022-03-15 DIAGNOSIS — Z12.4 SCREENING FOR CERVICAL CANCER: ICD-10-CM

## 2022-03-15 DIAGNOSIS — Z01.818 PRE-OP TESTING: ICD-10-CM

## 2022-03-15 DIAGNOSIS — Z11.51 SCREENING FOR HPV (HUMAN PAPILLOMAVIRUS): ICD-10-CM

## 2022-03-15 DIAGNOSIS — Z12.39 ENCOUNTER FOR SCREENING BREAST EXAMINATION AND DISCUSSION OF BREAST SELF EXAMINATION: ICD-10-CM

## 2022-03-15 DIAGNOSIS — Z12.31 ENCOUNTER FOR SCREENING MAMMOGRAM FOR MALIGNANT NEOPLASM OF BREAST: ICD-10-CM

## 2022-03-15 DIAGNOSIS — Z11.52 ENCOUNTER FOR SCREENING FOR COVID-19: ICD-10-CM

## 2022-03-15 LAB
HBA1C MFR BLD: 5.3 % (ref 4–6)
SARS-COV-2, PCR: NOT DETECTED
T4 TOTAL: 7.5 UG/DL (ref 4.5–11.7)
TSH SERPL DL<=0.05 MIU/L-ACNC: 0.55 UIU/ML (ref 0.27–4.2)

## 2022-03-15 PROCEDURE — 99385 PREV VISIT NEW AGE 18-39: CPT | Performed by: ADVANCED PRACTICE MIDWIFE

## 2022-03-15 PROCEDURE — 99213 OFFICE O/P EST LOW 20 MIN: CPT | Performed by: ADVANCED PRACTICE MIDWIFE

## 2022-03-15 ASSESSMENT — ENCOUNTER SYMPTOMS
RESPIRATORY NEGATIVE: 1
GASTROINTESTINAL NEGATIVE: 1
EYES NEGATIVE: 1
ALLERGIC/IMMUNOLOGIC NEGATIVE: 1

## 2022-03-15 NOTE — PROGRESS NOTES
Pt presents today for pap smear and breast exam.  She also complains of night sweats. Doesn't know if it's from hormones or sleep apnea. It triggers things sometimes. When she wears the mask, she doesn't sweat, unless she is so bad knocked out. Last mammogram: \", negative\"  Last pap smear: \"0036-10, negative\"  Sexually active: yes  Sexual preference: male   Contraception: ParaGard IUD? Had it put in in [place] in 2012 and it doesn't have hormones. : 2  Para: 1  AB: 1  Last bone density: never   Last colonoscopy: never  Menarche: 4years old  LMP: 2022 approx. Menses: regular, heavy. She has PCOS, wants it rechecked.

## 2022-03-15 NOTE — PROGRESS NOTES
[facility]  CNM Office Note    Tasha Lane is a 43 y.o. female who presents today for her medical conditions/ complaints as noted below. Chief Complaint   Patient presents with    New Patient     patient has NEVER seen BJ before, but everyone praises her. Her friend Tate Thompson sees BJ and knows Doug Amezcua. [facility] Establish Care    Gynecologic Exam     patient presents today for a pap smear and breast exam.     Other     patient is requesting hormones labs d/t night sweats. see whole note         HPI  Juanjo Mills presents for annual gyn exam and to establish care. She has concerns regarding night sweats and also struggles with chronic left leg swelling. Night sweats have been on and off for several months and are not associated with menses. No changes in medications/bedding. She has not been able to determine source. Menses is regular. Left leg edema started after birth of her child - she is being treated by PCP for this. Takes blood thinners to prevent blood clots. She has paragard for contraception. Hx of sister with breast cancer in her 35s. Patient Active Problem List   Diagnosis    Maternal MCV (mean corpuscular volume) low    Cancer screening    Anxiety and depression    History of hepatitis C virus infection    PCOS (polycystic ovarian syndrome)    Depression (emotion)    Leg swelling    Pain in both lower extremities    Varicose veins of left lower extremity       Patient's last menstrual period was 2022 (approximate).       Past Medical History:   Diagnosis Date    Anxiety     Back pain     Depression     Hepatitis C     History of anemia     History of gallstones     History of kidney stones     Knee pain     left    Postpartum depression      Past Surgical History:   Procedure Laterality Date     SECTION      GASTRIC RESTRICTION SURGERY  2019    TONSILLECTOMY      VASCULAR SURGERY Left 2020    VI- L GSV RFA     Family History Problem Relation Age of Onset    Diabetes Mother     Thyroid Disease Mother         had part of her thyroid removed    Cancer Sister         skin cancer, leukemia    Heart Disease Sister     Breast Cancer Sister     Celiac Disease Sister     Other Sister         lyme dz    Thyroid Disease Sister     Lung Cancer Maternal Grandfather     Cancer Maternal Grandfather         throat cancer    Colon Cancer Neg Hx     Colon Polyps Neg Hx     Esophageal Cancer Neg Hx     Liver Cancer Neg Hx     Liver Disease Neg Hx     Rectal Cancer Neg Hx     Stomach Cancer Neg Hx      Social History     Tobacco Use    Smoking status: Former Smoker     Quit date: 2010     Years since quittin.8    Smokeless tobacco: Never Used   Substance Use Topics    Alcohol use: No       Current Outpatient Medications   Medication Sig Dispense Refill    apixaban (ELIQUIS) 2.5 MG TABS tablet Take 1 tablet by mouth twice daily 60 tablet 1    lamoTRIgine (LAMICTAL) 150 MG tablet 75 mg       aspirin 81 MG EC tablet Take 81 mg by mouth daily      traZODone (DESYREL) 150 MG tablet Take 150 mg by mouth nightly      amphetamine-dextroamphetamine (ADDERALL) 10 MG tablet Take 10 mg by mouth daily. Dr Marlon Gale Multiple Vitamin (MULTI-VITAMINS PO) Take 1 tablet by mouth daily       escitalopram (LEXAPRO) 20 MG tablet Take 20 mg by mouth daily Take 1 in half tab daily to equal 25mg       No current facility-administered medications for this visit. Allergies   Allergen Reactions    Adhesive Tape      Vitals:    03/15/22 1453   BP: 131/87   Site: Left Upper Arm   Position: Sitting   Cuff Size: Medium Adult   Pulse: 82   Weight: 195 lb (88.5 kg)   Height: 5' 4\" (1.626 m)     Body mass index is 33.47 kg/m². Review of Systems   Constitutional: Negative. HENT: Negative. Eyes: Negative. Respiratory: Negative. Cardiovascular: Positive for leg swelling (left lower leg). Gastrointestinal: Negative.     Endocrine: Positive for heat intolerance (night sweats). Genitourinary: Negative. Musculoskeletal: Negative. Skin: Negative. Allergic/Immunologic: Negative. Neurological: Negative. Hematological: Negative. Psychiatric/Behavioral: Positive for sleep disturbance (night sweats). Physical Exam  Constitutional:       Appearance: She is well-developed. HENT:      Head: Normocephalic and atraumatic. Eyes:      Conjunctiva/sclera: Conjunctivae normal.      Pupils: Pupils are equal, round, and reactive to light. Neck:      Thyroid: No thyromegaly. Trachea: No tracheal deviation. Cardiovascular:      Rate and Rhythm: Normal rate and regular rhythm. Heart sounds: Normal heart sounds. No murmur heard. Pulmonary:      Effort: Pulmonary effort is normal. No respiratory distress. Breath sounds: Normal breath sounds. Chest:      Comments: Breasts symmetrical without tenderness, masses, or nipple discharge. Nipples everted bilaterally. Abdominal:      General: There is no distension. Palpations: Abdomen is soft. Tenderness: There is no abdominal tenderness. There is no guarding. Genitourinary:     General: Normal vulva. Exam position: Lithotomy position. Labia:         Right: No rash or lesion. Left: No rash or lesion. Vagina: Normal. No erythema or lesions. Cervix: Normal.      Uterus: Normal. Not tender. Adnexa: Right adnexa normal and left adnexa normal.        Right: No mass, tenderness or fullness. Left: No mass, tenderness or fullness. Comments: IUD strings visualized  Musculoskeletal:         General: Swelling (left lower leg) present. Normal range of motion. Cervical back: Normal range of motion and neck supple. Skin:     General: Skin is warm and dry. Capillary Refill: Capillary refill takes less than 2 seconds. Neurological:      Mental Status: She is alert and oriented to person, place, and time. Psychiatric:         Behavior: Behavior normal.         Thought Content: Thought content normal.         Judgment: Judgment normal.          Diagnosis Orders   1. Encounter for well woman exam with routine gynecological exam  PAP SMEAR   2. Encounter to establish care     3. Screening for cervical cancer  PAP SMEAR   4. Screening for HPV (human papillomavirus)  Human papillomavirus (HPV) DNA probe thin prep high risk   5. Encounter for screening breast examination and discussion of breast self examination  PAP SMEAR   6. Night sweats  Hemoglobin A1C   7. Family history of breast cancer  DAYSI DIGITAL SCREEN W OR WO CAD BILATERAL   8. History of PCOS  T4    TSH    Hemoglobin A1C   9. Encounter for screening mammogram for malignant neoplasm of breast  DAYSI DIGITAL SCREEN W OR WO CAD BILATERAL   10. Fatigue, unspecified type  Hemoglobin A1C       MEDICATIONS:  No orders of the defined types were placed in this encounter. ORDERS:  Orders Placed This Encounter   Procedures    DAYSI DIGITAL SCREEN W OR WO CAD BILATERAL    PAP SMEAR    Human papillomavirus (HPV) DNA probe thin prep high risk    T4    TSH    Hemoglobin A1C       PLAN:  1. WWE - PAP w HPV collected. Mammogram ordered. SBE reviewed and CBE performed. 2. Paragard - will encouraged SO to get vasectomy. 3. Night sweats - labs today, f/u with PCP  4. Hormones - I do not believe this is hormone related. 5. Empower collected.

## 2022-03-17 ENCOUNTER — PREP FOR PROCEDURE (OUTPATIENT)
Dept: VASCULAR SURGERY | Age: 37
End: 2022-03-17

## 2022-03-17 ENCOUNTER — TELEMEDICINE (OUTPATIENT)
Dept: VASCULAR SURGERY | Age: 37
End: 2022-03-17
Payer: MEDICAID

## 2022-03-17 DIAGNOSIS — M79.89 LEG SWELLING: Primary | ICD-10-CM

## 2022-03-17 DIAGNOSIS — Z01.419 ENCOUNTER FOR WELL WOMAN EXAM WITH ROUTINE GYNECOLOGICAL EXAM: Primary | ICD-10-CM

## 2022-03-17 DIAGNOSIS — I87.2 VENOUS INSUFFICIENCY: ICD-10-CM

## 2022-03-17 DIAGNOSIS — M79.605 LEFT LEG PAIN: ICD-10-CM

## 2022-03-17 PROCEDURE — 99213 OFFICE O/P EST LOW 20 MIN: CPT | Performed by: NURSE PRACTITIONER

## 2022-03-17 RX ORDER — SODIUM CHLORIDE 0.9 % (FLUSH) 0.9 %
10 SYRINGE (ML) INJECTION PRN
Status: CANCELLED | OUTPATIENT
Start: 2022-03-17

## 2022-03-17 RX ORDER — CLONIDINE HYDROCHLORIDE 0.1 MG/1
0.1 TABLET ORAL PRN
Status: CANCELLED | OUTPATIENT
Start: 2022-03-17

## 2022-03-17 RX ORDER — SODIUM CHLORIDE 9 MG/ML
25 INJECTION, SOLUTION INTRAVENOUS PRN
Status: CANCELLED | OUTPATIENT
Start: 2022-03-17

## 2022-03-17 NOTE — H&P
David Alonzo (:  1985) is a 43 y.o. female,Established patient, here for evaluation of the following chief complaint(s): Follow-up          SUBJECTIVE/OBJECTIVE:  She has a known history of of varicose veins and chronic venous insufficiency. She reports prominent veins on the  Left leg(s), spider veins on the  Left leg(s), lower extremity edema on the  Left leg(s), the veins are painful -- severity:  moderate and pain is aggravated by upright posture. She reports previous treatment includes prescription compression stockings for > 6 months Ablation left GSV as well as left iliac vein stent. She does not have a history of spontaneous rupture. She is now scheduled for a left LSV ablation. Differential diagnosis for leg pain includes but is not limited to: varicose veins, peripheral vascular disease, arthritic pain, DVT, lumbar disc disease, and neurogenic pain. David Alonzo is a 43 y.o. female with the following history as recorded in [facility]:  Patient Active Problem List    Diagnosis Date Noted    Leg swelling 2020    Pain in both lower extremities 2020    Varicose veins of left lower extremity 2020    PCOS (polycystic ovarian syndrome) 2016    Depression (emotion) 2016    History of hepatitis C virus infection 2015    Maternal MCV (mean corpuscular volume) low 2013    Cancer screening 2013    Anxiety and depression 2013     Current Outpatient Medications   Medication Sig Dispense Refill    apixaban (ELIQUIS) 2.5 MG TABS tablet Take 1 tablet by mouth twice daily 60 tablet 1    lamoTRIgine (LAMICTAL) 150 MG tablet 75 mg       aspirin 81 MG EC tablet Take 81 mg by mouth daily      traZODone (DESYREL) 150 MG tablet Take 150 mg by mouth nightly      amphetamine-dextroamphetamine (ADDERALL) 10 MG tablet Take 10 mg by mouth daily.  Dr Ava Paulson Multiple Vitamin (MULTI-VITAMINS PO) Take 1 tablet by mouth daily       escitalopram (LEXAPRO) 20 MG tablet Take 20 mg by mouth daily Take 1 in half tab daily to equal 25mg       No current facility-administered medications for this visit. Allergies: Adhesive tape  Past Medical History:   Diagnosis Date    Anxiety     Back pain     Depression     Hepatitis C     History of anemia     History of gallstones     History of kidney stones     Knee pain     left    Postpartum depression      Past Surgical History:   Procedure Laterality Date     SECTION      GASTRIC RESTRICTION SURGERY  2019    TONSILLECTOMY      VASCULAR SURGERY Left 2020    VI- L GSV RFA     Family History   Problem Relation Age of Onset    Diabetes Mother     Thyroid Disease Mother         had part of her thyroid removed    Cancer Sister         skin cancer, leukemia    Heart Disease Sister     Breast Cancer Sister     Celiac Disease Sister     Other Sister         lyme dz    Thyroid Disease Sister     Lung Cancer Maternal Grandfather     Cancer Maternal Grandfather         throat cancer    Colon Cancer Neg Hx     Colon Polyps Neg Hx     Esophageal Cancer Neg Hx     Liver Cancer Neg Hx     Liver Disease Neg Hx     Rectal Cancer Neg Hx     Stomach Cancer Neg Hx      Social History     Tobacco Use    Smoking status: Former Smoker     Quit date: 2010     Years since quittin.8    Smokeless tobacco: Never Used   Substance Use Topics    Alcohol use: No       ROS  Eyes - no sudden vision change or amaurosis. Respiratory - no significant shortness of breath,  Cardiovascular - no chest pain or syncope. No  significant leg swelling. No claudication. Musculoskeletal - no gait disturbance  Skin - no new wound. Neurologic -  No speech difficulty or lateralizing weakness. All other review of systems are negative. No flowsheet data found.        Physical Exam    Due to this being a TeleHealth encounter, evaluation of the following organ systems is limited: Vitals/Constitutional/EENT/Resp/CV/GI//MS/Neuro/Skin/Heme-Lymph-Imm. Constitutional - well developed, well nourished. No diaphoresis or acute distress. Neck- ROM appears normal  Extremities -No cyanosis, clubbing, has edema. No signs atheroembolic event. Ha varicose vins  Pulmonary - effort appears normal.  No respiratory distress. No accessory muscle use  Neurologic - alert and oriented X 3. Neurologic physiologic  Skin - intact. No rash, erythema, or pallor. Psychiatric - mood, affect, and behavior appear normal.  Judgment and thought processes appear normal.    Risk factors for atherosclerosis of all vascular beds have been reviewed with the patient including:  Family history, tobacco abuse in all forms, elevated cholesterol, hyperlipidemia, and diabetes. Venous ultrasound results:  october  Right Side: The greater saphenous vein exhibits reflux lasting for a    maximum of 6598 milliseconds in the low thigh. There is no evidence of    deep venous thrombosis in the segments insonated. There is 1430 seconds of    deep vein reflux.   Michela Cambric is 6598 seconds of short saphenous vein reflux.    Greater saphenous vein ablated. It reopens distally, also reflux in the    anterior saphenous branch.        Left iliac vein stent appears patent    Left Side: The greater saphenous vein exhibits reflux lasting for a    maximum of 3051 milliseconds in the saphenofemoral junction. There is no    evidence of deep venous thrombosis in the segments insonated. There is    1254 seconds of deep vein reflux.   Michela Cambric is 3777 seconds of short saphenous vein reflux     Individual images were reviewed. Results were reviewed with the patient. Individual films reviewed: Yes. Test results were reviewed with the patient       Options have been discussed with the patient including continued medical management vs. proceeding with left lsv ablation. Patient has opted to proceed with this.   Risks have been discussed with the patient including but not limited to MI, death, CVA, bleed, nerve injury, dvt, hyperpigmentation, and infection. ASSESSMENT/PLAN:  1. Leg swelling  2. Left leg pain  3. Venous insufficiency    1. Leg swelling    2. Left leg pain    3. Venous insufficiency       Proceed with left LSV ablation      Luz Maria Christian, was evaluated through a synchronous (real-time) audio-video  encounter. The patient (or guardian if applicable) is aware that this is a billable  service, which includes applicable co-pays. This Virtual Visit was conducted with  patient's (and/or legal guardian's) consent. The visit was conducted pursuant to  the emergency declaration under the [address] [place] [address] authority and the [employer] and  [employer] General Act. Patient identification was verified,  and a caregiver was present when appropriate. The patient was located in a  state where the provider was licensed to provide care. Patient identification was verified at the start of the visit: Yes      Services were provided through a video synchronous discussion virtually to substitute for in-person clinic visit. Patient and provider were located at their individual homes. An electronic signature was used to authenticate this note.     --ASHLY Hagen

## 2022-03-17 NOTE — H&P (VIEW-ONLY)
Krysta Villatoro (:  1985) is a 43 y.o. female,Established patient, here for evaluation of the following chief complaint(s): Follow-up          SUBJECTIVE/OBJECTIVE:  She has a known history of of varicose veins and chronic venous insufficiency. She reports prominent veins on the  Left leg(s), spider veins on the  Left leg(s), lower extremity edema on the  Left leg(s), the veins are painful -- severity:  moderate and pain is aggravated by upright posture. She reports previous treatment includes prescription compression stockings for > 6 months Ablation left GSV as well as left iliac vein stent. She does not have a history of spontaneous rupture. She is now scheduled for a left LSV ablation. Differential diagnosis for leg pain includes but is not limited to: varicose veins, peripheral vascular disease, arthritic pain, DVT, lumbar disc disease, and neurogenic pain. Krysta Villatoro is a 43 y.o. female with the following history as [facility] in [facility]:  Patient Active Problem List    Diagnosis Date Noted    Leg swelling 2020    Pain in both lower extremities 2020    Varicose veins of left lower extremity 2020    PCOS (polycystic ovarian syndrome) 2016    Depression (emotion) 2016    History of hepatitis C virus infection 2015    Maternal MCV (mean corpuscular volume) low 2013    Cancer screening 2013    Anxiety and depression 2013     Current Outpatient Medications   Medication Sig Dispense Refill    apixaban (ELIQUIS) 2.5 MG TABS tablet Take 1 tablet by mouth twice daily 60 tablet 1    lamoTRIgine (LAMICTAL) 150 MG tablet 75 mg       aspirin 81 MG EC tablet Take 81 mg by mouth daily      traZODone (DESYREL) 150 MG tablet Take 150 mg by mouth nightly      amphetamine-dextroamphetamine (ADDERALL) 10 MG tablet Take 10 mg by mouth daily.  Dr Edelmira Garcia Multiple Vitamin (MULTI-VITAMINS PO) Take 1 tablet by mouth daily       escitalopram (LEXAPRO) 20 MG tablet Take 20 mg by mouth daily Take 1 in half tab daily to equal 25mg       No current facility-administered medications for this visit. Allergies: Adhesive tape  Past Medical History:   Diagnosis Date    Anxiety     Back pain     Depression     Hepatitis C     History of anemia     History of gallstones     History of kidney stones     Knee pain     left    Postpartum depression      Past Surgical History:   Procedure Laterality Date     SECTION      GASTRIC RESTRICTION SURGERY  2019    TONSILLECTOMY      VASCULAR SURGERY Left 2020    VI- L GSV RFA     Family History   Problem Relation Age of Onset    Diabetes Mother     Thyroid Disease Mother         had part of her thyroid removed    Cancer Sister         skin cancer, leukemia    Heart Disease Sister     Breast Cancer Sister     Celiac Disease Sister     Other Sister         lyme dz    Thyroid Disease Sister     Lung Cancer Maternal Grandfather     Cancer Maternal Grandfather         throat cancer    Colon Cancer Neg Hx     Colon Polyps Neg Hx     Esophageal Cancer Neg Hx     Liver Cancer Neg Hx     Liver Disease Neg Hx     Rectal Cancer Neg Hx     Stomach Cancer Neg Hx      Social History     Tobacco Use    Smoking status: Former Smoker     Quit date: 2010     Years since quittin.8    Smokeless tobacco: Never Used   Substance Use Topics    Alcohol use: No       ROS  Eyes  no sudden vision change or amaurosis. Respiratory  no significant shortness of breath,  Cardiovascular  no chest pain or syncope. No  significant leg swelling. No claudication. Musculoskeletal  no gait disturbance  Skin  no new wound. Neurologic   No speech difficulty or lateralizing weakness. All other review of systems are negative. No flowsheet data found.        Physical Exam    Due to this being a TeleHealth encounter, evaluation of the following organ systems is limited: Vitals/Constitutional/EENT/Resp/CV/GI//MS/Neuro/Skin/Heme-Lymph-Imm. Constitutional  well developed, well nourished. No diaphoresis or acute distress. Neck- ROM appears normal  Extremities -No cyanosis, clubbing, has edema. No signs atheroembolic event. Ha varicose vins  Pulmonary  effort appears normal.  No respiratory distress. No accessory muscle use  Neurologic  alert and oriented X 3. Neurologic physiologic  Skin  intact. No rash, erythema, or pallor. Psychiatric  mood, affect, and behavior appear normal.  Judgment and thought processes appear normal.    Risk factors for atherosclerosis of all vascular beds have been reviewed with the patient including:  Family history, tobacco abuse in all forms, elevated cholesterol, hyperlipidemia, and diabetes. Venous ultrasound results:  october  Right Side: The greater saphenous vein exhibits reflux lasting for a    maximum of 6598 milliseconds in the low thigh. There is no evidence of    deep venous thrombosis in the segments insonated. There is 1430 seconds of    deep vein reflux.   Dorothe Uche is 6598 seconds of short saphenous vein reflux.    Greater saphenous vein ablated. It reopens distally, also reflux in the    anterior saphenous branch.        Left iliac vein stent appears patent    Left Side: The greater saphenous vein exhibits reflux lasting for a    maximum of 3051 milliseconds in the saphenofemoral junction. There is no    evidence of deep venous thrombosis in the segments insonated. There is    1254 seconds of deep vein reflux.   Dorothe Midland is 3777 seconds of short saphenous vein reflux     Individual images were reviewed. Results were reviewed with the patient. Individual films reviewed: Yes. Test results were reviewed with the patient       Options have been discussed with the patient including continued medical management vs. proceeding with left lsv ablation. Patient has opted to proceed with this.   Risks have been discussed with the patient including but not limited to MI, death, CVA, bleed, nerve injury, dvt, hyperpigmentation, and infection. ASSESSMENT/PLAN:  1. Leg swelling  2. Left leg pain  3. Venous insufficiency    1. Leg swelling    2. Left leg pain    3. Venous insufficiency       Proceed with left LSV ablation      Nilo Leavitt, was evaluated through a synchronous (real-time) audio-video  encounter. The patient (or guardian if applicable) is aware that this is a billable  service, which includes applicable co-pays. This Virtual Visit was conducted with  patient's (and/or legal guardian's) consent. The visit was conducted pursuant to  the emergency declaration under the [address] [place] [address] authority and the [employer] and  [employer] General Act. Patient identification was verified,  and a caregiver was present when appropriate. The patient was located in a  state where the provider was licensed to provide care. Patient identification was verified at the start of the visit: Yes      Services were provided through a video synchronous discussion virtually to substitute for in-person clinic visit. Patient and provider were located at their individual homes. An electronic signature was used to authenticate this note.     --Mickeal Self, APRN

## 2022-03-17 NOTE — PROGRESS NOTES
Perfecto Sever (:  1985) is a 43 y.o. female,Established patient, here for evaluation of the following chief complaint(s): Follow-up          SUBJECTIVE/OBJECTIVE:  She has a known history of of varicose veins and chronic venous insufficiency. She reports prominent veins on the  Left leg(s), spider veins on the  Left leg(s), lower extremity edema on the  Left leg(s), the veins are painful -- severity:  moderate and pain is aggravated by upright posture. She reports previous treatment includes prescription compression stockings for > 6 months Ablation left GSV as well as left iliac vein stent. She does not have a history of spontaneous rupture. She is now scheduled for a left LSV ablation. Differential diagnosis for leg pain includes but is not limited to: varicose veins, peripheral vascular disease, arthritic pain, DVT, lumbar disc disease, and neurogenic pain. Perfecto Sever is a 43 y.o. female with the following history as recorded in [facility]:  Patient Active Problem List    Diagnosis Date Noted    Leg swelling 2020    Pain in both lower extremities 2020    Varicose veins of left lower extremity 2020    PCOS (polycystic ovarian syndrome) 2016    Depression (emotion) 2016    History of hepatitis C virus infection 2015    Maternal MCV (mean corpuscular volume) low 2013    Cancer screening 2013    Anxiety and depression 2013     Current Outpatient Medications   Medication Sig Dispense Refill    apixaban (ELIQUIS) 2.5 MG TABS tablet Take 1 tablet by mouth twice daily 60 tablet 1    lamoTRIgine (LAMICTAL) 150 MG tablet 75 mg       aspirin 81 MG EC tablet Take 81 mg by mouth daily      traZODone (DESYREL) 150 MG tablet Take 150 mg by mouth nightly      amphetamine-dextroamphetamine (ADDERALL) 10 MG tablet Take 10 mg by mouth daily.  Dr Jack Moy Multiple Vitamin (MULTI-VITAMINS PO) Take 1 tablet by mouth daily       escitalopram (LEXAPRO) 20 MG tablet Take 20 mg by mouth daily Take 1 in half tab daily to equal 25mg       No current facility-administered medications for this visit. Allergies: Adhesive tape  Past Medical History:   Diagnosis Date    Anxiety     Back pain     Depression     Hepatitis C     History of anemia     History of gallstones     History of kidney stones     Knee pain     left    Postpartum depression      Past Surgical History:   Procedure Laterality Date     SECTION      GASTRIC RESTRICTION SURGERY  2019    TONSILLECTOMY      VASCULAR SURGERY Left 2020    VI- L GSV RFA     Family History   Problem Relation Age of Onset    Diabetes Mother     Thyroid Disease Mother         had part of her thyroid removed    Cancer Sister         skin cancer, leukemia    Heart Disease Sister     Breast Cancer Sister     Celiac Disease Sister     Other Sister         lyme dz    Thyroid Disease Sister     Lung Cancer Maternal Grandfather     Cancer Maternal Grandfather         throat cancer    Colon Cancer Neg Hx     Colon Polyps Neg Hx     Esophageal Cancer Neg Hx     Liver Cancer Neg Hx     Liver Disease Neg Hx     Rectal Cancer Neg Hx     Stomach Cancer Neg Hx      Social History     Tobacco Use    Smoking status: Former Smoker     Quit date: 2010     Years since quittin.8    Smokeless tobacco: Never Used   Substance Use Topics    Alcohol use: No       ROS  Eyes  no sudden vision change or amaurosis. Respiratory  no significant shortness of breath,  Cardiovascular  no chest pain or syncope. No  significant leg swelling. No claudication. Musculoskeletal  no gait disturbance  Skin  no new wound. Neurologic   No speech difficulty or lateralizing weakness. All other review of systems are negative. No flowsheet data found.        Physical Exam    Due to this being a TeleHealth encounter, evaluation of the following organ systems is limited: Vitals/Constitutional/EENT/Resp/CV/GI//MS/Neuro/Skin/Heme-Lymph-Imm. Constitutional  well developed, well nourished. No diaphoresis or acute distress. Neck- ROM appears normal  Extremities -No cyanosis, clubbing, has edema. No signs atheroembolic event. Ha varicose vins  Pulmonary  effort appears normal.  No respiratory distress. No accessory muscle use  Neurologic  alert and oriented X 3. Neurologic physiologic  Skin  intact. No rash, erythema, or pallor. Psychiatric  mood, affect, and behavior appear normal.  Judgment and thought processes appear normal.    Risk factors for atherosclerosis of all vascular beds have been reviewed with the patient including:  Family history, tobacco abuse in all forms, elevated cholesterol, hyperlipidemia, and diabetes. Venous ultrasound results:  october  Right Side: The greater saphenous vein exhibits reflux lasting for a    maximum of 6598 milliseconds in the low thigh. There is no evidence of    deep venous thrombosis in the segments insonated. There is 1430 seconds of    deep vein reflux.   Brandon Motto is 6598 seconds of short saphenous vein reflux.    Greater saphenous vein ablated. It reopens distally, also reflux in the    anterior saphenous branch.        Left iliac vein stent appears patent    Left Side: The greater saphenous vein exhibits reflux lasting for a    maximum of 3051 milliseconds in the saphenofemoral junction. There is no    evidence of deep venous thrombosis in the segments insonated. There is    1254 seconds of deep vein reflux.   Brandon Motto is 3777 seconds of short saphenous vein reflux     Individual images were reviewed. Results were reviewed with the patient. Individual films reviewed: Yes. Test results were reviewed with the patient       Options have been discussed with the patient including continued medical management vs. proceeding with left lsv ablation. Patient has opted to proceed with this.   Risks have been discussed with the patient including but not limited to MI, death, CVA, bleed, nerve injury, dvt, hyperpigmentation, and infection. ASSESSMENT/PLAN:  1. Leg swelling  2. Left leg pain  3. Venous insufficiency    1. Leg swelling    2. Left leg pain    3. Venous insufficiency       Proceed with left LSV ablation      Luz Maria Christian, was evaluated through a synchronous (real-time) audio-video  encounter. The patient (or guardian if applicable) is aware that this is a billable  service, which includes applicable co-pays. This Virtual Visit was conducted with  patient's (and/or legal guardian's) consent. The visit was conducted pursuant to  the emergency declaration under the [address] [place] [address] authority and the [employer] and  [employer] General Act. Patient identification was verified,  and a caregiver was present when appropriate. The patient was located in a  state where the provider was licensed to provide care. Patient identification was verified at the start of the visit: Yes      Services were provided through a video synchronous discussion virtually to substitute for in-person clinic visit. Patient and provider were located at their individual homes. An electronic signature was used to authenticate this note.     --ASHLY Hagen

## 2022-03-18 ENCOUNTER — HOSPITAL ENCOUNTER (OUTPATIENT)
Dept: INTERVENTIONAL RADIOLOGY/VASCULAR | Age: 37
Discharge: HOME | End: 2022-03-18
Payer: MEDICAID

## 2022-03-18 VITALS
TEMPERATURE: 97.5 F | RESPIRATION RATE: 19 BRPM | SYSTOLIC BLOOD PRESSURE: 94 MMHG | HEART RATE: 78 BPM | DIASTOLIC BLOOD PRESSURE: 66 MMHG | OXYGEN SATURATION: 98 % | BODY MASS INDEX: 33.29 KG/M2 | WEIGHT: 195 LBS | HEIGHT: 64 IN

## 2022-03-18 DIAGNOSIS — I73.9 PVD (PERIPHERAL VASCULAR DISEASE) (HCC): ICD-10-CM

## 2022-03-18 LAB — HCG(URINE) PREGNANCY TEST: NEGATIVE

## 2022-03-18 PROCEDURE — 36475 ENDOVENOUS RF 1ST VEIN: CPT | Performed by: SURGERY

## 2022-03-18 PROCEDURE — 84703 CHORIONIC GONADOTROPIN ASSAY: CPT

## 2022-03-18 PROCEDURE — 99153 MOD SED SAME PHYS/QHP EA: CPT

## 2022-03-18 PROCEDURE — 99152 MOD SED SAME PHYS/QHP 5/>YRS: CPT

## 2022-03-18 PROCEDURE — 6360000002 HC RX W HCPCS: Performed by: SURGERY

## 2022-03-18 PROCEDURE — 36475 ENDOVENOUS RF 1ST VEIN: CPT

## 2022-03-18 PROCEDURE — 2709999900 IR ULTRASOUND GUIDANCE VASCULAR ACCESS

## 2022-03-18 PROCEDURE — 2580000003 HC RX 258: Performed by: SURGERY

## 2022-03-18 PROCEDURE — 2500000003 HC RX 250 WO HCPCS: Performed by: SURGERY

## 2022-03-18 RX ORDER — MIDAZOLAM HYDROCHLORIDE 1 MG/ML
INJECTION INTRAMUSCULAR; INTRAVENOUS
Status: COMPLETED | OUTPATIENT
Start: 2022-03-18 | End: 2022-03-18

## 2022-03-18 RX ORDER — CLONIDINE HYDROCHLORIDE 0.1 MG/1
0.1 TABLET ORAL PRN
Status: DISCONTINUED | OUTPATIENT
Start: 2022-03-18 | End: 2022-03-20 | Stop reason: HOSPADM

## 2022-03-18 RX ORDER — SODIUM CHLORIDE 9 MG/ML
25 INJECTION, SOLUTION INTRAVENOUS PRN
Status: DISCONTINUED | OUTPATIENT
Start: 2022-03-18 | End: 2022-03-20 | Stop reason: HOSPADM

## 2022-03-18 RX ORDER — FENTANYL CITRATE 50 UG/ML
INJECTION, SOLUTION INTRAMUSCULAR; INTRAVENOUS
Status: COMPLETED | OUTPATIENT
Start: 2022-03-18 | End: 2022-03-18

## 2022-03-18 RX ORDER — LIDOCAINE HYDROCHLORIDE 20 MG/ML
INJECTION, SOLUTION INFILTRATION; PERINEURAL
Status: COMPLETED | OUTPATIENT
Start: 2022-03-18 | End: 2022-03-18

## 2022-03-18 RX ORDER — SODIUM CHLORIDE 0.9 % (FLUSH) 0.9 %
10 SYRINGE (ML) INJECTION PRN
Status: DISCONTINUED | OUTPATIENT
Start: 2022-03-18 | End: 2022-03-20 | Stop reason: HOSPADM

## 2022-03-18 RX ADMIN — LIDOCAINE HYDROCHLORIDE 5 ML: 20 INJECTION, SOLUTION INFILTRATION; PERINEURAL at 13:29

## 2022-03-18 RX ADMIN — MIDAZOLAM 1 MG: 1 INJECTION INTRAMUSCULAR; INTRAVENOUS at 13:27

## 2022-03-18 RX ADMIN — MIDAZOLAM 1 MG: 1 INJECTION INTRAMUSCULAR; INTRAVENOUS at 13:21

## 2022-03-18 RX ADMIN — FENTANYL CITRATE 50 MCG: 50 INJECTION INTRAMUSCULAR; INTRAVENOUS at 13:32

## 2022-03-18 RX ADMIN — FENTANYL CITRATE 50 MCG: 50 INJECTION INTRAMUSCULAR; INTRAVENOUS at 13:28

## 2022-03-18 RX ADMIN — EPINEPHRINE: 1 INJECTION, SOLUTION, CONCENTRATE INTRAVENOUS at 14:00

## 2022-03-18 NOTE — OP NOTE
Operative Note      Patient: Daniel Horne  YOB: 1985  MRN: [id number]    [address] OF PROCEDURE: 3/18/2022    PRE-PROCEDURE DIAGNOSIS:    1. Reflux and insufficiency of the left lesser saphenous vein  2. Symptomatic pain, aching and edema       POST PROCEDURE DIAGNOSIS:  Same    PROCEDURE:  1. Ultrasound guided cannulation of left lesser saphenous vein  2.  Radiofrequency ablation of left lesser saphenous vein     ANESTHETIC:    1.  1% lidocaine without epinephrine for placing sheath   2. Tumescent anesthetic solution along the saphenous vein from sheath insertion site to the origin of saphenous vein radiofrequency catheter and saphenofemoral  junction    SURGEON:  Ena Snow, DO    PROCEDURE IN DETAIL:      After the risks, benefits, and alternatives of the procedure were explained to the patient, including how the procedure would be performed, the patient signed an informed consent. The patient's leg was prepped and draped in the standard surgical fashion. Approximately 3-4 mL of 1% lidocaine was injected into the skin and subcutaneous tissues over the saphenous vein insertion site. The left Lesser saphenous vein was then cannulated utilizing ultrasound guidance with a micropuncture needle and .018\" wire was placed through the needle. The needle was removed and replaced with a 7F sheath. The radiofrequency catheter was placed through the 7f sheath and under ultrasound guidance, the tip was directed to 2 cm from the saphenofemoral  junction. Next, under ultrasound guidance, tumescent anesthetic solution was injected around the saphenous vein to create a halo of anesthesia along the entire vein from the sheath insertion site to the saphenofemoral  junction. The tip of the catheter was again visualized to ensure position remains 2 cm from the saphenofemoral  junction.     The radiofrequency ablation then commensed while gentle pressure on the ultrasound probe/saphenous vein was applied. A total of 7 treatment cycles was then performed. The sheath and catheter were removed and both were inspected and intact. Pressure was applied at exit site for 10 minutes. Sterile dressings and ace wrap were applied. The patient was released in stable condition and discharge instructions were given to the patient. They will follow-up in 72 hours for left lower extremity venous duplex and office visit.           Electronically signed by Jose Maria Hendricks DO on 3/18/2022 at 2:01 PM

## 2022-03-18 NOTE — PROGRESS NOTES
Discharge instructions reviewed with patient and patient states understanding. Left leg ace wrap dressing c/d/i.  Patient discharged from cath holding with all belongings and AVS.  Electronically signed by Jerome Elias RN on 3/18/2022 at 3:47 PM

## 2022-03-21 DIAGNOSIS — R53.83 FATIGUE, UNSPECIFIED TYPE: ICD-10-CM

## 2022-03-21 DIAGNOSIS — Z87.42 HISTORY OF PCOS: ICD-10-CM

## 2022-03-21 DIAGNOSIS — Z01.419 ENCOUNTER FOR WELL WOMAN EXAM WITH ROUTINE GYNECOLOGICAL EXAM: Primary | ICD-10-CM

## 2022-03-21 DIAGNOSIS — R61 NIGHT SWEATS: ICD-10-CM

## 2022-03-22 ENCOUNTER — TELEPHONE (OUTPATIENT)
Dept: VASCULAR SURGERY | Age: 37
End: 2022-03-22

## 2022-03-23 LAB
HPV COMMENT: NORMAL
HPV TYPE 16: NOT DETECTED
HPV TYPE 18: NOT DETECTED
HPVOH (OTHER TYPES): NOT DETECTED

## 2022-03-25 ENCOUNTER — HOSPITAL ENCOUNTER (OUTPATIENT)
Dept: VASCULAR LAB | Age: 37
Discharge: HOME | End: 2022-03-25
Payer: MEDICAID

## 2022-03-25 ENCOUNTER — SCHEDULED TELEPHONE ENCOUNTER (OUTPATIENT)
Dept: VASCULAR SURGERY | Age: 37
End: 2022-03-25
Payer: MEDICAID

## 2022-03-25 DIAGNOSIS — I87.2 CHRONIC VENOUS INSUFFICIENCY: Primary | ICD-10-CM

## 2022-03-25 DIAGNOSIS — M79.89 LEG SWELLING: ICD-10-CM

## 2022-03-25 DIAGNOSIS — R53.83 FATIGUE, UNSPECIFIED TYPE: ICD-10-CM

## 2022-03-25 DIAGNOSIS — M79.605 LEFT LEG PAIN: ICD-10-CM

## 2022-03-25 DIAGNOSIS — Z87.42 HISTORY OF PCOS: ICD-10-CM

## 2022-03-25 DIAGNOSIS — R61 NIGHT SWEATS: ICD-10-CM

## 2022-03-25 DIAGNOSIS — Z01.419 ENCOUNTER FOR WELL WOMAN EXAM WITH ROUTINE GYNECOLOGICAL EXAM: ICD-10-CM

## 2022-03-25 PROCEDURE — 99212 OFFICE O/P EST SF 10 MIN: CPT | Performed by: PHYSICIAN ASSISTANT

## 2022-03-25 PROCEDURE — 93971 EXTREMITY STUDY: CPT

## 2022-03-27 LAB — THYROID PEROXIDASE (TPO) ABS: 0.6 IU/ML (ref 0–9)

## 2022-03-29 DIAGNOSIS — E28.2 PCOS (POLYCYSTIC OVARIAN SYNDROME): Primary | ICD-10-CM

## 2022-03-29 NOTE — INTERVAL H&P NOTE
Update History & Physical    The patient's History and Physical of March 17, 2022 was reviewed with the patient and I examined the patient. There was no change. The surgical site was confirmed by the patient and me. Plan: The risks, benefits, expected outcome, and alternative to the recommended procedure have been discussed with the patient. Patient understands and wants to proceed with the procedure.      Electronically signed by Flako Morley DO on 3/29/2022 at 5:07 PM

## 2022-04-01 ENCOUNTER — HOSPITAL ENCOUNTER (OUTPATIENT)
Dept: GENERAL RADIOLOGY | Age: 37
Discharge: HOME | End: 2022-04-01
Payer: MEDICAID

## 2022-04-01 DIAGNOSIS — E28.2 PCOS (POLYCYSTIC OVARIAN SYNDROME): ICD-10-CM

## 2022-04-01 PROCEDURE — 76830 TRANSVAGINAL US NON-OB: CPT

## 2022-04-12 ENCOUNTER — HOSPITAL ENCOUNTER (OUTPATIENT)
Dept: WOMENS IMAGING | Age: 37
Discharge: HOME | End: 2022-04-12

## 2022-04-26 DIAGNOSIS — E28.2 PCOS (POLYCYSTIC OVARIAN SYNDROME): Primary | ICD-10-CM

## 2022-04-26 RX ORDER — METFORMIN HYDROCHLORIDE 500 MG/1
500 TABLET, EXTENDED RELEASE ORAL
Qty: 30 TABLET | Refills: 3 | Status: SHIPPED | OUTPATIENT
Start: 2022-04-26

## 2023-01-26 ENCOUNTER — OFFICE VISIT (OUTPATIENT)
Dept: PRIMARY CARE CLINIC | Age: 38
End: 2023-01-26
Payer: MEDICAID

## 2023-01-26 VITALS
BODY MASS INDEX: 32.15 KG/M2 | RESPIRATION RATE: 16 BRPM | TEMPERATURE: 98 F | OXYGEN SATURATION: 99 % | SYSTOLIC BLOOD PRESSURE: 132 MMHG | HEIGHT: 65 IN | DIASTOLIC BLOOD PRESSURE: 86 MMHG | WEIGHT: 193 LBS | HEART RATE: 89 BPM

## 2023-01-26 DIAGNOSIS — I83.812 VARICOSE VEINS OF LEFT LOWER EXTREMITY WITH PAIN: ICD-10-CM

## 2023-01-26 DIAGNOSIS — Z00.00 WELL WOMAN EXAM WITHOUT GYNECOLOGICAL EXAM: Primary | ICD-10-CM

## 2023-01-26 DIAGNOSIS — M79.604 PAIN IN BOTH LOWER EXTREMITIES: ICD-10-CM

## 2023-01-26 DIAGNOSIS — Z00.00 ENCOUNTER FOR WELL ADULT EXAM WITHOUT ABNORMAL FINDINGS: ICD-10-CM

## 2023-01-26 DIAGNOSIS — M79.605 PAIN IN BOTH LOWER EXTREMITIES: ICD-10-CM

## 2023-01-26 PROCEDURE — 99395 PREV VISIT EST AGE 18-39: CPT | Performed by: FAMILY MEDICINE

## 2023-01-26 RX ORDER — DEXMETHYLPHENIDATE HYDROCHLORIDE 20 MG/1
CAPSULE, EXTENDED RELEASE ORAL
COMMUNITY
Start: 2023-01-10 | End: 2023-02-01

## 2023-01-26 RX ORDER — DEXMETHYLPHENIDATE HYDROCHLORIDE 10 MG/1
10 CAPSULE, EXTENDED RELEASE ORAL DAILY
COMMUNITY

## 2023-01-26 RX ORDER — DEXMETHYLPHENIDATE HYDROCHLORIDE 10 MG/1
CAPSULE, EXTENDED RELEASE ORAL
COMMUNITY
Start: 2023-01-12 | End: 2023-02-01

## 2023-01-26 RX ORDER — DOXEPIN HYDROCHLORIDE 25 MG/1
CAPSULE ORAL
COMMUNITY
Start: 2023-01-10 | End: 2023-02-01

## 2023-01-26 RX ORDER — METHYLPHENIDATE HYDROCHLORIDE 10 MG/1
TABLET ORAL
COMMUNITY
Start: 2022-12-16

## 2023-01-26 RX ORDER — LAMOTRIGINE 100 MG/1
100 TABLET ORAL DAILY
COMMUNITY
Start: 2022-11-07

## 2023-01-26 SDOH — ECONOMIC STABILITY: FOOD INSECURITY: WITHIN THE PAST 12 MONTHS, THE FOOD YOU BOUGHT JUST DIDN'T LAST AND YOU DIDN'T HAVE MONEY TO GET MORE.: NEVER TRUE

## 2023-01-26 SDOH — ECONOMIC STABILITY: FOOD INSECURITY: WITHIN THE PAST 12 MONTHS, YOU WORRIED THAT YOUR FOOD WOULD RUN OUT BEFORE YOU GOT MONEY TO BUY MORE.: NEVER TRUE

## 2023-01-26 ASSESSMENT — PATIENT HEALTH QUESTIONNAIRE - PHQ9
SUM OF ALL RESPONSES TO PHQ QUESTIONS 1-9: 0
7. TROUBLE CONCENTRATING ON THINGS, SUCH AS READING THE NEWSPAPER OR WATCHING TELEVISION: 0
10. IF YOU CHECKED OFF ANY PROBLEMS, HOW DIFFICULT HAVE THESE PROBLEMS MADE IT FOR YOU TO DO YOUR WORK, TAKE CARE OF THINGS AT HOME, OR GET ALONG WITH OTHER PEOPLE: 0
SUM OF ALL RESPONSES TO PHQ QUESTIONS 1-9: 0
1. LITTLE INTEREST OR PLEASURE IN DOING THINGS: 0
2. FEELING DOWN, DEPRESSED OR HOPELESS: 0
3. TROUBLE FALLING OR STAYING ASLEEP: 0
5. POOR APPETITE OR OVEREATING: 0
8. MOVING OR SPEAKING SO SLOWLY THAT OTHER PEOPLE COULD HAVE NOTICED. OR THE OPPOSITE, BEING SO FIGETY OR RESTLESS THAT YOU HAVE BEEN MOVING AROUND A LOT MORE THAN USUAL: 0
SUM OF ALL RESPONSES TO PHQ QUESTIONS 1-9: 0
SUM OF ALL RESPONSES TO PHQ9 QUESTIONS 1 & 2: 0
6. FEELING BAD ABOUT YOURSELF - OR THAT YOU ARE A FAILURE OR HAVE LET YOURSELF OR YOUR FAMILY DOWN: 0
9. THOUGHTS THAT YOU WOULD BE BETTER OFF DEAD, OR OF HURTING YOURSELF: 0
4. FEELING TIRED OR HAVING LITTLE ENERGY: 0
SUM OF ALL RESPONSES TO PHQ QUESTIONS 1-9: 0

## 2023-01-26 ASSESSMENT — SOCIAL DETERMINANTS OF HEALTH (SDOH): HOW HARD IS IT FOR YOU TO PAY FOR THE VERY BASICS LIKE FOOD, HOUSING, MEDICAL CARE, AND HEATING?: NOT HARD AT ALL

## 2023-02-01 ASSESSMENT — ENCOUNTER SYMPTOMS
WHEEZING: 0
ABDOMINAL PAIN: 0
NAUSEA: 0
DIARRHEA: 0
EYE DISCHARGE: 0
COLOR CHANGE: 0
VOMITING: 0
COUGH: 0
BACK PAIN: 0

## 2023-02-01 NOTE — PATIENT INSTRUCTIONS
Starting a Weight Loss Plan: Care Instructions  Overview     If you're thinking about losing weight, it can be hard to know where to start. Your doctor can help you set up a weight loss plan that best meets your needs. You may want to take a class on nutrition or exercise, or you could join a weight loss support group. If you have questions about how to make changes to your eating or exercise habits, ask your doctor about seeing a registered dietitian or an exercise specialist.  It can be a big challenge to lose weight. But you don't have to make huge changes at once. Make small changes, and stick with them. When those changes become habit, add a few more changes. If you don't think you're ready to make changes right now, try to pick a date in the future. Make an appointment to see your doctor to discuss whether the time is right for you to start a plan. Follow-up care is a key part of your treatment and safety. Be sure to make and go to all appointments, and call your doctor if you are having problems. It's also a good idea to know your test results and keep a list of the medicines you take. How can you care for yourself at home? Set realistic goals. Many people expect to lose much more weight than is likely. A weight loss of 5% to 10% of your body weight may be enough to improve your health. Get family and friends involved to provide support. Talk to them about why you are trying to lose weight, and ask them to help. They can help by participating in exercise and having meals with you, even if they may be eating something different. Find what works best for you. If you do not have time or do not like to cook, a program that offers meal replacement bars or shakes may be better for you. Or if you like to prepare meals, finding a plan that includes daily menus and recipes may be best.  Ask your doctor about other health professionals who can help you achieve your weight loss goals.   A dietitian can help you make healthy changes in your diet. An exercise specialist or  can help you develop a safe and effective exercise program.  A counselor or psychiatrist can help you cope with issues such as depression, anxiety, or family problems that can make it hard to focus on weight loss. Consider joining a support group for people who are trying to lose weight. Your doctor can suggest groups in your area. Where can you learn more? Go to http://www.woods.com/ and enter U357 to learn more about \"Starting a Weight Loss Plan: Care Instructions. \"  Current as of: August 25, 2022               Content Version: 13.5  © 2006-2022 [employer]. Care instructions adapted under license by [facility] ([facility]). If you have questions about a medical condition or this instruction, always ask your healthcare professional. [address] any warranty or liability for your use of this information. Well Visit, Ages 25 to 72: Care Instructions  Well visits can help you stay healthy. Your doctor has checked your overall health and may have suggested ways to take good care of yourself. Your doctor also may have recommended tests. You can help prevent illness with healthy eating, good sleep, vaccinations, regular exercise, and other steps. Get the tests that you and your doctor decide on. Depending on your age and risks, examples might include screening for diabetes; hepatitis C; HIV; and cervical, breast, lung, and colon cancer. Screening helps find diseases before any symptoms appear. Eat healthy foods. Choose fruits, vegetables, whole grains, lean protein, and low-fat dairy foods. Limit saturated fat and reduce salt. Limit alcohol. Men should have no more than 2 drinks a day. Women should have no more than 1. For some people, no alcohol is the best choice. Exercise. Get at least 30 minutes of exercise on most days of the week. Walking can be a good choice.      Reach and stay at your healthy weight. This will lower your risk for many health problems. Take care of your mental health. Try to stay connected with friends, family, and community, and find ways to manage stress. If you're feeling depressed or hopeless, talk to someone. A counselor can help. If you don't have a counselor, talk to your doctor. Talk to your doctor if you think you may have a problem with alcohol or drug use. This includes prescription medicines and illegal drugs. Avoid tobacco and nicotine: Don't smoke, vape, or chew. If you need help quitting, talk to your doctor. Practice safer sex. Getting tested, using condoms or dental dams, and limiting sex partners can help prevent STIs. Use birth control if it's important to you to prevent pregnancy. Talk with your doctor about your choices and what might be best for you. Prevent problems where you can. Protect your skin from too much sun, wash your hands, brush your teeth twice a day, and wear a seat belt in the car. Where can you learn more? Go to http://www.glover.com/ and enter P072 to learn more about \"Well Visit, Ages 25 to 72: Care Instructions. \"  Current as of: March 9, 2022               Content Version: 13.5  © 8455-1272 [employer]. Care instructions adapted under license by [facility] ([facility]). If you have questions about a medical condition or this instruction, always ask your healthcare professional. [place] [address] any warranty or liability for your use of this information.

## 2023-02-01 NOTE — PROGRESS NOTES
Well Adult Note  Name: Nemesio Carter Date: 2023   MRN: [id number] Sex: Female   Age: 42 y.o. Ethnicity: Non- / Non    : 1985 Race: White (non-)      Joan Fischer is here for well adult exam.  History:  Patient is seen today for yearly physical and checkup. She sees GYN for routine Paps. She is not fasting today for labs. She states that she is struggling with varicose veins. She states that she does have pain in both of her lower extremities. She states that she would like referral to vascular surgery. She states that she has seen vascular at [facility] but states that nothing really was done for her. She states that she would like to see Dr. Jensen Ronquillo for further evaluation just to see if there is anything that can be done for the pain associated with her varicose veins. Review of Systems   Constitutional:  Negative for activity change, appetite change and fever. HENT:  Negative for congestion and nosebleeds. Eyes:  Negative for discharge. Respiratory:  Negative for cough and wheezing. Cardiovascular:  Negative for chest pain and leg swelling. Gastrointestinal:  Negative for abdominal pain, diarrhea, nausea and vomiting. Genitourinary:  Negative for difficulty urinating, frequency and urgency. Musculoskeletal:  Negative for back pain and gait problem. +pain in bilateral lower extremities   Skin:  Negative for color change and rash. Neurological:  Negative for dizziness and headaches. Hematological:  Does not bruise/bleed easily. Psychiatric/Behavioral:  Negative for sleep disturbance and suicidal ideas. Allergies   Allergen Reactions    Adhesive Tape          Prior to Visit Medications    Medication Sig Taking? Authorizing Provider   lamoTRIgine (LAMICTAL) 100 MG tablet Take 100 mg by mouth daily Yes Historical Provider, MD   Dexmethylphenidate HCl ER (FOCALIN XR) 10 MG CP24 Take 10 mg by mouth daily.  Yes Historical Provider, MD apixaban (ELIQUIS) 2.5 MG TABS tablet Take 1 tablet by mouth twice daily Yes ASHLY Romo   traZODone (DESYREL) 150 MG tablet Take 50 mg by mouth nightly Yes Historical Provider, MD   Multiple Vitamin (MULTI-VITAMINS PO) Take 1 tablet by mouth daily  Yes Historical Provider, MD   escitalopram (LEXAPRO) 20 MG tablet Take 20 mg by mouth daily Take 1 in half tab daily to equal 25mg Yes Historical Provider, MD   methylphenidate (RITALIN) 10 MG tablet   Historical Provider, MD         Past Medical History:   Diagnosis Date    Anxiety     Back pain     Depression     Hepatitis C     History of anemia     History of gallstones     History of kidney stones     Knee pain     left    Postpartum depression        Past Surgical History:   Procedure Laterality Date     SECTION      GASTRIC RESTRICTION SURGERY  2019    TONSILLECTOMY      VASCULAR SURGERY Left 2020    VI- L GSV RFA    VASCULAR SURGERY  2021    VI. Venogram of IVC and bilateral common and external iliac veins. Placement of left common iliac stent wallstent 18 x 19. intrasvascular ultrasound of ivc, bilateral common and external iliac veins. VASCULAR SURGERY  2021    VI. Left iliac venogram.         Family History   Problem Relation Age of Onset    Diabetes Mother     Thyroid Disease Mother         had part of her thyroid removed    Cancer Sister         skin cancer, leukemia    Heart Disease Sister     Breast Cancer Sister     Celiac Disease Sister     Other Sister         lyme dz    Thyroid Disease Sister     Lung Cancer Maternal Grandfather     Cancer Maternal Grandfather         throat cancer    Colon Cancer Neg Hx     Colon Polyps Neg Hx     Esophageal Cancer Neg Hx     Liver Cancer Neg Hx     Liver Disease Neg Hx     Rectal Cancer Neg Hx     Stomach Cancer Neg Hx        Social History     Tobacco Use    Smoking status: Former     Types: Cigarettes     Quit date: 2010     Years since quittin.7    Smokeless tobacco: Never   Vaping Use    Vaping Use: Never used   Substance Use Topics    Alcohol use: No    Drug use: Yes     Types: Marijuana Dalton Lockwood), Cocaine, IV     Comment: Last used 01/22/2010       Objective   /86   Pulse 89   Temp 98 °F (36.7 °C) (Temporal)   Resp 16   Ht 5' 4.5\" (1.638 m)   Wt 193 lb (87.5 kg)   SpO2 99%   BMI 32.62 kg/m²   Wt Readings from Last 3 Encounters:   01/26/23 193 lb (87.5 kg)   03/18/22 195 lb (88.5 kg)   03/15/22 195 lb (88.5 kg)         Physical Exam  Constitutional:       Appearance: Normal appearance. She is well-developed. HENT:      Head: Normocephalic and atraumatic. Mouth/Throat:      Mouth: Mucous membranes are moist.   Neck:      Thyroid: No thyroid mass or thyromegaly. Vascular: No carotid bruit. Cardiovascular:      Rate and Rhythm: Normal rate and regular rhythm. Pulses: Normal pulses. Heart sounds: No murmur heard. No friction rub. No gallop. Pulmonary:      Effort: Pulmonary effort is normal. No respiratory distress. Breath sounds: Normal breath sounds. Abdominal:      General: Abdomen is flat. Bowel sounds are normal.      Palpations: Abdomen is soft. Tenderness: There is no abdominal tenderness. Musculoskeletal:      Cervical back: Normal range of motion and neck supple. Lymphadenopathy:      Cervical: No cervical adenopathy. Skin:     General: Skin is warm and dry. Findings: No rash. Neurological:      General: No focal deficit present. Mental Status: She is alert and oriented to person, place, and time. Mental status is at baseline. Psychiatric:         Mood and Affect: Mood normal.         Behavior: Behavior normal.         Thought Content: Thought content normal.         Judgment: Judgment normal.         Assessment   Plan   1. Well woman exam without gynecological exam  2. Pain in both lower extremities  -     External Referral To Vascular Surgery  3.  Varicose veins of left lower extremity with pain  - External Referral To Vascular Surgery  4. Encounter for well adult exam without abnormal findings     I put a referral in for vascular for further evaluation and management of her varicose veins. She is going to return to have fasting labs done in the next few weeks. Follow-up with us in 1 year for a physical unless needed sooner. Personalized Preventive Plan   Current Health Maintenance Status    There is no immunization history on file for this patient. Health Maintenance   Topic Date Due    COVID-19 Vaccine (1) Never done    Varicella vaccine (1 of 2 - 2-dose childhood series) Never done    DTaP/Tdap/Td vaccine (1 - Tdap) 02/16/2023 (Originally 10/13/2002)    Flu vaccine (1) 01/26/2024 (Originally 8/1/2022)    Depression Monitoring  01/26/2024    Cervical cancer screen  03/15/2027    HIV screen  Addressed    Hepatitis A vaccine  Aged Out    Hib vaccine  Aged Out    Meningococcal (ACWY) vaccine  Aged Out    Pneumococcal 0-64 years Vaccine  Aged Out     Recommendations for [employer] Due: see orders and patient instructions/AVS.    No follow-ups on file.

## 2023-03-09 DIAGNOSIS — E28.2 PCOS (POLYCYSTIC OVARIAN SYNDROME): ICD-10-CM

## 2023-03-09 LAB
CHOLESTEROL, TOTAL: 215 MG/DL (ref 160–199)
FOLLICLE STIMULATING HORMONE: 7.6 MIU/ML
HDLC SERPL-MCNC: 62 MG/DL (ref 65–121)
LDL CHOLESTEROL CALCULATED: 140 MG/DL
LUTEINIZING HORMONE: 9.4 MIU/ML
PROLACTIN: 8.24 NG/ML (ref 4.79–23.3)
T4 FREE: 1.11 NG/DL (ref 0.93–1.7)
TRIGL SERPL-MCNC: 64 MG/DL (ref 0–149)
TSH SERPL DL<=0.05 MIU/L-ACNC: 0.82 UIU/ML (ref 0.27–4.2)

## 2023-03-11 LAB — DHEAS (DHEA SULFATE): 174 UG/DL (ref 61–337)

## 2023-03-12 LAB — TESTOSTERONE, FEMALES/CHILDREN: 50 NG/DL (ref 9–55)

## 2023-03-21 NOTE — PATIENT INSTRUCTIONS
fruits and vegetables. You could add vegetables to sandwiches or add fruit to cereal.   Drink water when you are thirsty. Limit soda, juice, and sports drinks. Try to exercise most days. Aim for at least 2½ hours of exercise each week. Keep moving. Work in the garden or take your dog on a walk. Use the stairs instead of the elevator. If you use tobacco or nicotine, try to quit. Ask your doctor about programs and medicines to help you quit. Limit alcohol. Men should have no more than 2 drinks a day. Women should have no more than 1. For some people, no alcohol is the best choice. Follow-up care is a key part of your treatment and safety. Be sure to make and go to all appointments, and call your doctor if you are having problems. It's also a good idea to know your test results and keep a list of the medicines you take. Where can you learn more? Go to http://www.glover.com/ and enter U807 to learn more about \"A Healthy Lifestyle: Care Instructions. \"  Current as of: November 14, 2022               Content Version: 13.6  © 8937-7265 [employer]. Care instructions adapted under license by [facility] ([facility]). If you have questions about a medical condition or this instruction, always ask your healthcare professional. [address] any warranty or liability for your use of this information. Patient Education        Body Mass Index: Care Instructions  Your Care Instructions     Body mass index (BMI) can help you see if your weight is raising your risk for health problems. It uses a formula to compare how much you weigh with how tall you are. A BMI lower than 18.5 is considered underweight. A BMI between 18.5 and 24.9 is considered healthy. A BMI between 25 and 29.9 is considered overweight. A BMI of 30 or higher is considered obese. If your BMI is in the normal range, it means that you have a lower risk for weight-related health problems.  If your BMI is in

## 2023-03-22 ENCOUNTER — OFFICE VISIT (OUTPATIENT)
Dept: OBGYN CLINIC | Age: 38
End: 2023-03-22
Payer: MEDICAID

## 2023-03-22 VITALS
DIASTOLIC BLOOD PRESSURE: 70 MMHG | SYSTOLIC BLOOD PRESSURE: 120 MMHG | BODY MASS INDEX: 30.82 KG/M2 | WEIGHT: 185 LBS | HEIGHT: 65 IN

## 2023-03-22 DIAGNOSIS — Z12.31 ENCOUNTER FOR SCREENING MAMMOGRAM FOR MALIGNANT NEOPLASM OF BREAST: ICD-10-CM

## 2023-03-22 DIAGNOSIS — Z30.09 BIRTH CONTROL COUNSELING: Primary | ICD-10-CM

## 2023-03-22 DIAGNOSIS — Z01.419 ENCOUNTER FOR CERVICAL PAP SMEAR WITH PELVIC EXAM: ICD-10-CM

## 2023-03-22 DIAGNOSIS — Z11.51 ENCOUNTER FOR SCREENING FOR HUMAN PAPILLOMAVIRUS (HPV): ICD-10-CM

## 2023-03-22 DIAGNOSIS — Z30.432 ENCOUNTER FOR IUD REMOVAL: ICD-10-CM

## 2023-03-22 DIAGNOSIS — Z12.4 ENCOUNTER FOR SCREENING FOR CERVICAL CANCER: ICD-10-CM

## 2023-03-22 PROCEDURE — 58301 REMOVE INTRAUTERINE DEVICE: CPT | Performed by: ADVANCED PRACTICE MIDWIFE

## 2023-03-22 PROCEDURE — G8484 FLU IMMUNIZE NO ADMIN: HCPCS | Performed by: ADVANCED PRACTICE MIDWIFE

## 2023-03-22 PROCEDURE — 99395 PREV VISIT EST AGE 18-39: CPT | Performed by: ADVANCED PRACTICE MIDWIFE

## 2023-03-22 NOTE — PROGRESS NOTES
Pt presents today for pap smear and breast exam.    She also complains of     Last mammogram: A few years ago  Last pap smear: 3/15/2022 Negative   Sexually active: Yes  Sexual preference: Male  Contraception: paraguard  : 2  Para: 1  AB: 1  Last bone density: never   Last colonoscopy: never  Menarche: age 13  LMP: 3/1/2023  Menses: irregular
visualized at cervical os. Strings grasped with ring forceps and gentle retraction placed. Intact IUD removed without difficulty. Pt tolerated procedure well. Alternative form of contraception and/or family planning discussed and all questions answered. MEDICATIONS:  No orders of the defined types were placed in this encounter. ORDERS:  No orders of the defined types were placed in this encounter. PLAN:  WWE - PAP w HPV collected. SBE reviewed and CBE performed. NO annual labs today. Mammogram ordered.    IUD removed

## 2023-03-24 LAB
HPV HR 12 DNA SPEC QL NAA+PROBE: NOT DETECTED
HPV16 DNA SPEC QL NAA+PROBE: NOT DETECTED
HPV16+18+H RISK 12 DNA SPEC-IMP: NORMAL
HPV18 DNA SPEC QL NAA+PROBE: NOT DETECTED

## 2023-05-10 ENCOUNTER — TELEPHONE (OUTPATIENT)
Dept: PRIMARY CARE CLINIC | Age: 38
End: 2023-05-10

## 2023-05-10 NOTE — TELEPHONE ENCOUNTER
Pt asking for an Order for a Mask for her C-pap. For Sleep Apnea.   Needs faxed to [facility].

## 2023-09-21 ENCOUNTER — TRANSCRIBE ORDERS (OUTPATIENT)
Dept: ADMINISTRATIVE | Age: 38
End: 2023-09-21

## 2023-09-21 DIAGNOSIS — N94.89 PELVIC CONGESTION SYNDROME: Primary | ICD-10-CM

## 2023-11-02 ENCOUNTER — OFFICE VISIT (OUTPATIENT)
Dept: PRIMARY CARE CLINIC | Age: 38
End: 2023-11-02
Payer: MEDICAID

## 2023-11-02 VITALS
BODY MASS INDEX: 35.58 KG/M2 | OXYGEN SATURATION: 99 % | WEIGHT: 208.4 LBS | SYSTOLIC BLOOD PRESSURE: 124 MMHG | RESPIRATION RATE: 18 BRPM | HEIGHT: 64 IN | HEART RATE: 73 BPM | TEMPERATURE: 97.6 F | DIASTOLIC BLOOD PRESSURE: 84 MMHG

## 2023-11-02 DIAGNOSIS — F41.9 ANXIETY AND DEPRESSION: ICD-10-CM

## 2023-11-02 DIAGNOSIS — E28.2 PCOS (POLYCYSTIC OVARIAN SYNDROME): Primary | ICD-10-CM

## 2023-11-02 DIAGNOSIS — D64.9 ANEMIA, UNSPECIFIED TYPE: ICD-10-CM

## 2023-11-02 DIAGNOSIS — G47.33 OBSTRUCTIVE SLEEP APNEA: ICD-10-CM

## 2023-11-02 DIAGNOSIS — F32.A DEPRESSION, UNSPECIFIED DEPRESSION TYPE: ICD-10-CM

## 2023-11-02 DIAGNOSIS — D64.9 LOW HEMOGLOBIN: Primary | ICD-10-CM

## 2023-11-02 DIAGNOSIS — F32.A ANXIETY AND DEPRESSION: ICD-10-CM

## 2023-11-02 LAB
25(OH)D3 SERPL-MCNC: 34.4 NG/ML
ACANTHOCYTES BLD QL SMEAR: ABNORMAL
ALBUMIN SERPL-MCNC: 4.2 G/DL (ref 3.5–5.2)
ALP SERPL-CCNC: 69 U/L (ref 35–104)
ALT SERPL-CCNC: 14 U/L (ref 5–33)
ANION GAP SERPL CALCULATED.3IONS-SCNC: 12 MMOL/L (ref 7–19)
AST SERPL-CCNC: 16 U/L (ref 5–32)
BASOPHILS # BLD: 0.1 K/UL (ref 0–0.2)
BASOPHILS NFR BLD: 2 % (ref 0–1)
BILIRUB SERPL-MCNC: <0.2 MG/DL (ref 0.2–1.2)
BUN SERPL-MCNC: 15 MG/DL (ref 6–20)
CALCIUM SERPL-MCNC: 8.8 MG/DL (ref 8.6–10)
CHLORIDE SERPL-SCNC: 103 MMOL/L (ref 98–111)
CHOLEST SERPL-MCNC: 225 MG/DL (ref 160–199)
CO2 SERPL-SCNC: 26 MMOL/L (ref 22–29)
CREAT SERPL-MCNC: 0.7 MG/DL (ref 0.5–0.9)
EOSINOPHIL # BLD: 0.1 K/UL (ref 0–0.6)
EOSINOPHIL NFR BLD: 2.4 % (ref 0–5)
ERYTHROCYTE [DISTWIDTH] IN BLOOD BY AUTOMATED COUNT: 17.1 % (ref 11.5–14.5)
GLUCOSE SERPL-MCNC: 98 MG/DL (ref 74–109)
HBA1C MFR BLD: 5.4 % (ref 4–6)
HCT VFR BLD AUTO: 35.1 % (ref 37–47)
HDLC SERPL-MCNC: 72 MG/DL (ref 65–121)
HGB BLD-MCNC: 9.9 G/DL (ref 12–16)
HYPOCHROMIA BLD QL SMEAR: ABNORMAL
IMM GRANULOCYTES # BLD: 0 K/UL
IRON SATN MFR SERPL: 4 % (ref 14–50)
IRON SERPL-MCNC: 19 UG/DL (ref 37–145)
LDLC SERPL CALC-MCNC: 144 MG/DL
LYMPHOCYTES # BLD: 1.8 K/UL (ref 1.1–4.5)
LYMPHOCYTES NFR BLD: 36.5 % (ref 20–40)
MCH RBC QN AUTO: 19.6 PG (ref 27–31)
MCHC RBC AUTO-ENTMCNC: 28.2 G/DL (ref 33–37)
MCV RBC AUTO: 69.5 FL (ref 81–99)
MONOCYTES # BLD: 0.4 K/UL (ref 0–0.9)
MONOCYTES NFR BLD: 7.6 % (ref 0–10)
NEUTROPHILS # BLD: 2.6 K/UL (ref 1.5–7.5)
NEUTS SEG NFR BLD: 51.3 % (ref 50–65)
OVALOCYTES BLD QL SMEAR: ABNORMAL
PLATELET # BLD AUTO: 411 K/UL (ref 130–400)
PLATELET SLIDE REVIEW: ABNORMAL
PMV BLD AUTO: 9.2 FL (ref 9.4–12.3)
POTASSIUM SERPL-SCNC: 4.3 MMOL/L (ref 3.5–5)
PROT SERPL-MCNC: 7.5 G/DL (ref 6.6–8.7)
RBC # BLD AUTO: 5.05 M/UL (ref 4.2–5.4)
SODIUM SERPL-SCNC: 141 MMOL/L (ref 136–145)
TIBC SERPL-MCNC: 526 UG/DL (ref 250–400)
TRIGL SERPL-MCNC: 47 MG/DL (ref 0–149)
TSH SERPL DL<=0.005 MIU/L-ACNC: 1.05 UIU/ML (ref 0.35–5.5)
VIT B12 SERPL-MCNC: 866 PG/ML (ref 211–946)
WBC # BLD AUTO: 5 K/UL (ref 4.8–10.8)

## 2023-11-02 PROCEDURE — 99213 OFFICE O/P EST LOW 20 MIN: CPT | Performed by: FAMILY MEDICINE

## 2023-11-02 PROCEDURE — G8427 DOCREV CUR MEDS BY ELIG CLIN: HCPCS | Performed by: FAMILY MEDICINE

## 2023-11-02 PROCEDURE — G8417 CALC BMI ABV UP PARAM F/U: HCPCS | Performed by: FAMILY MEDICINE

## 2023-11-02 PROCEDURE — G8484 FLU IMMUNIZE NO ADMIN: HCPCS | Performed by: FAMILY MEDICINE

## 2023-11-02 PROCEDURE — 1036F TOBACCO NON-USER: CPT | Performed by: FAMILY MEDICINE

## 2023-11-02 RX ORDER — FERROUS SULFATE 325(65) MG
325 TABLET ORAL 2 TIMES DAILY
Qty: 180 TABLET | Refills: 1 | Status: SHIPPED | OUTPATIENT
Start: 2023-11-02

## 2023-11-02 RX ORDER — CLOPIDOGREL BISULFATE 75 MG/1
75 TABLET ORAL DAILY
COMMUNITY

## 2023-11-02 ASSESSMENT — ENCOUNTER SYMPTOMS
WHEEZING: 0
ABDOMINAL PAIN: 0
COLOR CHANGE: 0
VOMITING: 0
DIARRHEA: 0
COUGH: 0
BACK PAIN: 0
NAUSEA: 0
EYE DISCHARGE: 0

## 2023-11-02 NOTE — PROGRESS NOTES
(polycystic ovarian syndrome)  -     Comprehensive Metabolic Panel  -     CBC with Auto Differential  -     Lipid Panel  -     Hemoglobin A1C  -     TSH with Reflex to FT4  -     Vitamin B12  -     Vitamin D 25 Hydroxy  2. Anxiety and depression  -     Comprehensive Metabolic Panel  -     CBC with Auto Differential  -     Lipid Panel  -     Hemoglobin A1C  -     TSH with Reflex to FT4  -     Vitamin B12  -     Vitamin D 25 Hydroxy  -     [address], Lady Cook, APRN, [address] ([address])  3. Depression, unspecified depression type  -     Comprehensive Metabolic Panel  -     CBC with Auto Differential  -     Lipid Panel  -     Hemoglobin A1C  -     TSH with Reflex to FT4  -     Vitamin B12  -     Vitamin D 25 Hydroxy  4. Obstructive sleep apnea       Labs were ordered today. We will notify her of the results of those we receive them back. I am going to put a referral in for [facility] behavioral health for them to see her to see if they can help with her anxiety and depression as she does not feel like she is getting much out of the psychiatrist that she is seeing currently. I did encourage her to work on diet and exercise. We will notify her of the results of her labs and determine if there is anything off that she might be able to do any medications help with weight loss but discussed that if her A1c was good and sugars are good that insurance likely would not cover any of the GLP-1's for weight loss. PDMP Monitoring:    Last PDMP H[place] as Reviewed [facility]):  Review User Review Instant Review Result            Urine Drug Screenings (1 yr)    No resulted procedures found. Medication Contract and Consent for Opioid Use Documents Filed        No documents found                     EMR Dragon/transcription disclaimer:  Much of this encounter note is electronic transcription/translation of spoken language toprinted texts.   The electronic translation of spoken language may be erroneous, or at

## 2023-11-03 ENCOUNTER — TELEPHONE (OUTPATIENT)
Dept: PSYCHIATRY | Age: 38
End: 2023-11-03

## 2023-11-03 ENCOUNTER — HOSPITAL ENCOUNTER (OUTPATIENT)
Dept: CT IMAGING | Age: 38
Discharge: HOME | End: 2023-11-03
Attending: SURGERY
Payer: MEDICAID

## 2023-11-03 DIAGNOSIS — N94.89 PELVIC CONGESTION SYNDROME: ICD-10-CM

## 2023-11-03 PROCEDURE — 6360000004 HC RX CONTRAST MEDICATION: Performed by: SURGERY

## 2023-11-03 PROCEDURE — 74177 CT ABD & PELVIS W/CONTRAST: CPT

## 2023-11-03 RX ADMIN — IOPAMIDOL 75 ML: 755 INJECTION, SOLUTION INTRAVENOUS at 12:28

## 2023-11-03 NOTE — TELEPHONE ENCOUNTER
Called pt to schedule an appt from a referral.    No answer and LVM.     Electronically signed by Indio Bagley MA on 11/3/2023 at 11:58 AM

## 2023-11-08 ENCOUNTER — TELEPHONE (OUTPATIENT)
Dept: PRIMARY CARE CLINIC | Age: 38
End: 2023-11-08

## 2023-11-08 DIAGNOSIS — Z76.89 ENCOUNTER TO ESTABLISH CARE: Primary | ICD-10-CM

## 2023-11-21 ENCOUNTER — TELEPHONE (OUTPATIENT)
Dept: PSYCHIATRY | Age: 38
End: 2023-11-21

## 2023-11-21 NOTE — TELEPHONE ENCOUNTER
Called pt to schedule an appt from a referral.    Pt stated that she is not home and does not have her calendar in front of her. Pt stated that she will call back to schedule an appt.     Electronically signed by Laya Shelton MA on 11/21/2023 at 10:50 AM

## 2023-11-24 ENCOUNTER — TELEPHONE (OUTPATIENT)
Dept: PSYCHIATRY | Age: 38
End: 2023-11-24

## 2023-11-24 NOTE — TELEPHONE ENCOUNTER
Called pt to schedule an appt from a referral.    No answer and LVM.       Electronically signed by Nunu Du MA on 11/24/2023 at 3:28 PM

## 2023-12-04 ENCOUNTER — TELEPHONE (OUTPATIENT)
Dept: PRIMARY CARE CLINIC | Age: 38
End: 2023-12-04

## 2023-12-04 NOTE — TELEPHONE ENCOUNTER
----- Message from Karli Daily sent at 12/4/2023 12:22 PM CST -----  Subject: Message to Provider    QUESTIONS  Information for Provider? pt was not able to get the previous medication   but wanted to see about getting a rx for the Zepbound please call once   this has been approved   ---------------------------------------------------------------------------  --------------  [address]  [phone]; OK to leave message on voicemail  ---------------------------------------------------------------------------  --------------  SCRIPT ANSWERS  Relationship to Patient?  Self

## 2023-12-22 ENCOUNTER — OFFICE VISIT (OUTPATIENT)
Dept: GASTROENTEROLOGY | Age: 38
End: 2023-12-22
Payer: MEDICAID

## 2023-12-22 VITALS
SYSTOLIC BLOOD PRESSURE: 115 MMHG | OXYGEN SATURATION: 98 % | HEIGHT: 64 IN | BODY MASS INDEX: 35.17 KG/M2 | WEIGHT: 206 LBS | HEART RATE: 84 BPM | DIASTOLIC BLOOD PRESSURE: 70 MMHG

## 2023-12-22 DIAGNOSIS — D64.9 ANEMIA, UNSPECIFIED TYPE: Primary | ICD-10-CM

## 2023-12-22 PROCEDURE — 99204 OFFICE O/P NEW MOD 45 MIN: CPT | Performed by: NURSE PRACTITIONER

## 2023-12-22 PROCEDURE — 1036F TOBACCO NON-USER: CPT | Performed by: NURSE PRACTITIONER

## 2023-12-22 PROCEDURE — G8484 FLU IMMUNIZE NO ADMIN: HCPCS | Performed by: NURSE PRACTITIONER

## 2023-12-22 PROCEDURE — G8427 DOCREV CUR MEDS BY ELIG CLIN: HCPCS | Performed by: NURSE PRACTITIONER

## 2023-12-22 PROCEDURE — G8417 CALC BMI ABV UP PARAM F/U: HCPCS | Performed by: NURSE PRACTITIONER

## 2023-12-22 RX ORDER — TRAZODONE HYDROCHLORIDE 150 MG/1
1 TABLET ORAL NIGHTLY PRN
COMMUNITY

## 2023-12-22 NOTE — PROGRESS NOTES
Laterality Date     SECTION      GASTRIC RESTRICTION SURGERY  2019    TONSILLECTOMY      VASCULAR SURGERY Left 2020    VI- L GSV RFA    VASCULAR SURGERY  2021    VI. Venogram of IVC and bilateral common and external iliac veins. Placement of left common iliac stent wallstent 18 x 19. intrasvascular ultrasound of ivc, bilateral common and external iliac veins. VASCULAR SURGERY  2021    VI. Left iliac venogram.       Family History   Problem Relation Age of Onset    Diabetes Mother     Thyroid Disease Mother         had part of her thyroid removed    Cancer Sister         skin cancer    Leukemia Sister     Heart Disease Sister     Breast Cancer Sister 24    Celiac Disease Sister     Other Sister         lyme dz    Thyroid Disease Sister     Lung Cancer Maternal Grandfather     Cancer Maternal Grandfather         throat cancer    Colon Cancer Neg Hx     Colon Polyps Neg Hx     Esophageal Cancer Neg Hx     Liver Cancer Neg Hx     Liver Disease Neg Hx     Rectal Cancer Neg Hx     Stomach Cancer Neg Hx        Social History     Socioeconomic History    Marital status:    Tobacco Use    Smoking status: Former     Current packs/day: 0.00     Types: Cigarettes     Quit date: 2010     Years since quittin.6    Smokeless tobacco: Never   Vaping Use    Vaping Use: Never used   Substance and Sexual Activity    Alcohol use: No    Drug use: Yes     Types: Marijuana (Thai Sida), Cocaine, IV     Comment: Last used 2010    Sexual activity: Yes     Partners: Male     Birth control/protection: I.U.D.      Social Determinants of Health     Financial Resource Strain: Low Risk  (2023)    Overall Financial Resource Strain (CARDIA)     Difficulty of Paying Living Expenses: Not hard at all       Current Outpatient Medications   Medication Sig Dispense Refill    lamoTRIgine (LAMICTAL) 100 MG tablet Take 1 tablet by mouth daily      Dexmethylphenidate HCl ER 10 MG CP24 Take 1 capsule by mouth as

## 2024-01-05 ENCOUNTER — TELEPHONE (OUTPATIENT)
Dept: GASTROENTEROLOGY | Age: 39
End: 2024-01-05

## 2024-01-05 NOTE — TELEPHONE ENCOUNTER
Called patient to remind them of their procedure with Dr. BROWNE at [facility]  on 1/10/24  to arrive at 1100     RESPONSE:  LANCE

## 2024-01-10 ENCOUNTER — APPOINTMENT (OUTPATIENT)
Dept: OPERATING ROOM | Age: 39
End: 2024-01-10
Attending: INTERNAL MEDICINE

## 2024-01-10 ENCOUNTER — ANESTHESIA EVENT (OUTPATIENT)
Dept: OPERATING ROOM | Age: 39
End: 2024-01-10

## 2024-01-10 ENCOUNTER — ANESTHESIA (OUTPATIENT)
Dept: OPERATING ROOM | Age: 39
End: 2024-01-10

## 2024-01-10 ENCOUNTER — HOSPITAL ENCOUNTER (OUTPATIENT)
Age: 39
Setting detail: SPECIMEN
Discharge: HOME | End: 2024-01-10
Payer: MEDICAID

## 2024-01-10 ENCOUNTER — HOSPITAL ENCOUNTER (OUTPATIENT)
Age: 39
Setting detail: OUTPATIENT SURGERY
Discharge: HOME | End: 2024-01-10
Attending: INTERNAL MEDICINE | Admitting: INTERNAL MEDICINE
Payer: MEDICAID

## 2024-01-10 VITALS
TEMPERATURE: 97 F | DIASTOLIC BLOOD PRESSURE: 55 MMHG | OXYGEN SATURATION: 99 % | RESPIRATION RATE: 16 BRPM | BODY MASS INDEX: 33.63 KG/M2 | SYSTOLIC BLOOD PRESSURE: 106 MMHG | WEIGHT: 197 LBS | HEART RATE: 93 BPM | HEIGHT: 64 IN

## 2024-01-10 PROCEDURE — 45378 DIAGNOSTIC COLONOSCOPY: CPT

## 2024-01-10 PROCEDURE — 88305 TISSUE EXAM BY PATHOLOGIST: CPT

## 2024-01-10 PROCEDURE — 88342 IMHCHEM/IMCYTCHM 1ST ANTB: CPT

## 2024-01-10 PROCEDURE — 43239 EGD BIOPSY SINGLE/MULTIPLE: CPT | Performed by: INTERNAL MEDICINE

## 2024-01-10 PROCEDURE — 43239 EGD BIOPSY SINGLE/MULTIPLE: CPT

## 2024-01-10 PROCEDURE — 45378 DIAGNOSTIC COLONOSCOPY: CPT | Performed by: INTERNAL MEDICINE

## 2024-01-10 RX ORDER — SODIUM CHLORIDE, SODIUM LACTATE, POTASSIUM CHLORIDE, CALCIUM CHLORIDE 600; 310; 30; 20 MG/100ML; MG/100ML; MG/100ML; MG/100ML
INJECTION, SOLUTION INTRAVENOUS CONTINUOUS
Status: DISCONTINUED | OUTPATIENT
Start: 2024-01-10 | End: 2024-01-10 | Stop reason: HOSPADM

## 2024-01-10 RX ORDER — LIDOCAINE HYDROCHLORIDE 10 MG/ML
INJECTION, SOLUTION EPIDURAL; INFILTRATION; INTRACAUDAL; PERINEURAL PRN
Status: DISCONTINUED | OUTPATIENT
Start: 2024-01-10 | End: 2024-01-10 | Stop reason: SDUPTHER

## 2024-01-10 RX ORDER — ACETAMINOPHEN 325 MG/1
650 TABLET ORAL ONCE AS NEEDED
Status: COMPLETED | OUTPATIENT
Start: 2024-01-10 | End: 2024-01-10

## 2024-01-10 RX ORDER — PROPOFOL 10 MG/ML
INJECTION, EMULSION INTRAVENOUS PRN
Status: DISCONTINUED | OUTPATIENT
Start: 2024-01-10 | End: 2024-01-10 | Stop reason: SDUPTHER

## 2024-01-10 RX ADMIN — ACETAMINOPHEN 650 MG: 325 TABLET ORAL at 12:54

## 2024-01-10 RX ADMIN — SODIUM CHLORIDE, SODIUM LACTATE, POTASSIUM CHLORIDE, CALCIUM CHLORIDE: 600; 310; 30; 20 INJECTION, SOLUTION INTRAVENOUS at 11:07

## 2024-01-10 RX ADMIN — PROPOFOL 50 MG: 10 INJECTION, EMULSION INTRAVENOUS at 12:05

## 2024-01-10 RX ADMIN — PROPOFOL 50 MG: 10 INJECTION, EMULSION INTRAVENOUS at 12:07

## 2024-01-10 RX ADMIN — PROPOFOL 50 MG: 10 INJECTION, EMULSION INTRAVENOUS at 12:16

## 2024-01-10 RX ADMIN — LIDOCAINE HYDROCHLORIDE 5 ML: 10 INJECTION, SOLUTION EPIDURAL; INFILTRATION; INTRACAUDAL; PERINEURAL at 12:05

## 2024-01-10 RX ADMIN — PROPOFOL 50 MG: 10 INJECTION, EMULSION INTRAVENOUS at 12:11

## 2024-01-10 RX ADMIN — PROPOFOL 50 MG: 10 INJECTION, EMULSION INTRAVENOUS at 12:20

## 2024-01-10 RX ADMIN — PROPOFOL 50 MG: 10 INJECTION, EMULSION INTRAVENOUS at 12:06

## 2024-01-10 ASSESSMENT — PAIN - FUNCTIONAL ASSESSMENT: PAIN_FUNCTIONAL_ASSESSMENT: 0-10

## 2024-01-10 NOTE — ANESTHESIA PRE PROCEDURE
Department of Anesthesiology  Preprocedure Note       Name:  Becky Álvarez   Age:  38 y.o.  :  1985                                          MRN:  [id number]         Date:  1/10/2024      Surgeon: Surgeon(s):  Thompson Arana MD    Procedure: Procedure(s):  EGD BIOPSY  COLORECTAL CANCER SCREENING, NOT HIGH RISK    Medications prior to admission:   Prior to Admission medications    Medication Sig Start Date End Date Taking? Authorizing Provider   traZODone (DESYREL) 150 MG tablet Take 1 tablet by mouth nightly as needed for Sleep    Brigitte Orozco MD   lamoTRIgine (LAMICTAL) 100 MG tablet Take 1 tablet by mouth daily 22   Brigitte Orozco MD   Dexmethylphenidate HCl ER 10 MG CP24 Take 1 capsule by mouth as needed.    Brigitte Orozco MD   Multiple Vitamin (MULTI-VITAMINS PO) Take 1 tablet by mouth daily     Brigitte Orozco MD   escitalopram (LEXAPRO) 20 MG tablet Take 1 tablet by mouth daily Take 1 in half tab daily to equal 25mg    Brigitte Orozco MD       Current medications:    No current facility-administered medications for this encounter.     Current Outpatient Medications   Medication Sig Dispense Refill   • traZODone (DESYREL) 150 MG tablet Take 1 tablet by mouth nightly as needed for Sleep     • lamoTRIgine (LAMICTAL) 100 MG tablet Take 1 tablet by mouth daily     • Dexmethylphenidate HCl ER 10 MG CP24 Take 1 capsule by mouth as needed.     • Multiple Vitamin (MULTI-VITAMINS PO) Take 1 tablet by mouth daily      • escitalopram (LEXAPRO) 20 MG tablet Take 1 tablet by mouth daily Take 1 in half tab daily to equal 25mg         Allergies:    Allergies   Allergen Reactions   • Adhesive Tape        Problem List:    Patient Active Problem List   Diagnosis Code   • Maternal MCV (mean corpuscular volume) low [id number]   • Cancer screening Z12.9   • Anxiety and depression F41.9, F32.A   • History of hepatitis C virus infection Z86.19   • PCOS (polycystic ovarian syndrome)

## 2024-01-10 NOTE — OP NOTE
Endoscopic Procedure Note    Patient: Becky Álvarez : 1985  Med Rec#: [id number] Acc#: [id number]     Primary Care Provider Katie Perez MD    Endoscopist: Thompson Arana MD, MD    Date of Procedure:  1/10/2024    Procedure:   1. EGD with cold biopsies    Indications:   Microcytic hypochromic iron deficiency anemia with suspected GI blood loss  History of bariatric surgery-sleeve gastrectomy  History of hepatitis C    Anesthesia:  Sedation was administered by anesthesia who monitored the patient during the procedure.    Estimated Blood Loss: minimal    Procedure:   After reviewing the patient's chart and obtaining informed consent, the patient was placed in the left lateral decubitus position.  A forward-viewing Olympus endoscope was lubricated and inserted through the mouth into the oropharynx. Under direct visualization, the upper esophagus was intubated. The scope was advanced to the level of the third portion of duodenum. Scope was slowly withdrawn with careful inspection of the mucosal surfaces. The scope was retroflexed for inspection of the gastric fundus and incisura. Findings and maneuvers are listed in impression below. The patient tolerated the procedure well. The scope was removed. There were no immediate complications.    Findings/IMPRESSION:  Esophagus: normal and a normal EG junction at 36 cm.  There is no obvious hiatal hernia present.      Stomach:  abnormal: Postoperative changes consistent with her history of sleeve gastrectomy/bariatric surgery with the narrowed gastric lumen corresponding to the body of the stomach.  Also patchy mucosal changes with multiple areas of linear erythema in the antrum as well as discrete 2 to 3 mm erosions in the antrum and along the surgical suture/staple line and the greater curvature of the stomach suggestive of moderate erosive gastritis noted -  Gastric biopsies were taken from the antrum and body to rule out Helicobacter

## 2024-01-10 NOTE — DISCHARGE INSTRUCTIONS
1.  Await path results, the patient will be contacted in 7-10 days with biopsy results.   2.  Avoid NSAIDs  3.  May use Tylenol 500 mg p.o. every 4 hours as needed  4.  Continue medications for GERD along with anti-GERD measures  5.  Continue attempts at weight loss by following a bariatric low-carb low-fat diet strictly    6. Repeat colonoscopy:  -Based on colonoscopy findings today and prep quality, in 5 years to begin colon cancer screening; sooner if her personal or family history as pertaining to colorectal cancer risk changes requiring an earlier exam or if the patient were to develop lower GI symptoms such as bleeding, abdominal pain, change in bowel habits or stool caliber or if the patient has anemia or unexplained weight loss in the future.   7.- Resume previous meds and diet  - GI clinic f/u 6-8 weeks with MsImmanuel Mary  - Keep scheduled f/u appts with other MDs      POST-OP ORDERS: ENDOSCOPY & COLONOSCOPY:    1. Rest today.    2. DO NOT eat or drink until wide awake; eat your usual diet today in moderate amount only.    3. DO NOT drive today.    4. Call physician if you have severe pain, vomiting, fever, rectal bleeding or black bowel movements.    5.  If a biopsy was taken or a polyp removed, you should expect to hear results in about 21 days.  If you have heard nothing from your physician by then, call the office for results.    6.  Discharge home when patient awake, vitals signs stable and tolerating liquids.    7. Call with questions or concerns [phone].

## 2024-01-10 NOTE — H&P
Patient Name: Becky Álvarez  : 1985  MRN: [id number]  DATE: 01/10/24    Allergies:   Allergies   Allergen Reactions    Adhesive Tape         ENDOSCOPY  History and Physical    Procedure:    [x] Diagnostic Colonoscopy       [] Screening Colonoscopy  [x] EGD      [] ERCP      [] EUS       [] Other    [x] Previous office notes/History and Physical reviewed from the patients chart. Please see EMR for further details of HPI. I have examined the patient's status immediately prior to the procedure and:      Indications/HPI:     Microcytic hypochromic iron deficiency anemia with suspected GI blood loss    []Abdominal Pain   []Cancer- GI/Lung     []Fhx of colon CA/polyps  []History of Polyps  []Barretts            []Melena  []Abnormal Imaging              []Dysphagia              []Persistent Pneumonia   []Anemia                            []Food Impaction        []History of Polyps  [] GI Bleed             []Pulmonary nodule/Mass   []Change in bowel habits []Heartburn/Reflux  []Rectal Bleed (BRBPR)  []Chest Pain - Non Cardiac []Heme (+) Stool []Ulcers  []Constipation  []Hemoptysis  []Varices  []Diarrhea  []Hypoxemia    []Nausea/Vomiting   []Screening   []Crohns/Colitis  []Other:     Anesthesia:   [x] MAC [] Moderate Sedation   [] General   [] None     ROS: 12 pt Review of Symptoms was negative unless mentioned above    Medications:   Prior to Admission medications    Medication Sig Start Date End Date Taking? Authorizing Provider   traZODone (DESYREL) 150 MG tablet Take 1 tablet by mouth nightly as needed for Sleep    ProviderBrigitte MD   lamoTRIgine (LAMICTAL) 100 MG tablet Take 1 tablet by mouth daily 22   ProviderBrigitte MD   Dexmethylphenidate HCl ER 10 MG CP24 Take 1 capsule by mouth as needed.    Brigitte Orozco MD   Multiple Vitamin (MULTI-VITAMINS PO) Take 1 tablet by mouth daily     Brigitte Orozco MD   escitalopram (LEXAPRO) 20 MG tablet Take 1 tablet by mouth daily Take 1

## 2024-01-10 NOTE — OP NOTE
Patient: Becky Álvarez : 1985  Med Rec#: [id number] Acc#: [id number]   Primary Care Provider Katie Perez MD    Date of Procedure:  1/10/2024    Endoscopist: Thompson Arana MD, MD    Referring Provider: Katie Perez MD,     Operation Performed: Colonoscopy up to the terminal ileum    Indications:     Microcytic hypochromic iron deficiency anemia with suspected GI blood loss    Anesthesia:  Sedation was administered by anesthesia who monitored the patient during the procedure.    I met with Becky Álvarez prior to procedure. We discussed the procedure itself, and I have discussed the risks of endoscopy (including-- but not limited to-- pain, discomfort, bleeding potentially requiring second endoscopic procedure and/or blood transfusion, organ perforation requiring operative repair, damage to organs near the colon, infection, aspiration, cardiopulmonary/allergic reaction), benefits, indications to endoscopy. Additionally, we discussed options other than colonoscopy. The patient expressed understanding. All questions answered. The patient decided to proceed with the procedure.  Signed informed consent was placed on the chart.    Blood Loss: minimal    Withdrawal time: More than 9 minutes  Bowel Prep: Fair  with small amounts of thick solid and semisolid stool and a moderate amount of thick, opaque liquid scattered in patchy segments throughout the colon obscuring the underlying mucosa.Lesions including polyps may have been missed.     Complications: no immediate complications    DESCRIPTION OF PROCEDURE:     A time out was performed. After written informed consent was obtained, the patient was placed in the left lateral position.     The perianal area was inspected, and a digital rectal exam was performed. A rectal exam was performed: normal tone, no palpable lesions. At this point, a forward viewing Olympus colonoscope was inserted into the anus and carefully

## 2024-01-10 NOTE — ANESTHESIA POSTPROCEDURE EVALUATION
Department of Anesthesiology  Postprocedure Note    Patient: Becky Álvarez  MRN: [id number]  YOB: 1985  Date of evaluation: 1/10/2024    Procedure Summary     Date: 01/10/24 Room / Location: [place] [address] / [facility]    Anesthesia Start: 1202 Anesthesia Stop:     Procedures:       EGD BIOPSY (Esophagus)      COLORECTAL CANCER SCREENING, NOT HIGH RISK (Abdomen) Diagnosis:       Anemia, unspecified type      (Anemia, unspecified type [D64.9])    Surgeons: Thompson Arana MD Responsible Provider: Lesli Yang APRN - CRNA    Anesthesia Type: general, TIVA ASA Status: 2          Anesthesia Type: No value filed.    Mike Phase I:      Mike Phase II:      Anesthesia Post Evaluation    Patient location during evaluation: bedside  Patient participation: waiting for patient participation  Level of consciousness: sleepy but conscious  Pain score: 0  Airway patency: patent  Nausea & Vomiting: no nausea and no vomiting  Cardiovascular status: blood pressure returned to baseline  Respiratory status: acceptable and room air  Hydration status: euvolemic  Pain management: adequate        No notable events documented.

## 2024-01-25 DIAGNOSIS — Z80.3 FAMILY HISTORY OF MALIGNANT NEOPLASM OF BREAST: ICD-10-CM

## 2024-01-25 DIAGNOSIS — Z80.0 FAMILY HISTORY OF COLON CANCER: ICD-10-CM

## 2024-04-11 NOTE — PROGRESS NOTES
Luz Maria Christian is a 43 y.o. female evaluated via telephone on 3/25/2022 for No chief complaint on file. Documentation:  I communicated with the patient and/or health care decision maker about the results of left leg venous scan status post left lesser saphenous vein radiofrequency ablation. Details of this discussion including any medical advice provided: Mrs. Delmy Kunz is a 47-year-old female who has a history of chronic venous insufficiency who has underwent left common iliac vein stenting. She underwent radiofrequency ablation of the left lesser saphenous vein on 3/18/2022 by Dr. Antnoio Smyth. Today we are following up. She denies any significant pain swelling, fever or chills post procedure. Luz Maria Christian is a 43 y.o. female with the following history as [facility] in [facility]:  Patient Active Problem List    Diagnosis Date Noted    PVD (peripheral vascular disease) ([facility] [address]     Leg swelling 02/04/2020    Pain in both lower extremities 02/04/2020    Varicose veins of left lower extremity 02/04/2020    PCOS (polycystic ovarian syndrome) 06/08/2016    Depression (emotion) 06/08/2016    History of hepatitis C virus infection 07/01/2015    Maternal MCV (mean corpuscular volume) low 05/02/2013    Cancer screening 05/02/2013    Anxiety and depression 05/02/2013     Current Outpatient Medications   Medication Sig Dispense Refill    apixaban (ELIQUIS) 2.5 MG TABS tablet Take 1 tablet by mouth twice daily 60 tablet 1    lamoTRIgine (LAMICTAL) 150 MG tablet 75 mg       aspirin 81 MG EC tablet Take 81 mg by mouth daily      traZODone (DESYREL) 150 MG tablet Take 150 mg by mouth nightly      amphetamine-dextroamphetamine (ADDERALL) 10 MG tablet Take 10 mg by mouth daily.  Dr Denisha Schmitt Multiple Vitamin (MULTI-VITAMINS PO) Take 1 tablet by mouth daily       escitalopram (LEXAPRO) 20 MG tablet Take 20 mg by mouth daily Take 1 in half tab daily to equal 25mg       No current facility-administered medications for this visit. Allergies: Adhesive tape  Past Medical History:   Diagnosis Date    Anxiety     Back pain     Depression     Hepatitis C     History of anemia     History of gallstones     History of kidney stones     Knee pain     left    Postpartum depression      Past Surgical History:   Procedure Laterality Date     SECTION      GASTRIC RESTRICTION SURGERY  2019    TONSILLECTOMY      VASCULAR SURGERY Left 2020    VI- L GSV RFA    VASCULAR SURGERY  2021    VI. Venogram of IVC and bilateral common and external iliac veins. Placement of left common iliac stent wallstent 18 x 19. intrasvascular ultrasound of ivc, bilateral common and external iliac veins.  VASCULAR SURGERY  2021    VI. Left iliac venogram.     Family History   Problem Relation Age of Onset    Diabetes Mother     Thyroid Disease Mother         had part of her thyroid removed    Cancer Sister         skin cancer, leukemia    Heart Disease Sister     Breast Cancer Sister     Celiac Disease Sister     Other Sister         lyme dz    Thyroid Disease Sister     Lung Cancer Maternal Grandfather     Cancer Maternal Grandfather         throat cancer    Colon Cancer Neg Hx     Colon Polyps Neg Hx     Esophageal Cancer Neg Hx     Liver Cancer Neg Hx     Liver Disease Neg Hx     Rectal Cancer Neg Hx     Stomach Cancer Neg Hx      Social History     Tobacco Use    Smoking status: Former Smoker     Quit date: 2010     Years since quittin.8    Smokeless tobacco: Never Used   Substance Use Topics    Alcohol use: No       Left leg venous scan-showed left lesser saphenous vein ablated. No DVT/SVT      Assessment:      1. Chronic venous insufficiency      Plan:      Recommend compression stockings and elevation as needed  We will follow-up as needed      Total Time: minutes: 5-10 minutes      Shyam Hatch was evaluated through a synchronous (real-time) audio encounter.  Patient identification was verified at the start of the visit. She (or guardian if applicable) is aware that this is a billable service, which includes applicable co-pays. This visit was conducted with the patient's (and/or legal guardian's) verbal consent. She has not had a related appointment within my department in the past 7 days or scheduled within the next 24 hours. The patient was located in a state where the provider was licensed to provide care.     Note: not billable if this call serves to triage the patient into an appointment for the relevant concern    Frederic Elise PA-C Apixaban/Eliquis - Compliance/Apixaban/Eliquis - Dietary Advice/Apixaban/Eliquis - Follow up monitoring/Apixaban/Eliquis - Potential for adverse drug reactions and interactions

## 2024-07-09 ENCOUNTER — HOSPITAL ENCOUNTER (OUTPATIENT)
Dept: OCCUPATIONAL THERAPY | Age: 39
Setting detail: THERAPIES SERIES
Discharge: HOME | End: 2024-07-09
Payer: MEDICAID

## 2024-07-09 PROCEDURE — 97167 OT EVAL HIGH COMPLEX 60 MIN: CPT

## 2024-07-09 NOTE — PROGRESS NOTES
RO[address] [place] [address]  Dept: [phone]  Loc: [phone]       POC NOTE

## 2025-02-06 ENCOUNTER — OFFICE VISIT (OUTPATIENT)
Dept: PRIMARY CARE CLINIC | Age: 40
End: 2025-02-06
Payer: MEDICAID

## 2025-02-06 ENCOUNTER — PATIENT MESSAGE (OUTPATIENT)
Dept: PRIMARY CARE CLINIC | Age: 40
End: 2025-02-06

## 2025-02-06 VITALS
WEIGHT: 173 LBS | RESPIRATION RATE: 16 BRPM | BODY MASS INDEX: 29.53 KG/M2 | HEIGHT: 64 IN | TEMPERATURE: 97.2 F | SYSTOLIC BLOOD PRESSURE: 114 MMHG | OXYGEN SATURATION: 99 % | DIASTOLIC BLOOD PRESSURE: 80 MMHG | HEART RATE: 74 BPM

## 2025-02-06 DIAGNOSIS — Z12.31 SCREENING MAMMOGRAM FOR BREAST CANCER: ICD-10-CM

## 2025-02-06 DIAGNOSIS — Z00.00 WELLNESS EXAMINATION: ICD-10-CM

## 2025-02-06 DIAGNOSIS — R23.2 HOT FLASHES: ICD-10-CM

## 2025-02-06 DIAGNOSIS — R53.83 FATIGUE, UNSPECIFIED TYPE: ICD-10-CM

## 2025-02-06 DIAGNOSIS — L74.9 SWEATING ABNORMALITY: Primary | ICD-10-CM

## 2025-02-06 DIAGNOSIS — I89.0 LYMPHEDEMA: ICD-10-CM

## 2025-02-06 DIAGNOSIS — L74.9 SWEATING ABNORMALITY: ICD-10-CM

## 2025-02-06 DIAGNOSIS — Z00.00 ENCOUNTER FOR WELL ADULT EXAM WITHOUT ABNORMAL FINDINGS: Primary | ICD-10-CM

## 2025-02-06 DIAGNOSIS — E28.2 PCOS (POLYCYSTIC OVARIAN SYNDROME): ICD-10-CM

## 2025-02-06 LAB
ALBUMIN SERPL-MCNC: 4.4 G/DL (ref 3.5–5.2)
ALP SERPL-CCNC: 64 U/L (ref 35–104)
ALT SERPL-CCNC: 10 U/L (ref 5–33)
ANION GAP SERPL CALCULATED.3IONS-SCNC: 10 MMOL/L (ref 8–16)
AST SERPL-CCNC: 16 U/L (ref 5–32)
BASOPHILS # BLD: 0.1 K/UL (ref 0–0.2)
BASOPHILS NFR BLD: 1.9 % (ref 0–1)
BILIRUB SERPL-MCNC: 0.2 MG/DL (ref 0.2–1.2)
BUN SERPL-MCNC: 13 MG/DL (ref 6–20)
CALCIUM SERPL-MCNC: 9.1 MG/DL (ref 8.6–10)
CHLORIDE SERPL-SCNC: 100 MMOL/L (ref 98–107)
CHOLEST SERPL-MCNC: 252 MG/DL (ref 0–199)
CO2 SERPL-SCNC: 29 MMOL/L (ref 22–29)
CREAT SERPL-MCNC: 0.7 MG/DL (ref 0.5–0.9)
EOSINOPHIL # BLD: 0.1 K/UL (ref 0–0.6)
EOSINOPHIL NFR BLD: 1.1 % (ref 0–5)
ERYTHROCYTE [DISTWIDTH] IN BLOOD BY AUTOMATED COUNT: 17.1 % (ref 11.5–14.5)
ESTRADIOL SERPL-SCNC: 182.2 PG/ML
FSH SERPL-SCNC: 6.8 MIU/ML
GLUCOSE SERPL-MCNC: 87 MG/DL (ref 70–99)
HBA1C MFR BLD: 5.2 % (ref 4–5.6)
HCT VFR BLD AUTO: 36.8 % (ref 37–47)
HDLC SERPL-MCNC: 71 MG/DL (ref 40–60)
HGB BLD-MCNC: 10.7 G/DL (ref 12–16)
IMM GRANULOCYTES # BLD: 0 K/UL
LDLC SERPL CALC-MCNC: 166 MG/DL
LH SERPL-ACNC: 13.9 MIU/ML
LYMPHOCYTES # BLD: 1.7 K/UL (ref 1.1–4.5)
LYMPHOCYTES NFR BLD: 35.8 % (ref 20–40)
MCH RBC QN AUTO: 20.7 PG (ref 27–31)
MCHC RBC AUTO-ENTMCNC: 29.1 G/DL (ref 33–37)
MCV RBC AUTO: 71.3 FL (ref 81–99)
MONOCYTES # BLD: 0.4 K/UL (ref 0–0.9)
MONOCYTES NFR BLD: 7.6 % (ref 0–10)
NEUTROPHILS # BLD: 2.5 K/UL (ref 1.5–7.5)
NEUTS SEG NFR BLD: 53.4 % (ref 50–65)
PLATELET # BLD AUTO: 370 K/UL (ref 130–400)
PLATELET SLIDE REVIEW: ADEQUATE
PMV BLD AUTO: 9.1 FL (ref 9.4–12.3)
POTASSIUM SERPL-SCNC: 4.4 MMOL/L (ref 3.5–5.1)
PROGEST SERPL-MCNC: 0.27 NG/ML
PROT SERPL-MCNC: 7.7 G/DL (ref 6.4–8.3)
RBC # BLD AUTO: 5.16 M/UL (ref 4.2–5.4)
SODIUM SERPL-SCNC: 139 MMOL/L (ref 136–145)
TRIGL SERPL-MCNC: 73 MG/DL (ref 0–149)
TSH SERPL DL<=0.005 MIU/L-ACNC: 1 UIU/ML (ref 0.27–4.2)
WBC # BLD AUTO: 4.7 K/UL (ref 4.8–10.8)

## 2025-02-06 PROCEDURE — 99396 PREV VISIT EST AGE 40-64: CPT | Performed by: FAMILY MEDICINE

## 2025-02-06 RX ORDER — DEXMETHYLPHENIDATE HYDROCHLORIDE 10 MG/1
10 TABLET ORAL 2 TIMES DAILY
COMMUNITY

## 2025-02-06 SDOH — ECONOMIC STABILITY: FOOD INSECURITY: WITHIN THE PAST 12 MONTHS, YOU WORRIED THAT YOUR FOOD WOULD RUN OUT BEFORE YOU GOT MONEY TO BUY MORE.: NEVER TRUE

## 2025-02-06 SDOH — ECONOMIC STABILITY: FOOD INSECURITY: WITHIN THE PAST 12 MONTHS, THE FOOD YOU BOUGHT JUST DIDN'T LAST AND YOU DIDN'T HAVE MONEY TO GET MORE.: NEVER TRUE

## 2025-02-06 ASSESSMENT — ENCOUNTER SYMPTOMS
ABDOMINAL PAIN: 0
WHEEZING: 0
NAUSEA: 0
DIARRHEA: 0
BACK PAIN: 0
COUGH: 0
EYE DISCHARGE: 0
COLOR CHANGE: 0
VOMITING: 0

## 2025-02-06 ASSESSMENT — PATIENT HEALTH QUESTIONNAIRE - PHQ9
SUM OF ALL RESPONSES TO PHQ QUESTIONS 1-9: 1
10. IF YOU CHECKED OFF ANY PROBLEMS, HOW DIFFICULT HAVE THESE PROBLEMS MADE IT FOR YOU TO DO YOUR WORK, TAKE CARE OF THINGS AT HOME, OR GET ALONG WITH OTHER PEOPLE: NOT DIFFICULT AT ALL
SUM OF ALL RESPONSES TO PHQ QUESTIONS 1-9: 1
4. FEELING TIRED OR HAVING LITTLE ENERGY: SEVERAL DAYS
SUM OF ALL RESPONSES TO PHQ QUESTIONS 1-9: 1
7. TROUBLE CONCENTRATING ON THINGS, SUCH AS READING THE NEWSPAPER OR WATCHING TELEVISION: NOT AT ALL
8. MOVING OR SPEAKING SO SLOWLY THAT OTHER PEOPLE COULD HAVE NOTICED. OR THE OPPOSITE, BEING SO FIGETY OR RESTLESS THAT YOU HAVE BEEN MOVING AROUND A LOT MORE THAN USUAL: NOT AT ALL
5. POOR APPETITE OR OVEREATING: NOT AT ALL
9. THOUGHTS THAT YOU WOULD BE BETTER OFF DEAD, OR OF HURTING YOURSELF: NOT AT ALL
SUM OF ALL RESPONSES TO PHQ QUESTIONS 1-9: 1
SUM OF ALL RESPONSES TO PHQ9 QUESTIONS 1 & 2: 0
2. FEELING DOWN, DEPRESSED OR HOPELESS: NOT AT ALL
3. TROUBLE FALLING OR STAYING ASLEEP: NOT AT ALL
6. FEELING BAD ABOUT YOURSELF - OR THAT YOU ARE A FAILURE OR HAVE LET YOURSELF OR YOUR FAMILY DOWN: NOT AT ALL
1. LITTLE INTEREST OR PLEASURE IN DOING THINGS: NOT AT ALL

## 2025-02-06 NOTE — PROGRESS NOTES
Well Adult Note  Name: Becky Álvarez Today’s Date: 2025   MRN: [id number] Sex: Female   Age: 39 y.o. Ethnicity: Non- / Non    : 1985 Race: White (non-)      Becky Álvarez is here for a well adult exam.          Assessment & Plan  1. Health maintenance.  A mammogram has been ordered at [facility]. Laboratory tests, including hormone level assessment, will be conducted today. She has been advised to schedule an appointment with Dr. Pippa Pa for a Pap smear. A referral to a dermatologist has been made for a comprehensive skin examination.    2. Lymphedema.  A referral to a lymphedema clinic in [place] has been initiated. The clinic will provide compression therapy and massage as part of the treatment plan. If there are insurance issues, an alternative clinic will be considered.    3. ADHD.  She reports that her current ADHD medication may not be providing sufficient energy, potentially due to stress. She is advised to continue her current medication regimen and monitor her symptoms.  Encounter for well adult exam without abnormal findings  Sweating abnormality  -     Follicle Stimulating Hormone  -     Luteinizing Hormone  -     Estradiol  -     Progesterone  -     TSH  -     Hemoglobin A1C  -     CBC with Auto Differential  -     Comprehensive Metabolic Panel  -     Lipid Panel  Hot flashes  -     Follicle Stimulating Hormone  -     Luteinizing Hormone  -     Estradiol  -     Progesterone  -     TSH  -     Hemoglobin A1C  -     CBC with Auto Differential  -     Comprehensive Metabolic Panel  -     Lipid Panel  PCOS (polycystic ovarian syndrome)  -     TSH  -     Hemoglobin A1C  -     CBC with Auto Differential  -     Comprehensive Metabolic Panel  -     Lipid Panel  Wellness examination  -     TSH  -     Hemoglobin A1C  -     CBC with Auto Differential  -     Comprehensive Metabolic Panel  -     Lipid Panel  Screening mammogram for breast cancer  -     DAYSI DIGITAL SCREEN W OR WO CAD

## 2025-02-07 LAB
25(OH)D3 SERPL-MCNC: 38.3 NG/ML
VIT B12 SERPL-MCNC: 642 PG/ML (ref 232–1245)

## 2025-02-26 ENCOUNTER — TELEPHONE (OUTPATIENT)
Dept: PRIMARY CARE CLINIC | Age: 40
End: 2025-02-26

## 2025-02-26 DIAGNOSIS — I89.0 LYMPHEDEMA: Primary | ICD-10-CM

## 2025-02-26 NOTE — TELEPHONE ENCOUNTER
Pt states the Lyphedema clinic in [place] is not covered by Insurance. Pt would like to go to [facility].

## 2025-04-01 ENCOUNTER — HOSPITAL ENCOUNTER (OUTPATIENT)
Dept: WOMENS IMAGING | Age: 40
Discharge: HOME | End: 2025-04-01
Payer: MEDICAID

## 2025-04-01 VITALS — BODY MASS INDEX: 28.51 KG/M2 | WEIGHT: 167 LBS | HEIGHT: 64 IN

## 2025-04-01 DIAGNOSIS — Z12.31 SCREENING MAMMOGRAM FOR BREAST CANCER: ICD-10-CM

## 2025-04-01 PROCEDURE — 77063 BREAST TOMOSYNTHESIS BI: CPT

## 2025-04-02 ENCOUNTER — RESULTS FOLLOW-UP (OUTPATIENT)
Dept: PRIMARY CARE CLINIC | Age: 40
End: 2025-04-02

## 2025-04-03 ENCOUNTER — HOSPITAL ENCOUNTER (OUTPATIENT)
Dept: OCCUPATIONAL THERAPY | Age: 40
Setting detail: THERAPIES SERIES
Discharge: HOME | End: 2025-04-03

## 2025-04-03 NOTE — PROGRESS NOTES
Occupational Therapy: Initial Evaluation   Patient: Becky Álvarez (39 y.o. female)   Examination Date: 2025  Plan of Care Certification Period: 4/3/2025 to 25      :  1985  MRN: [id number]  CSN: [id number]   Insurance: Payor: [employer] / [facility]: [employer] / Product Type: *No Product type* /   Insurance ID: [id number] - (Medicaid Managed) Secondary Insurance (if applicable):    Insurance Information: [facility] Medicaid   Referring Physician: Katie Perez MD Marissa Stewart-Jaynes, MD   PCP: Katie Perez MD Visits to Date/Visits Approved:   /      No Show/Cancelled Appts:    /       Medical Diagnosis: Lymphedema, not elsewhere classified [I89.0] I89.0  Treatment Diagnosis: I89.0       PERTINENT MEDICAL HISTORY   Patient assessed for rehabilitation services?: Yes  Self reported health status:: Excellent    Medical History: Chart Reviewed: Yes   Past Medical History:   Diagnosis Date    Anxiety     Back pain     Depression     Hepatitis C     History of anemia     History of gallstones     History of kidney stones     Knee pain     left    Postpartum depression     Sleep apnea     has cpap can not use     Surgical History:   Past Surgical History:   Procedure Laterality Date     SECTION      COLONOSCOPY N/A 01/10/2024    Dr Arana-Up to TI, suboptimal prep, internal hemorrhoids-Grade 1, 5 yr recall    GASTRIC RESTRICTION SURGERY  2019    TONSILLECTOMY      UPPER GASTROINTESTINAL ENDOSCOPY N/A 01/10/2024    Dr Arana-History of sleeve gastrectomy/bariatric surgery w/the narrowed gastric lumen corresponding to the body of the stomach,, gastritis is likely one of the main sources of her chronic GI blood loss and iron deficiency anemia-No sprue or h pylori    VASCULAR SURGERY Left 2020    VI- L GSV RFA    VASCULAR SURGERY  2021    VI.Venogram of IVC and bilateral common and external iliac veins.Placement of left common iliac stent

## 2025-04-15 ENCOUNTER — HOSPITAL ENCOUNTER (OUTPATIENT)
Dept: OCCUPATIONAL THERAPY | Age: 40
Setting detail: THERAPIES SERIES
End: 2025-04-15
Payer: MEDICAID

## 2025-04-17 ENCOUNTER — APPOINTMENT (OUTPATIENT)
Dept: OCCUPATIONAL THERAPY | Age: 40
End: 2025-04-17
Payer: MEDICAID

## 2025-04-22 ENCOUNTER — HOSPITAL ENCOUNTER (OUTPATIENT)
Dept: OCCUPATIONAL THERAPY | Age: 40
Setting detail: THERAPIES SERIES
Discharge: HOME | End: 2025-04-22
Payer: MEDICAID

## 2025-04-22 PROCEDURE — 97140 MANUAL THERAPY 1/> REGIONS: CPT

## 2025-04-22 NOTE — PROGRESS NOTES
Occupational Therapy  Daily Treatment Note  Date: 2025  Patient Name: Becky Álvarez  :  1985  MRN: [id number]       Subjective   General  Patient assessed for rehabilitation services?: Yes  Diagnosis: I89.0  OT Visit Information  Onset Date: 25  OT Insurance Information: [employer]  Total # of Visits to Date: 1  Certification Period Expiration Date: 25  Progress Note Due Date: 25      Treatment Activities:     OT Treatment Completed:  Lower Decongestion Therapy:  Treatment Reasoning    LLE Complete Decongestion Therapy  Scale: Stage 1  Lymph Drainage Pattern: Abdominal lymph nodes, Inguinal lymph nodes, Left inguino-axillary anastamosis, Anterior inguino-inguinal anastamosis, Posterior inguino-inguinal anastamosis  Compression Technique: Compression garment  Exercise Reps: 10      Patient Response: Tolerated well.  Limitations addressed: Joint motion, Tissue Extensibility, Edema  Therapist provided: Verbal cuing, Manual cuing  Functional ability(s) targeted: Ambulating community distances, Performing self care activities, Tolerance to age appropriate activities                              Assessment   Performance deficits / Impairments:  (LLE lymphedema)  Assessment: Patient tolerated first MLD session this date with no adverse effects.  Pt arrived with compression leggings on.  Pt is concerned that leggings are too loose.  Discussed this with therapist, and she will assess the fitting. Patient continues to benefit from skilled OT services.  Treatment Diagnosis: I89.0  Prognosis: Good  Exam: LLIS, BLE circumference measurements  REQUIRES OT FOLLOW-UP: Yes  Timed Code Treatment Minutes: 63 Minutes       Plan   Occupational Therapy Plan  Plan Weeks: x4-8 weeks  Specific Instructions for Next Treatment: MLD in clinic, teach self MLD, ensure compression is fitting  Current Treatment Recommendations: Manual Therapy:  MLD, Patient/Caregiver education & training, Safety education &

## 2025-04-24 ENCOUNTER — HOSPITAL ENCOUNTER (OUTPATIENT)
Dept: OCCUPATIONAL THERAPY | Age: 40
Setting detail: THERAPIES SERIES
Discharge: HOME | End: 2025-04-24
Payer: MEDICAID

## 2025-04-24 PROCEDURE — 97140 MANUAL THERAPY 1/> REGIONS: CPT

## 2025-04-24 NOTE — PROGRESS NOTES
Occupational Therapy  Daily Treatment Note  Date: 2025  Patient Name: Becky Álvarez  :  1985  MRN: [id number]       Subjective   General  Patient assessed for rehabilitation services?: Yes  Diagnosis: I89.0  OT Visit Information  Onset Date: 25  OT Insurance Information: [employer]  Total # of Visits to Date: 2  Certification Period Expiration Date: 25  Progress Note Due Date: 25      Treatment Activities:     OT Treatment Completed:  Lower Decongestion Therapy:  Treatment Reasoning    LLE Complete Decongestion Therapy  Scale: Stage 1  Lymph Drainage Pattern: Abdominal lymph nodes, Inguinal lymph nodes, Anterior inguino-inguinal anastamosis, Posterior inguino-inguinal anastamosis, Left inguino-axillary anastamosis  Compression Technique: Compression garment  Exercise Reps: 10      Patient Response: Tolerated well.  Limitations addressed: Joint motion, Tissue Extensibility, Edema  Therapist provided: Verbal cuing, Manual cuing  Functional ability(s) targeted: Ambulating community distances, Performing self care activities, Tolerance to age appropriate activities                              Assessment   Performance deficits / Impairments:  (LLE lymphedema)  Assessment: Patient tolerated first MLD session this date with no adverse effects.  Pt arrived with compression leggings on.  Patient continues to benefit from skilled OT services.  Treatment Diagnosis: I89.0  Prognosis: Good  Exam: LLIS, BLE circumference measurements  REQUIRES OT FOLLOW-UP: Yes  Timed Code Treatment Minutes: 58 Minutes       Plan   Occupational Therapy Plan  Plan Weeks: x4-8 weeks  Specific Instructions for Next Treatment: MLD in clinic, teach self MLD, ensure compression is fitting  Current Treatment Recommendations: Manual Therapy:  MLD, Patient/Caregiver education & training, Safety education & training, Equipment evaluation, education, & procurement, Home management training     Goals  Short Term

## 2025-04-29 ENCOUNTER — HOSPITAL ENCOUNTER (OUTPATIENT)
Dept: OCCUPATIONAL THERAPY | Age: 40
Setting detail: THERAPIES SERIES
Discharge: HOME | End: 2025-04-29
Payer: MEDICAID

## 2025-04-29 PROCEDURE — 97140 MANUAL THERAPY 1/> REGIONS: CPT

## 2025-04-29 NOTE — PROGRESS NOTES
Daily Treatment Note  Date: 2025  Patient Name: Becky Álvarez  :  1985  MRN: [id number]       Subjective   General  Chart Reviewed: Yes  Patient assessed for rehabilitation services?: Yes  Self reported health status:: Excellent  History obtained from:: Patient, Chart Review  Family/Caregiver Present: No  Diagnosis: I89.0  Referring Provider (secondary): Katie Perez MD  OT Visit Information  Onset Date: 25  OT Insurance Information: [employer]  Total # of Visits Approved: 16  Total # of Visits to Date: 2  Certification Period Expiration Date: 25  Progress Note Due Date: 25      Treatment Activities:    OT Treatment Completed:  Lower Decongestion Therapy:  Treatment Reasoning    LLE Complete Decongestion Therapy  Scale: Stage 1  Lymph Drainage Pattern: Abdominal lymph nodes, Inguinal lymph nodes, Anterior inguino-inguinal anastamosis, Posterior inguino-inguinal anastamosis, Lower extremity, Left inguino-axillary anastamosis  Compression Technique: Compression garment  Exercise Reps: 10    Patient Response: Tolerated well.  Limitations addressed: Joint motion, Tissue Extensibility, Edema  Therapist provided: Verbal cuing, Manual cuing  Functional ability(s) targeted: Ambulating community distances, Performing self care activities, Tolerance to age appropriate activities                      Assessment   Performance deficits / Impairments:  (LLE lymphedema)  Assessment: Patient tolerated first MLD session this date with no adverse effects.  Pt arrived with compression leggings on. Patient measured for knee high socks as she reports she would like something a little cooler to wear for the summer. Upgraded compression level for socks to 20-30 mmHg. Patient continues to benefit from skilled OT services.  Treatment Diagnosis: I89.0  Prognosis: Good  REQUIRES OT FOLLOW-UP: Yes  Timed Code Treatment Minutes: 54 Minutes         Plan   Occupational Therapy Plan  Plan Weeks: x4-8

## 2025-05-01 ENCOUNTER — HOSPITAL ENCOUNTER (OUTPATIENT)
Dept: OCCUPATIONAL THERAPY | Age: 40
Setting detail: THERAPIES SERIES
Discharge: HOME | End: 2025-05-01
Payer: MEDICAID

## 2025-05-01 PROCEDURE — 97530 THERAPEUTIC ACTIVITIES: CPT

## 2025-05-01 PROCEDURE — 97140 MANUAL THERAPY 1/> REGIONS: CPT

## 2025-05-01 NOTE — PROGRESS NOTES
Daily Treatment Note/Reassessment  Date: 2025  Patient Name: Becky Álvarez  :  1985  MRN: [id number]       Subjective   General  Chart Reviewed: Yes  Patient assessed for rehabilitation services?: Yes  Self reported health status:: Excellent  History obtained from:: Patient, Chart Review  Family/Caregiver Present: No  Diagnosis: I89.0  Referring Provider (secondary): Katie Perez MD  OT Visit Information  Onset Date: 25  OT Insurance Information: [employer]  Total # of Visits Approved: 16  Total # of Visits to Date: 3  Certification Period Expiration Date: 25  Progress Note Due Date: 25      Treatment Activities:    OT Treatment Completed:  Lower Decongestion Therapy:  Treatment Reasoning    LLE Complete Decongestion Therapy  Scale: Stage 1  Lymph Drainage Pattern: Abdominal lymph nodes, Inguinal lymph nodes, Anterior inguino-inguinal anastamosis, Posterior inguino-inguinal anastamosis, Lower extremity, Left inguino-axillary anastamosis  Compression Technique: Compression garment  Exercise Reps: 10    Patient Response: Tolerated well.  Limitations addressed: Joint motion, Tissue Extensibility, Edema  Therapist provided: Manual cuing  Functional ability(s) targeted: Ambulating community distances, Performing self care activities, Tolerance to age appropriate activities                     Measurements: Circumferential Limb Measurements   Limb Measurements Assessed: LE  Left Measurements Right Measurements   Left LE Circumferential Measurements (cm)   Patient Position: Standing  Reference for Starting Point: 10 cm from floor to lateral ankle (barefoot) then every 5 cm after moving towards groin  0 (Starting Measurement): 23.8  (+ 4 cm): 30  (+8 cm): 32.8  (+12 cm): 38.1  (+16 cm): 40  (+20 cm): 41  (+24 cm): 37.8  (+28 cm): 37  (+32 cm): 42.7  (+36 cm): 48.2  (+40 cm): 53.4  (+44 cm): 57.5  (+48 cm): 61.1  (+52 cm): 61.1  Total Girth - Left LE: 604.5 Right LE

## 2025-05-05 ENCOUNTER — APPOINTMENT (OUTPATIENT)
Dept: OCCUPATIONAL THERAPY | Age: 40
End: 2025-05-05
Payer: MEDICAID

## 2025-05-09 ENCOUNTER — APPOINTMENT (OUTPATIENT)
Dept: OCCUPATIONAL THERAPY | Age: 40
End: 2025-05-09
Payer: MEDICAID

## 2025-05-12 ENCOUNTER — HOSPITAL ENCOUNTER (OUTPATIENT)
Dept: OCCUPATIONAL THERAPY | Age: 40
Setting detail: THERAPIES SERIES
Discharge: HOME | End: 2025-05-12
Payer: MEDICAID

## 2025-05-12 PROCEDURE — 97140 MANUAL THERAPY 1/> REGIONS: CPT

## 2025-05-12 NOTE — PROGRESS NOTES
Daily Treatment Note  Date: 2025  Patient Name: Bceky Álvarez  :  1985  MRN: [id number]       Subjective   General  Chart Reviewed: Yes  Patient assessed for rehabilitation services?: Yes  Self reported health status:: Excellent  History obtained from:: Patient, Chart Review  Family/Caregiver Present: No  Diagnosis: I89.0  Referring Provider (secondary): Katie Perez MD  OT Visit Information  Onset Date: 25  OT Insurance Information: [employer]  Total # of Visits Approved: 16  Total # of Visits to Date: 5  Certification Period Expiration Date: 25  Progress Note Due Date: 25      Treatment Activities:    OT Treatment Completed:  Lower Decongestion Therapy:  Treatment Reasoning    LLE Complete Decongestion Therapy  Scale: Stage 1  Lymph Drainage Pattern: Abdominal lymph nodes, Inguinal lymph nodes, Anterior inguino-inguinal anastamosis, Posterior inguino-inguinal anastamosis, Lower extremity, Left inguino-axillary anastamosis  Compression Technique: Compression garment  Exercise Reps: 10    Patient Response: Tolerated well.  Limitations addressed: Joint motion, Tissue Extensibility, Edema  Therapist provided: Manual cuing  Functional ability(s) targeted: Ambulating community distances, Tolerance to age appropriate activities, Performing self care activities                      Assessment   Performance deficits / Impairments:  (LLE lymphedema)  Assessment: Patient participated in OT session this date for continued LLE decongestion. Patient did purchase compression socks for LLE calf and therapist noticed a difference in calf circumference this date. Patient continues to benefit from skilled OT services.  Treatment Diagnosis: I89.0  Prognosis: Good  Exam: LLIS, BLE circumference measurements  REQUIRES OT FOLLOW-UP: Yes  Timed Code Treatment Minutes: 54 Minutes         Plan   Occupational Therapy Plan  Plan Weeks: x4-8 weeks  Specific Instructions for Next Treatment: MLD in

## 2025-05-15 ENCOUNTER — HOSPITAL ENCOUNTER (OUTPATIENT)
Dept: OCCUPATIONAL THERAPY | Age: 40
Setting detail: THERAPIES SERIES
Discharge: HOME | End: 2025-05-15
Payer: MEDICAID

## 2025-05-15 PROCEDURE — 97140 MANUAL THERAPY 1/> REGIONS: CPT

## 2025-05-15 NOTE — PROGRESS NOTES
Daily Treatment Note  Date: 5/15/2025  Patient Name: Becky Álvarez  :  1985  MRN: [id number]       Subjective   General  Chart Reviewed: Yes  Patient assessed for rehabilitation services?: Yes  Self reported health status:: Excellent  History obtained from:: Patient, Chart Review  Family/Caregiver Present: No  Diagnosis: I89.0  Referring Provider (secondary): Katie Perez MD  OT Visit Information  Onset Date: 25  OT Insurance Information: [employer] Medicaid  Total # of Visits Approved: 16  Total # of Visits to Date: 6  Certification Period Expiration Date: 25  Progress Note Due Date: 25      Treatment Activities:    OT Treatment Completed:  Lower Decongestion Therapy:  Treatment Reasoning    LLE Complete Decongestion Therapy  Scale: Stage 2  Lymph Drainage Pattern: Abdominal lymph nodes, Inguinal lymph nodes, Anterior inguino-inguinal anastamosis, Posterior inguino-inguinal anastamosis, Lower extremity, Left inguino-axillary anastamosis  Compression Technique: Compression garment  Exercise Reps: 10    Patient Response: Tolerated well.  Limitations addressed: Joint motion, Tissue Extensibility, Edema  Therapist provided: Manual cuing  Functional ability(s) targeted: Ambulating community distances, Tolerance to age appropriate activities, Performing self care activities                      Assessment   Performance deficits / Impairments:  (LLE lymphedema)  Assessment: Patient tolerated first MLD session this date with no adverse effects.  Pt arrived with compression leggings on.  Patient continues to benefit from skilled OT services.  Treatment Diagnosis: I89.0  Prognosis: Good  Exam: LLIS, BLE circumference measurements  REQUIRES OT FOLLOW-UP: Yes  Timed Code Treatment Minutes: 57 Minutes         Plan   Occupational Therapy Plan  Plan Weeks: x4-8 weeks  Specific Instructions for Next Treatment: MLD in clinic, teach self MLD, ensure compression is fitting  Current Treatment

## 2025-05-20 ENCOUNTER — HOSPITAL ENCOUNTER (OUTPATIENT)
Dept: OCCUPATIONAL THERAPY | Age: 40
Setting detail: THERAPIES SERIES
Discharge: HOME | End: 2025-05-20
Payer: MEDICAID

## 2025-05-20 PROCEDURE — 97140 MANUAL THERAPY 1/> REGIONS: CPT

## 2025-05-27 ENCOUNTER — HOSPITAL ENCOUNTER (OUTPATIENT)
Dept: OCCUPATIONAL THERAPY | Age: 40
Setting detail: THERAPIES SERIES
Discharge: HOME | End: 2025-05-27
Payer: MEDICAID

## 2025-05-27 ENCOUNTER — OFFICE VISIT (OUTPATIENT)
Dept: PRIMARY CARE CLINIC | Age: 40
End: 2025-05-27
Payer: MEDICAID

## 2025-05-27 VITALS
HEIGHT: 64 IN | HEART RATE: 74 BPM | RESPIRATION RATE: 16 BRPM | BODY MASS INDEX: 30.05 KG/M2 | WEIGHT: 176 LBS | TEMPERATURE: 97.3 F | DIASTOLIC BLOOD PRESSURE: 80 MMHG | OXYGEN SATURATION: 99 % | SYSTOLIC BLOOD PRESSURE: 118 MMHG
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
## 2025-05-27 DIAGNOSIS — Z01.818 TUBAL LIGATION EVALUATION: ICD-10-CM

## 2025-05-27 DIAGNOSIS — M54.9 ACUTE BILATERAL BACK PAIN, UNSPECIFIED BACK LOCATION: Primary | ICD-10-CM

## 2025-05-27 PROCEDURE — G8427 DOCREV CUR MEDS BY ELIG CLIN: HCPCS | Performed by: FAMILY MEDICINE

## 2025-05-27 PROCEDURE — G8417 CALC BMI ABV UP PARAM F/U: HCPCS | Performed by: FAMILY MEDICINE

## 2025-05-27 PROCEDURE — 97140 MANUAL THERAPY 1/> REGIONS: CPT

## 2025-05-27 PROCEDURE — 99214 OFFICE O/P EST MOD 30 MIN: CPT | Performed by: FAMILY MEDICINE

## 2025-05-27 PROCEDURE — 1036F TOBACCO NON-USER: CPT | Performed by: FAMILY MEDICINE

## 2025-05-27 RX ORDER — MUPIROCIN 20 MG/G
OINTMENT TOPICAL
COMMUNITY
Start: 2025-02-20

## 2025-05-27 RX ORDER — DEXMETHYLPHENIDATE HYDROCHLORIDE 10 MG/1
10 CAPSULE, EXTENDED RELEASE ORAL DAILY
COMMUNITY
Start: 2025-05-13

## 2025-05-27 RX ORDER — TRAZODONE HYDROCHLORIDE 100 MG/1
TABLET ORAL
COMMUNITY
Start: 2025-04-14

## 2025-05-27 ASSESSMENT — ENCOUNTER SYMPTOMS
WHEEZING: 0
VOMITING: 0
BACK PAIN: 1
ABDOMINAL PAIN: 0
COLOR CHANGE: 0
DIARRHEA: 0
COUGH: 0
EYE DISCHARGE: 0
NAUSEA: 0
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
## 2025-05-27 NOTE — PROGRESS NOTES
Frame for Short Term Goals: x4 weeks  Short Term Goal 1: Patient will improve LLIS score to <20 indicating improvement with LLE lymphedema in daily activities.  Short Term Goal 2: Patient will report 2/4 on body image for LLE after compression use daily and lymphedema treatment via CDT protocol.  Short Term Goal 3: Patient will obtain compression for BLE for compliance with CDT protocol.  Long Term Goals  Time Frame for Long Term Goals : x8 weeks  Long Term Goal 1: Patient will improve LLIS score to <10 indicating improvement with LLE lymphedema in daily activities.  Long Term Goal 2: Patient will be compliant with BLE compression daily by discharge.  Long Term Goal 3: Patient's LLE circumference measurements will decrease by >20-30 cm with compliance of CDT protocol.  Long Term Goal 4: Patient's LLE circumference measurements will decrease by >30 cm with compliance of CDT protocol.  Patient Goals   Patient goals : Patient wants her LLE swelling to decrease to improve confidence and comfort wearing clothes of choice.    Therapy Time   Individual Concurrent Group Co-treatment   Time In 1103         Time Out 1200         Minutes 57         Timed Code Treatment Minutes: 57 Minutes    Electronically signed by MIKEY Grimaldo on 5/27/2025 at 4:20 PM

## 2025-06-02 ENCOUNTER — PATIENT MESSAGE (OUTPATIENT)
Dept: PRIMARY CARE CLINIC | Age: 40
End: 2025-06-02

## 2025-06-02 DIAGNOSIS — M54.9 ACUTE BILATERAL BACK PAIN, UNSPECIFIED BACK LOCATION: ICD-10-CM
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
## 2025-06-25 ENCOUNTER — PATIENT MESSAGE (OUTPATIENT)
Dept: PRIMARY CARE CLINIC | Age: 40
End: 2025-06-25

## 2025-06-25 DIAGNOSIS — G47.33 OBSTRUCTIVE SLEEP APNEA: Primary | ICD-10-CM

## 2025-06-25 DIAGNOSIS — E66.811 OBESITY (BMI 30.0-34.9): ICD-10-CM

## 2025-06-25 DIAGNOSIS — E28.2 PCOS (POLYCYSTIC OVARIAN SYNDROME): ICD-10-CM

## 2025-08-19 ENCOUNTER — PATIENT MESSAGE (OUTPATIENT)
Dept: PRIMARY CARE CLINIC | Age: 40
End: 2025-08-19

## 2025-08-21 ENCOUNTER — OFFICE VISIT (OUTPATIENT)
Dept: PRIMARY CARE CLINIC | Age: 40
End: 2025-08-21
Payer: MEDICAID

## 2025-08-21 ENCOUNTER — ANCILLARY PROCEDURE (OUTPATIENT)
Dept: PRIMARY CARE CLINIC | Age: 40
End: 2025-08-21
Payer: MEDICAID

## 2025-08-21 VITALS
HEIGHT: 64 IN | SYSTOLIC BLOOD PRESSURE: 130 MMHG | DIASTOLIC BLOOD PRESSURE: 78 MMHG | HEART RATE: 87 BPM | OXYGEN SATURATION: 100 % | BODY MASS INDEX: 28.51 KG/M2 | TEMPERATURE: 97.8 F | WEIGHT: 167 LBS | RESPIRATION RATE: 16 BRPM

## 2025-08-21 DIAGNOSIS — L98.9 LESION OF SKIN OF NOSE: ICD-10-CM

## 2025-08-21 DIAGNOSIS — M54.6 BILATERAL THORACIC BACK PAIN, UNSPECIFIED CHRONICITY: Primary | ICD-10-CM

## 2025-08-21 PROCEDURE — G8417 CALC BMI ABV UP PARAM F/U: HCPCS | Performed by: FAMILY MEDICINE

## 2025-08-21 PROCEDURE — 72072 X-RAY EXAM THORAC SPINE 3VWS: CPT | Performed by: FAMILY MEDICINE

## 2025-08-21 PROCEDURE — 1036F TOBACCO NON-USER: CPT | Performed by: FAMILY MEDICINE

## 2025-08-21 PROCEDURE — 99212 OFFICE O/P EST SF 10 MIN: CPT | Performed by: FAMILY MEDICINE

## 2025-08-21 PROCEDURE — G8427 DOCREV CUR MEDS BY ELIG CLIN: HCPCS | Performed by: FAMILY MEDICINE

## 2025-08-21 RX ORDER — TRAZODONE HYDROCHLORIDE 50 MG/1
TABLET ORAL
COMMUNITY
Start: 2025-06-17

## 2025-08-21 ASSESSMENT — ENCOUNTER SYMPTOMS
DIARRHEA: 0
WHEEZING: 0
NAUSEA: 0
COLOR CHANGE: 0
EYE DISCHARGE: 0
COUGH: 0
BACK PAIN: 1
ABDOMINAL PAIN: 0
VOMITING: 0

## 2025-08-26 DIAGNOSIS — M54.6 BILATERAL THORACIC BACK PAIN, UNSPECIFIED CHRONICITY: Primary | ICD-10-CM

## 2025-09-05 ENCOUNTER — HOSPITAL ENCOUNTER (OUTPATIENT)
Dept: MRI IMAGING | Age: 40
Discharge: HOME | End: 2025-09-05
Payer: MEDICAID

## 2025-09-05 DIAGNOSIS — M54.6 BILATERAL THORACIC BACK PAIN, UNSPECIFIED CHRONICITY: ICD-10-CM

## 2025-09-05 PROCEDURE — 72146 MRI CHEST SPINE W/O DYE: CPT

## (undated) DEVICE — BITE BLOCK ENDOSCP AD 60 FR W/ ADJ STRP PLAS GRN BLOX

## (undated) DEVICE — ADAPTER CLEANING PORPOISE CLEANING

## (undated) DEVICE — BRUSH ENDOSCP 2 END CHN HEDGEHOG

## (undated) DEVICE — CANNULA NSL AD L7FT DIV O2 CO2 W/ M LUERLOCK TRMPT CONN

## (undated) DEVICE — ENDO KIT,LOURDES HOSPITAL: Brand: MEDLINE INDUSTRIES, INC.

## (undated) DEVICE — FORCEPS BX 240CM 2.4MM L NDL RAD JAW 4 M00513334

## (undated) DEVICE — SINGLE PORT MANIFOLD: Brand: NEPTUNE 2

## (undated) DEVICE — CLEANING SPONGE: Brand: KOALA™